# Patient Record
Sex: FEMALE | Race: WHITE | Employment: FULL TIME | URBAN - METROPOLITAN AREA
[De-identification: names, ages, dates, MRNs, and addresses within clinical notes are randomized per-mention and may not be internally consistent; named-entity substitution may affect disease eponyms.]

---

## 2017-07-18 ENCOUNTER — HOSPITAL ENCOUNTER (OUTPATIENT)
Dept: PHYSICAL THERAPY | Age: 47
Discharge: HOME | End: 2017-07-18
Payer: OTHER GOVERNMENT

## 2017-07-18 PROCEDURE — 97110 THERAPEUTIC EXERCISES: CPT

## 2017-07-18 PROCEDURE — 97140 MANUAL THERAPY 1/> REGIONS: CPT

## 2017-07-18 PROCEDURE — 97162 PT EVAL MOD COMPLEX 30 MIN: CPT

## 2017-07-18 PROCEDURE — 97112 NEUROMUSCULAR REEDUCATION: CPT

## 2017-07-18 NOTE — PROGRESS NOTES
[facility] in [address] [place], [facility] [address]  Phone: [phone] Fax: [phone] of Care/ Statement of Necessity for Physical Therapy Services    Patient name: Duy German Start of Care: 2017   Referral source: Jalen Ghotra,* : 1970    Medical Diagnosis: Pain in right hip [M25.551] Onset Date: early 2017   Treatment Diagnosis: pelvis obliquity/instability, LEs weakness, tight ITBs, muscular dysfunction   Prior Hospitalization: see medical history Provider#: [id number]   Medications: Verified on Patient summary List    Comorbidities: CA   Prior Level of Function: no deficits working at [employer] as       The [address] [place] [address] and following information is based on the information from the initial evaluation. Assessment/ key information:                Pt displayed right innominate upslip (corrected today resulting in abolishment of pain); strength of LEs grossly 5/5 all muscle groups except bilateral hip flexors (4+/5 right, 4/5 left), bilateral hip IR (4+/5 bilaterally), and right knee flexors (4/5); moderate-significant tenderness to palpation bilateral greater trochanters and bilateral lateral epicondyles of knees and bilateral ITBs; significant tightness of the bilateral ITBs; trigger points in the bilateral buttocks/piriformis muscles; negative scour test right hip. Evaluation Complexity History MEDIUM  Complexity : 1-2 comorbidities / personal factors will impact the outcome/ POC ; Examination HIGH Complexity : 4+ Standardized tests and measures addressing body structure, function, activity limitation and / or participation in recreation  ;Presentation MEDIUM Complexity : Evolving with changing characteristics  ; Clinical Decision Making MEDIUM Complexity : FOTO score of 26-74  Overall Complexity Rating: MEDIUM  Problem List: pain affecting function, decrease ROM, decrease strength, decrease ADL/ functional abilitiies, decrease activity tolerance and decrease flexibility/ joint mobility   Treatment Plan may include any combination of the following: Therapeutic exercise, Therapeutic activities, Neuromuscular re-education, Physical agent/modality, Manual therapy, Aquatic therapy and Patient education  Patient / Family readiness to learn indicated by: asking questions, trying to perform skills and interest  Persons(s) to be included in education: patient (P)  Barriers to Learning/Limitations: None  Patient Goal (s): \"help manage the pain for more mobility; return to walking [place] park. \"  Patient Self Reported Health Status: good  Rehabilitation Potential: good    Short Term Goals (To be accomplished in 3 weeks)   1) Pt will be independent and compliant with HEP to achieve other goals. Status at initial evaluation: Pt is not independent with exercises. 2) Innominates will remain aligned and stable in order to decrease pain and improve pt's ability to ambulate and climb stairs without increased pain. Status at initial evaluation: right innominate upslip (successfully corrected on 7/18/17)   3) Increase strength of bilateral LEs by 1/3 grade in order to allow pt to transfer sit to stand independently and normalize ADLs. Status at initial evaluation:  strength of LEs grossly 5/5 all muscle groups except bilateral hip flexors (4+/5 right, 4/5 left), bilateral hip IR (4+/5 bilaterally), and right knee flexors (4/5)    4) Decrease pain in right LE to 3/10 at worst during ADLs in order to allow pt to normalize ADLs. Status at initial evaluation: pain 5/10 at worst   5) Abolish trigger points in bilateral buttocks in order to decrease pain to 3/10 at worst during sitting, rising, standing, and walking.   Status at initial evaluation:  trigger points in the bilateral buttocks/piriformis muscles     Long Term Goals (To be accomplished in 6 weeks)   1) Pt will demonstrate ability to climb up and down 42 steps with reciprocal gait pattern with 1 hand rail in order to mobilize independently and safely. Status at initial evaluation: not assessed   2) Pt will be able to stand up to 8 hours at work in order to return to work duties. Status at initial evaluation: not assessed   3) Decrease pain in right LE to 1/10 at worst during ADLs in order to return to normal activities. Status at initial evaluation: pain 5/10 at wors   4) Increase strength of bilateral LEs to 5/5 without increased pain during resisted motions to allow pt ambulate, push, and pull without increased pain. Status at initial evaluation:  strength of LEs grossly 5/5 all muscle groups except bilateral hip flexors (4+/5 right, 4/5 left), bilateral hip IR (4+/5 bilaterally), and right knee flexors (4/5)   5) Decrease tenderness to palpation of bilateral greater trochanters to mild in order to allow pt to stand > 30 minutes and sleep undisturbed for at least 6 hours. Status at initial evaluation: moderate-significant tenderness to palpation bilateral greater trochanters    Frequency / Duration: Patient to be seen 2 times per week for 6 weeks. Patient/ Caregiver education and instruction: Diagnosis, prognosis, self care, activity modification and exercises, plan of care   [x]  Plan of care has been reviewed with ERWIN Barrera, PT 7/18/2017 10:03 AM    ________________________________________________________________________    I certify that the above Therapy Services are being furnished while the patient is under my care. I agree with the treatment plan and certify that this therapy is necessary. [de-identified] Signature:____________________  Date:____________Time: _________    Please sign and return to [facility] at [facility].  [place] [address]  Phone: [phone] Fax: [phone]

## 2017-07-18 NOTE — PROGRESS NOTES
PT DAILY TREATMENT NOTE     Patient Name: Kareem Raymond  Date:2017  : 1970  [x]  Patient  Verified  Payor: [facility] / Plan: [employer] [facility] [address] AND DEPENDENTS / Product Type: Fab Stammer /    In time:9:13  Out time:10:03  Total Treatment Time (min): 50  Visit #: 1 of 12    Treatment Area: Pain in right hip [M25.551]    SUBJECTIVE  Pain Level (0-10 scale): 2/10  Any medication changes, allergies to medications, adverse drug reactions, diagnosis change, or new procedure performed?: [x] No    [] Yes (see summary sheet for update)  Subjective functional status/changes:   [] No changes reported  right hip pain started 3 years ago   2 weeks ago sharp right pain, unloading truck [id number] boxes, 50-60 lbs. 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 \"    OBJECTIVE    Modality rationale:    Min Type Additional Details    [] Estim:  []Unatt       []IFC  []Premod                        []Other:  []w/ice   []w/heat  Position:  Location:    [] Estim: []Att    []TENS instruct  []NMES                    []Other:  []w/US   []w/ice   []w/heat  Position:  Location:    []  Traction: [] Cervical       []Lumbar                       [] Prone          []Supine                       []Intermittent   []Continuous Lbs:  [] before manual  [] after manual    []  Ultrasound: []Continuous   [] Pulsed                           []1MHz   []3MHz W/cm2:  Location:    []  Iontophoresis with dexamethasone         Location: [] Take home patch   [] In clinic    []  Ice     []  heat  []  Ice massage  []  Laser   []  Anodyne Position:  Location:    []  Laser with stim  []  Other: Position:  Location:    []  Vasopneumatic Device Pressure:       [] lo [] med [] hi   Temperature: [] lo [] med [] hi   [] Skin assessment post-treatment:  []intact []redness- no adverse reaction    []redness - adverse reaction:     17 min [x]Eval                  []Re-Eval       8 min Therapeutic Exercise:  [x] See flow sheet :  Added ITB stretches (side lying)   Rationale: increase ROM to improve the patients ability to tolerate standing and lying activities     min Therapeutic Activity:  []  See flow sheet :        10 min Neuromuscular Re-education:  [x]  See flow sheet :  Added glutes sets, prone heel presses, bridges with adduction   Rationale: increase strength, improve coordination and increase proprioception  to improve the patients ability to tolerate positions and ADLs. 15 min Manual Therapy:    METs to correct right innominate upslip (x3) (successful)   Rationale: decrease pain, increase tissue extensibility and correct joint alignment and joint mechanics to normalize ADLs and tolerate positions. min Gait Training:  ___ feet with ___ device on level surfaces with ___ level of assist   Rationale: With   [] TE   [] TA   [] neuro   [] other: Patient Education: [x] Review HEP    [] Progressed/Changed HEP based on:   [] positioning   [] body mechanics   [] transfers   [] heat/ice application    [] other:      Other Objective/Functional Measures:   See evaluation and plan of care.      Pain Level (0-10 scale) post treatment: 0/10    ASSESSMENT/Changes in Function:    Pt displayed right innominate upslip (corrected today resulting in abolishment of pain); strength of LEs grossly 5/5 all muscle groups except bilateral hip flexors (4+/5 right, 4/5 left), bilateral hip IR (4+/5 bilaterally), and right knee flexors (4/5); moderate-significant tenderness to palpation bilateral greater trochanters and bilateral lateral epicondyles of knees and bilateral ITBs; significant tightness of the bilateral ITBs; trigger points in the bilateral buttocks/piriformis muscles; negative scour test right hip. Patient will continue to benefit from skilled PT services to modify and progress therapeutic interventions, address functional mobility deficits, address ROM deficits, address strength deficits, analyze and address soft tissue restrictions, analyze and cue movement patterns, analyze and modify body mechanics/ergonomics, assess and modify postural abnormalities and instruct in home and community integration to attain remaining goals. []  See Plan of Care  []  See progress note/recertification  []  See Discharge Summary         Progress towards goals / Updated goals:  Short Term Goals (To be accomplished in 3 weeks)   1) Pt will be independent and compliant with HEP to achieve other goals. Status at initial evaluation: Pt is not independent with exercises. Current status: not reassessed     2) Innominates will remain aligned and stable in order to decrease pain and improve pt's ability to ambulate and climb stairs without increased pain. Status at initial evaluation: right innominate upslip (successfully corrected on 7/18/17)  Current status: not reassessed     3) Increase strength of bilateral LEs by 1/3 grade in order to allow pt to transfer sit to stand independently and normalize ADLs. Status at initial evaluation:  strength of LEs grossly 5/5 all muscle groups except bilateral hip flexors (4+/5 right, 4/5 left), bilateral hip IR (4+/5 bilaterally), and right knee flexors (4/5)   Current status: not reassessed     4) Decrease pain in right LE to 3/10 at worst during ADLs in order to allow pt to normalize ADLs. Status at initial evaluation: pain 5/10 at worst  Current status: not reassessed     5) Abolish trigger points in bilateral buttocks in order to decrease pain to 3/10 at worst during sitting, rising, standing, and walking.   Status at initial evaluation:  trigger points in the bilateral buttocks/piriformis muscles   Current status: not reassessed    Long Term Goals (To be accomplished in 6 weeks)   1) Pt will demonstrate ability to climb up and down 42 steps with reciprocal gait pattern with 1 hand rail in order to mobilize independently and safely. Status at initial evaluation: not assessed  Current status: not reassessed     2) Pt will be able to stand up to 8 hours at work in order to return to work duties. Status at initial evaluation: not assessed  Current status: not reassessed     3) Decrease pain in right LE to 1/10 at worst during ADLs in order to return to normal activities. Status at initial evaluation: pain 5/10 at worst  Current status: not reassessed     4) Increase strength of bilateral LEs to 5/5 without increased pain during resisted motions to allow pt ambulate, push, and pull without increased pain. Status at initial evaluation:  strength of LEs grossly 5/5 all muscle groups except bilateral hip flexors (4+/5 right, 4/5 left), bilateral hip IR (4+/5 bilaterally), and right knee flexors (4/5)  Current status: not reassessed     5) Decrease tenderness to palpation of bilateral greater trochanters to mild in order to allow pt to stand > 30 minutes and sleep undisturbed for at least 6 hours. Status at initial evaluation: moderate-significant tenderness to palpation bilateral greater trochanters  Current status: not reassessed    PLAN  []  Upgrade activities as tolerated     []  Continue plan of care  []  Update interventions per flow sheet       []  Discharge due to:_  [x]  Other: trigger point release, METs, innominate stabilization, stretches, US/ionotophoresis with dexamethasone, LE strengthening, positioning    Bard Malik DOUGLAS PT 7/18/2017  10:03 AM    No future appointments.

## 2017-07-18 NOTE — PROGRESS NOTES
Physical Therapy Evaluation- Hip  right hip pain started 3 years ago   2 weeks ago sharp right pain, unloading truck [id number] boxes, 50-60 lbs. boxes, tripped a couple of times over some boxes    Hx: CTS B (surgery on right June 2015), OA right knee and right shoulder  C/o: bilateral lateral hip pain (right>left), worse when laying on left, anterior right hip pain on palpation, sharp pain when turning with right LE planted  Job: [employer] manager  Goals: \"help manage the pain for more mobility; return to walking [place]. \"    Posture:    Gait:  [] Normal    [] Abnormal    [] Antalgic    [] NWB    Device:    Describe:         ROM/Strength        AROM                     PROM        Strength (1-5)  Hip Left Right Left Right Left Right   Flexion     4/5 4+/5   Extension     5/5 5/5   Abduction     5/5 5/5   Adduction     5/5 5/5   ER     5/5 5/5   IR     4+/5 4+/5   Knee Left Right Left Right Left Right   Extension     5/5 5/5   Flexion     5/5 4/5        Flexibility: [] Unable to assess at this time  Hamstrings:    (L) Tightness= [] WNL   [] Min   [] Mod   [] Severe    (R) Tightness= [] WNL   [] Min   [] Mod   [] Severe  Quadriceps:    (L) Tightness= [] WNL   [] Min   [] Mod   [] Severe    (R) Tightness= [] WNL   [] Min   [] Mod   [] Severe  Gastroc:    (L) Tightness= [] WNL   [] Min   [] Mod   [] Severe    (R) Tightness= [] WNL   [] Min   [] Mod   [] Severe                                  Palpation  [] Min  [] Mod  [] Severe    Location: 3+ TTP right greater trochanter, 2+ TTP left greater trochanter  [] Min  [] Mod  [] Severe    Location: trigger points B piriformis  [] Min  [] Mod  [] Severe    Location: 3+ TTP B lateral epicondyles of B knees and along the lengths of B ITBs    Optional Tests  Monty/ Dhruv Test: [] Neg    [] Pos (11 inches above table right, 10.25 inches above table left)  Wayne Test:  [] Neg    [] Pos  Scouring Test : [x] Neg    [] Pos  Trendelenberg:  [] Neg    [] Pos [] Left    [] Right  OberTest:   [] Neg    [x] Pos  (4 inches above table B LEs)  Ely's Test:  [] Neg    [] Pos  Piriformis Test:  [] Neg    [] Pos  Sub-talor alignment: [] Neurtral [] Pronation [] Supination  Forefoot alignment: [] Neutral [] Varus [] Valgus  Functional Leg Length (cm):   Right:  Left:  Discrepancy:    Other tests/ comments:  right LE shorter in supine and sitting- right innominate upslip

## 2017-07-20 ENCOUNTER — HOSPITAL ENCOUNTER (OUTPATIENT)
Dept: PHYSICAL THERAPY | Age: 47
Discharge: HOME | End: 2017-07-20
Payer: OTHER GOVERNMENT

## 2017-07-20 PROCEDURE — 97110 THERAPEUTIC EXERCISES: CPT

## 2017-07-20 NOTE — PROGRESS NOTES
PT DAILY TREATMENT NOTE     Patient Name: Gillian Felix  Date:2017  : 1970  [x]  Patient  Verified  Payor:  / Plan: [facility]  ACTIVE DUTY AND DEPENDENTS / Product Type: Gamaliel Lamdeon /    In time:8:33  Out time:9:30  Total Treatment Time (min): 62  Visit #: 2 of 12    Treatment Area: Pain in right hip [M25.551]    SUBJECTIVE  Pain Level (0-10 scale): 1/10  Any medication changes, allergies to medications, adverse drug reactions, diagnosis change, or new procedure performed?: [x] No    [] Yes (see summary sheet for update)  Subjective functional status/changes:   [] No changes reported  \"I'm feeling really good. Yesterday was a different matter because I was doing inventory. I felt really good for the rest of the day after last treatment. \"    OBJECTIVE    Modality rationale:    Min Type Additional Details    [] Estim:  []Unatt       []IFC  []Premod                        []Other:  []w/ice   []w/heat  Position:  Location:    [] Estim: []Att    []TENS instruct  []NMES                    []Other:  []w/US   []w/ice   []w/heat  Position:  Location:    []  Traction: [] Cervical       []Lumbar                       [] Prone          []Supine                       []Intermittent   []Continuous Lbs:  [] before manual  [] after manual    []  Ultrasound: []Continuous   [] Pulsed                           []1MHz   []3MHz W/cm2:  Location:    []  Iontophoresis with dexamethasone         Location: [] Take home patch   [] In clinic    []  Ice     []  heat  []  Ice massage  []  Laser   []  Anodyne Position:  Location:    []  Laser with stim  []  Other:  Position:  Location:    []  Vasopneumatic Device Pressure:       [] lo [] med [] hi   Temperature: [] lo [] med [] hi   [] Skin assessment post-treatment:  []intact []redness- no adverse reaction    []redness - adverse reaction:      min []Eval                  []Re-Eval        min Therapeutic Exercise:  [] See flow sheet :        min Therapeutic Activity:  []  See flow sheet :        57 min Neuromuscular Re-education:  []  See flow sheet :  Added transverse abdominus (TA) bracing with Swissball #1, #2, #3, added 90-90 hip lift (JOANIE),    Rationale: increase strength, improve coordination and increase proprioception  to improve the patients ability to tolerate positions and ADLs. min Manual Therapy:          min Gait Training:  ___ feet with ___ device on level surfaces with ___ level of assist   Rationale: With   [] TE   [] TA   [] neuro   [] other: Patient Education: [x] Review HEP    [] Progressed/Changed HEP based on:   [] positioning   [] body mechanics   [] transfers   [] heat/ice application    [] other:      Other Objective/Functional Measures:   Innominates aligned     Pain Level (0-10 scale) post treatment: 1/10    ASSESSMENT/Changes in Function:    Innominates remaining aligned and stable. Pt able to perform exercises with few verbal and tactile cues. Pain reduced since innominate corrected on 7/18/17. Patient will continue to benefit from skilled PT services to modify and progress therapeutic interventions, address functional mobility deficits, address ROM deficits, address strength deficits, analyze and address soft tissue restrictions, analyze and cue movement patterns, analyze and modify body mechanics/ergonomics, assess and modify postural abnormalities and instruct in home and community integration to attain remaining goals.      []  See Plan of Care  []  See progress note/recertification  []  See Discharge Summary      Progress towards goals / Updated goals:  Short Term Goals (To be accomplished in 3 weeks)                        1) Pt will be independent and compliant with HEP to achieve other goals. Status at initial evaluation: Pt is not independent with exercises.   Current status: not reassessed                           2) Innominates will remain aligned and stable in order to decrease pain and improve pt's ability to ambulate and climb stairs without increased pain. Status at initial evaluation: right innominate upslip (successfully corrected on 7/18/17)  Current status: met (right innominate upslip last corrected on 7/18/17) 7/20/17                           3) Increase strength of bilateral LEs by 1/3 grade in order to allow pt to transfer sit to stand independently and normalize ADLs. Status at initial evaluation:  strength of LEs grossly 5/5 all muscle groups except bilateral hip flexors (4+/5 right, 4/5 left), bilateral hip IR (4+/5 bilaterally), and right knee flexors (4/5)   Current status: not reassessed                           4) Decrease pain in right LE to 3/10 at worst during ADLs in order to allow pt to normalize ADLs. Status at initial evaluation: pain 5/10 at worst  Current status: not reassessed                           5) Abolish trigger points in bilateral buttocks in order to decrease pain to 3/10 at worst during sitting, rising, standing, and walking. Status at initial evaluation:  trigger points in the bilateral buttocks/piriformis muscles   Current status: not reassessed     Long Term Goals (To be accomplished in 6 weeks)                        1) Pt will demonstrate ability to climb up and down 42 steps with reciprocal gait pattern with 1 hand rail in order to mobilize independently and safely. Status at initial evaluation: not assessed  Current status: not reassessed                           2) Pt will be able to stand up to 8 hours at work in order to return to work duties. Status at initial evaluation: not assessed  Current status: not reassessed                           3) Decrease pain in right LE to 1/10 at worst during ADLs in order to return to normal activities.   Status at initial evaluation: pain 5/10 at worst  Current status: not reassessed                           4) Increase strength of bilateral LEs to 5/5 without increased pain during resisted motions to allow pt ambulate, push, and pull without increased pain. Status at initial evaluation:  strength of LEs grossly 5/5 all muscle groups except bilateral hip flexors (4+/5 right, 4/5 left), bilateral hip IR (4+/5 bilaterally), and right knee flexors (4/5)  Current status: not reassessed                           5) Decrease tenderness to palpation of bilateral greater trochanters to mild in order to allow pt to stand > 30 minutes and sleep undisturbed for at least 6 hours.   Status at initial evaluation: moderate-significant tenderness to palpation bilateral greater trochanters  Current status: not reassessed    PLAN  [x]  Upgrade activities as tolerated     [x]  Continue plan of care  []  Update interventions per flow sheet       []  Discharge due to:_  []  Other:_      Emily Perez PT 7/20/2017  8:37 AM    Future Appointments  Date Time Provider Chris Liz   7/26/2017 3:30 PM TALITA Jackson [facility]   7/27/2017 3:30 PM TALITA Hsu [facility]

## 2017-07-26 ENCOUNTER — HOSPITAL ENCOUNTER (OUTPATIENT)
Dept: PHYSICAL THERAPY | Age: 47
Discharge: HOME | End: 2017-07-26
Payer: OTHER GOVERNMENT

## 2017-07-26 PROCEDURE — 97112 NEUROMUSCULAR REEDUCATION: CPT

## 2017-07-26 PROCEDURE — 97110 THERAPEUTIC EXERCISES: CPT

## 2017-07-26 PROCEDURE — 97140 MANUAL THERAPY 1/> REGIONS: CPT

## 2017-07-26 NOTE — PROGRESS NOTES
PT DAILY TREATMENT NOTE     Patient Name: Renan Ramos  Date:2017  : 1970  [x]  Patient  Verified  Payor:  / Plan: [facility]  ACTIVE DUTY AND DEPENDENTS / Product Type:  /    In time:3:35  Out time:4:45  Total Treatment Time (min): 70  Visit #: 3 of 12    Treatment Area: Pain in right hip [M25.551]    SUBJECTIVE  Pain Level (0-10 scale): 0/10  Any medication changes, allergies to medications, adverse drug reactions, diagnosis change, or new procedure performed?: [x] No    [] Yes (see summary sheet for update)  Subjective functional status/changes:   [] No changes reported  \"Pain 4/10 worst since last treatment. I unloaded a truck on Friday and felt like I came out of alignment. I'm 35-40% better. \"    OBJECTIVE    Modality rationale:    Min Type Additional Details    [] Estim:  []Unatt       []IFC  []Premod                        []Other:  []w/ice   []w/heat  Position:  Location:    [] Estim: []Att    []TENS instruct  []NMES                    []Other:  []w/US   []w/ice   []w/heat  Position:  Location:    []  Traction: [] Cervical       []Lumbar                       [] Prone          []Supine                       []Intermittent   []Continuous Lbs:  [] before manual  [] after manual    []  Ultrasound: []Continuous   [] Pulsed                           []1MHz   []3MHz W/cm2:  Location:    []  Iontophoresis with dexamethasone         Location: [] Take home patch   [] In clinic    []  Ice     []  heat  []  Ice massage  []  Laser   []  Anodyne Position:  Location:    []  Laser with stim  []  Other:  Position:  Location:    []  Vasopneumatic Device Pressure:       [] lo [] med [] hi   Temperature: [] lo [] med [] hi   [] Skin assessment post-treatment:  []intact []redness- no adverse reaction    []redness - adverse reaction:      min []Eval                  []Re-Eval       27 min Therapeutic Exercise:  [x] See flow sheet :  Added piriformis stretches, added 4 way hip SLR using green theraband   Rationale: increase ROM to improve the patients ability to tolerate sitting activities. min Therapeutic Activity:  []  See flow sheet :        33 min Neuromuscular Re-education:  []  See flow sheet :   Rationale: increase strength, improve coordination and increase proprioception  to improve the patients ability to tolerate positions and ADLs. 10 min Manual Therapy:    METs to correct right innominate upslip (successful)   Rationale: decrease pain, increase tissue extensibility and correct joint alignment and joint mechanics to normalize ADLs and tolerate positions. min Gait Training:  ___ feet with ___ device on level surfaces with ___ level of assist   Rationale: With   [] TE   [] TA   [] neuro   [] other: Patient Education: [x] Review HEP    [] Progressed/Changed HEP based on:   [] positioning   [] body mechanics   [] transfers   [] heat/ice application    [] other:      Other Objective/Functional Measures:   right LE shorter in supine and sitting- right innominate upslip     Pain Level (0-10 scale) post treatment: 3/10    ASSESSMENT/Changes in Function:    Pain in right hip aggravated by the 4 way hip SLR. Right innominate upslip corrected today using only 1 attempt. Pt reports at least 35% improvement since [facility]. .    Patient will continue to benefit from skilled PT services to modify and progress therapeutic interventions, address functional mobility deficits, address ROM deficits, address strength deficits, analyze and address soft tissue restrictions, analyze and cue movement patterns, analyze and modify body mechanics/ergonomics, assess and modify postural abnormalities and instruct in home and community integration to attain remaining goals.      []  See Plan of Care  []  See progress note/recertification  []  See Discharge Summary         Progress towards goals / Updated goals:  Short Term Goals (To be accomplished in 3 weeks)                        1) Pt will be independent and compliant with HEP to achieve other goals. Status at initial evaluation: Pt is not independent with exercises. Current status: progressing (pt is 50% independent with exercises.) 7/26/17                            2) Innominates will remain aligned and stable in order to decrease pain and improve pt's ability to ambulate and climb stairs without increased pain. Status at initial evaluation: right innominate upslip (successfully corrected on 7/18/17)  Current status: not met (right innominate upslip last corrected on 7/26/17) 7/26/17                            3) Increase strength of bilateral LEs by 1/3 grade in order to allow pt to transfer sit to stand independently and normalize ADLs. Status at initial evaluation:  strength of LEs grossly 5/5 all muscle groups except bilateral hip flexors (4+/5 right, 4/5 left), bilateral hip IR (4+/5 bilaterally), and right knee flexors (4/5)   Current status: not reassessed                            4) Decrease pain in right LE to 3/10 at worst during ADLs in order to allow pt to normalize ADLs. Status at initial evaluation: pain 5/10 at worst  Current status: not reassessed                            5) Abolish trigger points in bilateral buttocks in order to decrease pain to 3/10 at worst during sitting, rising, standing, and walking. Status at initial evaluation:  trigger points in the bilateral buttocks/piriformis muscles   Current status: not reassessed      Long Term Goals (To be accomplished in 6 weeks)                        1) Pt will demonstrate ability to climb up and down 42 steps with reciprocal gait pattern with 1 hand rail in order to mobilize independently and safely. Status at initial evaluation: not assessed  Current status: not reassessed                            2) Pt will be able to stand up to 8 hours at work in order to return to work duties.   Status at initial evaluation: not assessed  Current status: not reassessed                            3) Decrease pain in right LE to 1/10 at worst during ADLs in order to return to normal activities. Status at initial evaluation: pain 5/10 at worst  Current status: not reassessed                            4) Increase strength of bilateral LEs to 5/5 without increased pain during resisted motions to allow pt ambulate, push, and pull without increased pain. Status at initial evaluation:  strength of LEs grossly 5/5 all muscle groups except bilateral hip flexors (4+/5 right, 4/5 left), bilateral hip IR (4+/5 bilaterally), and right knee flexors (4/5)  Current status: not reassessed                            5) Decrease tenderness to palpation of bilateral greater trochanters to mild in order to allow pt to stand > 30 minutes and sleep undisturbed for at least 6 hours.   Status at initial evaluation: moderate-significant tenderness to palpation bilateral greater trochanters  Current status: not reassessed    PLAN  []  Upgrade activities as tolerated     []  Continue plan of care  []  Update interventions per flow sheet       []  Discharge due to:_  []  Other:_      Jeff Reynolds PT 7/26/2017  3:38 PM    Future Appointments  Date Time Provider Chris Liz   7/27/2017 3:30 PM Dayami DIAS [facility]   8/1/2017 8:30 AM THE [facility] DANIELD [facility]   8/2/2017 9:30 AM TALITA Brumfield THE [facility]   8/8/2017 9:00 AM TALITA Johnson THE [facility]   8/9/2017 9:00 AM TALITA Brumfield THE [facility]

## 2017-07-27 ENCOUNTER — HOSPITAL ENCOUNTER (OUTPATIENT)
Dept: PHYSICAL THERAPY | Age: 47
Discharge: HOME | End: 2017-07-27
Payer: OTHER GOVERNMENT

## 2017-07-27 PROCEDURE — 97112 NEUROMUSCULAR REEDUCATION: CPT

## 2017-07-27 NOTE — PROGRESS NOTES
PT DAILY TREATMENT NOTE     Patient Name: Serenity Azevedo  Date:2017  : 1970  [x]  Patient  Verified  Payor:  / Plan: [facility]  ACTIVE DUTY AND DEPENDENTS / Product Type: [employer] /    In time:3:30  Out time:4:18  Total Treatment Time (min): 48  Visit #: 4 of 12    Treatment Area: Pain in right hip [M25.551]    SUBJECTIVE  Pain Level (0-10 scale): 5/10 increased pain right hip since adjustment yesterday  Any medication changes, allergies to medications, adverse drug reactions, diagnosis change, or new procedure performed?: [x] No    [] Yes (see summary sheet for update)  Subjective functional status/changes:   [] No changes reported  \"I have been working really long hours and I can feel it. I am aching all over and my pain is worse. Increased pain also since adjustment yesterday. \"    OBJECTIVE    Modality rationale:    Min Type Additional Details    [] Estim:  []Unatt       []IFC  []Premod                        []Other:  []w/ice   []w/heat  Position:  Location:    [] Estim: []Att    []TENS instruct  []NMES                    []Other:  []w/US   []w/ice   []w/heat  Position:  Location:    []  Traction: [] Cervical       []Lumbar                       [] Prone          []Supine                       []Intermittent   []Continuous Lbs:  [] before manual  [] after manual    []  Ultrasound: []Continuous   [] Pulsed                           []1MHz   []3MHz W/cm2:  Location:    []  Iontophoresis with dexamethasone         Location: [] Take home patch   [] In clinic   10 []  Ice     [x]  heat  []  Ice massage  []  Laser   []  Anodyne Position:90/90  Location:LB/pelvis    []  Laser with stim  []  Other:  Position:  Location:    []  Vasopneumatic Device Pressure:       [] lo [] med [] hi   Temperature: [] lo [] med [] hi   [] Skin assessment post-treatment:  []intact []redness- no adverse reaction    []redness - adverse reaction:      min []Eval                  []Re-Eval        min Therapeutic Exercise:  [x] See flow sheet :          min Therapeutic Activity:  []  See flow sheet :        38 min Neuromuscular Re-education:  [x]  See flow sheet :JOANIE repositioning for improved trunk neutrality/alignment, review of updated HEP/JOANIE, proper breathing pattern and JOANIE ADL's for self correction   Rationale: increase strength, improve coordination and increase proprioception  to improve the patients ability to tolerate positions and ADLs. min Manual Therapy:            min Gait Training:  ___ feet with ___ device on level surfaces with ___ level of assist   Rationale: With   [] TE   [] TA   [] neuro   [] other: Patient Education: [x] Review HEP    [] Progressed/Changed HEP based on:   [] positioning   [] body mechanics   [] transfers   [] heat/ice application    [] other:      Other Objective/Functional Measures:   Asymmetries in hip Rot mobility   Shallow breathing pattern, deficit in core strength and trunk neutrality  Positive ADT bilaterally  Increased symptoms with all hip flex activities- holding during today's visit     Pain Level (0-10 scale) post treatment: 2.5/10    ASSESSMENT/Changes in Function:   Reduction in pain post PT, good response to JOANIE  . Patient will continue to benefit from skilled PT services to modify and progress therapeutic interventions, address functional mobility deficits, address ROM deficits, address strength deficits, analyze and address soft tissue restrictions, analyze and cue movement patterns, analyze and modify body mechanics/ergonomics, assess and modify postural abnormalities and instruct in home and community integration to attain remaining goals. [x]  See Plan of Care  []  See progress note/recertification  []  See Discharge Summary         Progress towards goals / Updated goals:  Short Term Goals (To be accomplished in 3 weeks)                        1) Pt will be independent and compliant with HEP to achieve other goals.   Status at initial evaluation: Pt is not independent with exercises. Current status: progressing (pt is 50% independent with exercises.) 7/26/17                            0) Innominates will remain aligned and stable in order to decrease pain and improve pt's ability to ambulate and climb stairs without increased pain. Status at initial evaluation: right innominate upslip (successfully corrected on 7/18/17)  Current status: not met (right innominate upslip last corrected on 7/26/17) 7/26/17                            3) Increase strength of bilateral LEs by 1/3 grade in order to allow pt to transfer sit to stand independently and normalize ADLs. Status at initial evaluation:  strength of LEs grossly 5/5 all muscle groups except bilateral hip flexors (4+/5 right, 4/5 left), bilateral hip IR (4+/5 bilaterally), and right knee flexors (4/5)   Current status: not reassessed                            4) Decrease pain in right LE to 3/10 at worst during ADLs in order to allow pt to normalize ADLs. Status at initial evaluation: pain 5/10 at worst  Current status: not reassessed                            5) Abolish trigger points in bilateral buttocks in order to decrease pain to 3/10 at worst during sitting, rising, standing, and walking. Status at initial evaluation:  trigger points in the bilateral buttocks/piriformis muscles   Current status: not reassessed      Long Term Goals (To be accomplished in 6 weeks)                        1) Pt will demonstrate ability to climb up and down 42 steps with reciprocal gait pattern with 1 hand rail in order to mobilize independently and safely. Status at initial evaluation: not assessed  Current status: not reassessed                            2) Pt will be able to stand up to 8 hours at work in order to return to work duties.   Status at initial evaluation: not assessed  Current status: not reassessed                            3) Decrease pain in right LE to 1/10 at worst during ADLs in order to return to normal activities. Status at initial evaluation: pain 5/10 at worst  Current status: not reassessed                            4) Increase strength of bilateral LEs to 5/5 without increased pain during resisted motions to allow pt ambulate, push, and pull without increased pain. Status at initial evaluation:  strength of LEs grossly 5/5 all muscle groups except bilateral hip flexors (4+/5 right, 4/5 left), bilateral hip IR (4+/5 bilaterally), and right knee flexors (4/5)  Current status: not reassessed                            5) Decrease tenderness to palpation of bilateral greater trochanters to mild in order to allow pt to stand > 30 minutes and sleep undisturbed for at least 6 hours.   Status at initial evaluation: moderate-significant tenderness to palpation bilateral greater trochanters  Current status: not reassessed    PLAN  []  Upgrade activities as tolerated     []  Continue plan of care  []  Update interventions per flow sheet       []  Discharge due to:_  []  Other:_      Tomasz Colin PT 7/27/2017  3:38 PM    Future Appointments  Date Time Provider Chris Liz   7/27/2017 3:30 PM Kori DIAS THE [facility]   8/2/2017 9:30 AM Aloma Kanner, PT [facility]   8/8/2017 9:00 AM TALITA Gill [facility]   8/9/2017 9:00 AM Aloma Kanner, PT [facility]

## 2017-08-01 ENCOUNTER — APPOINTMENT (OUTPATIENT)
Dept: PHYSICAL THERAPY | Age: 47
End: 2017-08-01
Payer: OTHER GOVERNMENT

## 2017-08-02 ENCOUNTER — HOSPITAL ENCOUNTER (OUTPATIENT)
Dept: PHYSICAL THERAPY | Age: 47
Discharge: HOME | End: 2017-08-02
Payer: OTHER GOVERNMENT

## 2017-08-02 PROCEDURE — 97112 NEUROMUSCULAR REEDUCATION: CPT

## 2017-08-02 PROCEDURE — 97110 THERAPEUTIC EXERCISES: CPT

## 2017-08-02 PROCEDURE — 97035 APP MDLTY 1+ULTRASOUND EA 15: CPT

## 2017-08-02 PROCEDURE — 97140 MANUAL THERAPY 1/> REGIONS: CPT

## 2017-08-02 NOTE — PROGRESS NOTES
PT DAILY TREATMENT NOTE     Patient Name: Bobby Garsia  Date:2017  : 1970  [x]  Patient  Verified  Payor:  / Plan: [facility]  ACTIVE DUTY AND DEPENDENTS / Product Type:  /    In time:9:34  Out time:10:40  Total Treatment Time (min): 47  Visit #: 5 of 12    Treatment Area: Pain in right hip [M25.551]    SUBJECTIVE  Pain Level (0-10 scale): 2/10  Any medication changes, allergies to medications, adverse drug reactions, diagnosis change, or new procedure performed?: [x] No    [] Yes (see summary sheet for update)  Subjective functional status/changes:   [] No changes reported  \"I am surprised how little pain I had when I unloaded the truck of Friday (3400 pieces). \"    OBJECTIVE    Modality rationale: decrease edema, decrease inflammation and decrease pain to improve the patients ability to tolerate positions and ADLs.    Min Type Additional Details    [] Estim:  []Unatt       []IFC  []Premod                        []Other:  []w/ice   []w/heat  Position:  Location:    [] Estim: []Att    []TENS instruct  []NMES                    []Other:  []w/US   []w/ice   []w/heat  Position:  Location:    []  Traction: [] Cervical       []Lumbar                       [] Prone          []Supine                       []Intermittent   []Continuous Lbs:  [] before manual  [] after manual   8 [x]  Ultrasound: []Continuous   [x] Pulsed (50%)                           [x]1MHz   []3MHz W/cm2: 1.2  Location: right greater trochanter    []  Iontophoresis with dexamethasone         Location: [] Take home patch   [] In clinic    []  Ice     []  heat  []  Ice massage  []  Laser   []  Anodyne Position:  Location:    []  Laser with stim  []  Other:  Position:  Location:    []  Vasopneumatic Device Pressure:       [] lo [] med [] hi   Temperature: [] lo [] med [] hi   [] Skin assessment post-treatment:  []intact []redness- no adverse reaction    []redness - adverse reaction:      min []Eval []Re-Eval       23 min Therapeutic Exercise:  [] See flow sheet :   Rationale: increase ROM to improve the patients ability to tolerate sitting activities. min Therapeutic Activity:  []  See flow sheet :        13 min Neuromuscular Re-education:  []  See flow sheet :   Rationale: increase strength, improve coordination and increase proprioception  to improve the patients ability to tolerate positions and ADLs. 10 min Manual Therapy:    METs to correct right innominate upslip (successful)   Rationale: decrease pain, increase tissue extensibility and correct joint alignment and joint mechanics to normalize ADLs and tolerate positions. min Gait Training:  ___ feet with ___ device on level surfaces with ___ level of assist   Rationale: With   [] TE   [] TA   [] neuro   [] other: Patient Education: [x] Review HEP    [] Progressed/Changed HEP based on:   [] positioning   [] body mechanics   [] transfers   [] heat/ice application    [] other:      Other Objective/Functional Measures:   right LE shorter in supine and sitting- right innominate upslip  FOTO: 44/100     Pain Level (0-10 scale) post treatment: 3/10    ASSESSMENT/Changes in Function:    Right innominate continues to be unstable requiring METs to correct innominate alignment. Pt reports decreased limp when innominates properly aligned. Pt is reporting a significant decrease in pain during ADLs at work and home since [facility].      Patient will continue to benefit from skilled PT services to modify and progress therapeutic interventions, address functional mobility deficits, address ROM deficits, address strength deficits, analyze and address soft tissue restrictions, analyze and cue movement patterns, analyze and modify body mechanics/ergonomics, assess and modify postural abnormalities and instruct in home and community integration to attain remaining goals.      [x]  See Plan of Care  []  See progress note/recertification  []  See Discharge Summary      Progress towards goals / Updated goals:  Short Term Goals (To be accomplished in 3 weeks)                        7) Pt will be independent and compliant with HEP to achieve other goals. Status at initial evaluation: Pt is not independent with exercises. Current status: progressing (pt is 50% independent with exercises.) 7/26/17                            3) Innominates will remain aligned and stable in order to decrease pain and improve pt's ability to ambulate and climb stairs without increased pain. Status at initial evaluation: right innominate upslip (successfully corrected on 7/18/17)  Current status: not met (right innominate upslip last corrected on 8/02/17) 8/02/17                            3) Increase strength of bilateral LEs by 1/3 grade in order to allow pt to transfer sit to stand independently and normalize ADLs. Status at initial evaluation:  strength of LEs grossly 5/5 all muscle groups except bilateral hip flexors (4+/5 right, 4/5 left), bilateral hip IR (4+/5 bilaterally), and right knee flexors (4/5)   Current status: not reassessed                            4) Decrease pain in right LE to 3/10 at worst during ADLs in order to allow pt to normalize ADLs. Status at initial evaluation: pain 5/10 at worst  Current status: progressing (pain 4/10 at worst) 8/02/17                            5) Abolish trigger points in bilateral buttocks in order to decrease pain to 3/10 at worst during sitting, rising, standing, and walking. Status at initial evaluation:  trigger points in the bilateral buttocks/piriformis muscles   Current status: not reassessed      Long Term Goals (To be accomplished in 6 weeks)                        1) Pt will demonstrate ability to climb up and down 42 steps with reciprocal gait pattern with 1 hand rail in order to mobilize independently and safely.   Status at initial evaluation: not assessed  Current status: not reassessed                            2) Pt will be able to stand up to 8 hours at work in order to return to work duties. Status at initial evaluation: not assessed  Current status: not reassessed                            3) Decrease pain in right LE to 1/10 at worst during ADLs in order to return to normal activities. Status at initial evaluation: pain 5/10 at worst  Current status:  progressing (pain 4/10 at worst) 8/02/17                            4) Increase strength of bilateral LEs to 5/5 without increased pain during resisted motions to allow pt ambulate, push, and pull without increased pain. Status at initial evaluation:  strength of LEs grossly 5/5 all muscle groups except bilateral hip flexors (4+/5 right, 4/5 left), bilateral hip IR (4+/5 bilaterally), and right knee flexors (4/5)  Current status: not reassessed                            5) Decrease tenderness to palpation of bilateral greater trochanters to mild in order to allow pt to stand > 30 minutes and sleep undisturbed for at least 6 hours.   Status at initial evaluation: moderate-significant tenderness to palpation bilateral greater trochanters  Current status: not reassessed    PLAN  []  Upgrade activities as tolerated     [x]  Continue plan of care  []  Update interventions per flow sheet       []  Discharge due to:_  []  Other:_      Jaill Roblero, PT 8/2/2017  9:38 AM    Future Appointments  Date Time Provider Chris Liz   8/8/2017 9:00 AM Moriah Joseph V PT LARISSA [facility]   8/9/2017 9:00 AM TALITA Prado THE [facility]

## 2017-08-08 ENCOUNTER — HOSPITAL ENCOUNTER (OUTPATIENT)
Dept: PHYSICAL THERAPY | Age: 47
Discharge: HOME | End: 2017-08-08
Payer: OTHER GOVERNMENT

## 2017-08-08 PROCEDURE — 97140 MANUAL THERAPY 1/> REGIONS: CPT

## 2017-08-08 PROCEDURE — 97035 APP MDLTY 1+ULTRASOUND EA 15: CPT

## 2017-08-08 PROCEDURE — 97110 THERAPEUTIC EXERCISES: CPT

## 2017-08-08 PROCEDURE — 97112 NEUROMUSCULAR REEDUCATION: CPT

## 2017-08-08 NOTE — PROGRESS NOTES
PT DAILY TREATMENT NOTE     Patient Name: Komal Salcido  Date:2017  : 1970  [x]  Patient  Verified  Payor:  / Plan: [facility]  ACTIVE DUTY AND DEPENDENTS / Product Type:  /    In time:9:07  Out time:10:20  Total Treatment Time (min): 68  Visit #: 6 of 12    Treatment Area: Pain in right hip [M25.551]    SUBJECTIVE  Pain Level (0-10 scale): 0/10  Any medication changes, allergies to medications, adverse drug reactions, diagnosis change, or new procedure performed?: [x] No    [] Yes (see summary sheet for update)  Subjective functional status/changes:   [] No changes reported  \"Pain 4/10 at worst when I was unloading a truck. \"    OBJECTIVE    Modality rationale: decrease edema, decrease inflammation and decrease pain to improve the patients ability to tolerate positions and ADLs.    Min Type Additional Details    [] Estim:  []Unatt       []IFC  []Premod                        []Other:  []w/ice   []w/heat  Position:  Location:    [] Estim: []Att    []TENS instruct  []NMES                    []Other:  []w/US   []w/ice   []w/heat  Position:  Location:    []  Traction: [] Cervical       []Lumbar                       [] Prone          []Supine                       []Intermittent   []Continuous Lbs:  [] before manual  [] after manual   8 [x]  Ultrasound: []Continuous   [x] Pulsed (50%)                           [x]1MHz   []3MHz W/cm2: 1.2  Location: right greater trochanter    []  Iontophoresis with dexamethasone         Location: [] Take home patch   [] In clinic    []  Ice     []  heat  []  Ice massage  []  Laser   []  Anodyne Position:  Location:    []  Laser with stim  []  Other:  Position:  Location:    []  Vasopneumatic Device Pressure:       [] lo [] med [] hi   Temperature: [] lo [] med [] hi   [] Skin assessment post-treatment:  []intact []redness- no adverse reaction    []redness - adverse reaction:      min []Eval                  []Re-Eval       30 min Therapeutic Exercise:  [] See flow sheet :   Rationale: increase ROM to improve the patients ability to tolerate sitting activities. min Therapeutic Activity:  []  See flow sheet :        25 min Neuromuscular Re-education:  []  See flow sheet :   Rationale: increase strength, improve coordination and increase proprioception  to improve the patients ability to tolerate positions and ADLs. 10 min Manual Therapy:    METs to correct posteriorly rotated right innonminate   Rationale: decrease pain, increase tissue extensibility and correct joint alignment and joint mechanics to normalize ADLs and tolerate positions. min Gait Training:  ___ feet with ___ device on level surfaces with ___ level of assist   Rationale: With   [] TE   [] TA   [] neuro   [] other: Patient Education: [x] Review HEP    [] Progressed/Changed HEP based on:   [] positioning   [] body mechanics   [] transfers   [] heat/ice application    [] other:      Other Objective/Functional Measures:  Pt entered clinic displaying a mild Trendelenberg gait abnormality. left LE shortens by 1-2 mm in sitting- posteriorly rotated right innominate  right standing flexion test  2-3+TTP right PSIS  1-2+TTP left PSIS     Pain Level (0-10 scale) post treatment: 1-2/10    ASSESSMENT/Changes in Function:    Posteriorly rotated right innominate corrected today using METs allowing pt to ambulate without gait deviation. Pt reports a consistent decrease of pain over the last week. Right innominate displays instability.     Patient will continue to benefit from skilled PT services to modify and progress therapeutic interventions, address functional mobility deficits, address ROM deficits, address strength deficits, analyze and address soft tissue restrictions, analyze and cue movement patterns, analyze and modify body mechanics/ergonomics, assess and modify postural abnormalities and instruct in home and community integration to attain remaining goals.      [x]  See Plan of Care  []  See progress note/recertification  []  See Discharge Summary      Progress towards goals / Updated goals:  Short Term Goals (To be accomplished in 3 weeks)                        1) Pt will be independent and compliant with HEP to achieve other goals. Status at initial evaluation: Pt is not independent with exercises. Current status: progressing (pt is 50% independent with exercises.) 7/26/17                            4) Innominates will remain aligned and stable in order to decrease pain and improve pt's ability to ambulate and climb stairs without increased pain. Status at initial evaluation: right innominate upslip (successfully corrected on 7/18/17)  Current status: not met (posteriorly rotated right innominate last corrected on 8/08/17) 8/08/17                            3) Increase strength of bilateral LEs by 1/3 grade in order to allow pt to transfer sit to stand independently and normalize ADLs. Status at initial evaluation:  strength of LEs grossly 5/5 all muscle groups except bilateral hip flexors (4+/5 right, 4/5 left), bilateral hip IR (4+/5 bilaterally), and right knee flexors (4/5)   Current status: not reassessed                            4) Decrease pain in right LE to 3/10 at worst during ADLs in order to allow pt to normalize ADLs. Status at initial evaluation: pain 5/10 at worst  Current status: progressing (pain 4/10 at worst) 8/02/17                            5) Abolish trigger points in bilateral buttocks in order to decrease pain to 3/10 at worst during sitting, rising, standing, and walking.   Status at initial evaluation:  trigger points in the bilateral buttocks/piriformis muscles   Current status: not reassessed      Long Term Goals (To be accomplished in 6 weeks)                        1) Pt will demonstrate ability to climb up and down 42 steps with reciprocal gait pattern with 1 hand rail in order to mobilize independently and safely. Status at initial evaluation: not assessed  Current status: not reassessed                            2) Pt will be able to stand up to 8 hours at work in order to return to work duties. Status at initial evaluation: not assessed  Current status: not reassessed                            3) Decrease pain in right LE to 1/10 at worst during ADLs in order to return to normal activities. Status at initial evaluation: pain 5/10 at worst  Current status:  progressing (pain 4/10 at worst) 8/02/17                            4) Increase strength of bilateral LEs to 5/5 without increased pain during resisted motions to allow pt ambulate, push, and pull without increased pain. Status at initial evaluation:  strength of LEs grossly 5/5 all muscle groups except bilateral hip flexors (4+/5 right, 4/5 left), bilateral hip IR (4+/5 bilaterally), and right knee flexors (4/5)  Current status: not reassessed                            5) Decrease tenderness to palpation of bilateral greater trochanters to mild in order to allow pt to stand > 30 minutes and sleep undisturbed for at least 6 hours.   Status at initial evaluation: moderate-significant tenderness to palpation bilateral greater trochanters  Current status: not reassessed    PLAN  []  Upgrade activities as tolerated     [x]  Continue plan of care  []  Update interventions per flow sheet       []  Discharge due to:_  []  Other:_      Velma Rueda PT 8/8/2017  9:10 AM    Future Appointments  Date Time Provider Chris Liz   8/9/2017 9:00 AM TALITA Chaves [facility]   8/15/2017 9:30 AM TALITA Chaves [facility]   8/17/2017 10:30 AM TALITA AustinHPHOPE [facility]

## 2017-08-09 ENCOUNTER — HOSPITAL ENCOUNTER (OUTPATIENT)
Dept: PHYSICAL THERAPY | Age: 47
Discharge: HOME | End: 2017-08-09
Payer: OTHER GOVERNMENT

## 2017-08-09 PROCEDURE — 97110 THERAPEUTIC EXERCISES: CPT

## 2017-08-09 PROCEDURE — 97112 NEUROMUSCULAR REEDUCATION: CPT

## 2017-08-09 PROCEDURE — 97140 MANUAL THERAPY 1/> REGIONS: CPT

## 2017-08-09 PROCEDURE — 97035 APP MDLTY 1+ULTRASOUND EA 15: CPT

## 2017-08-09 NOTE — PROGRESS NOTES
PT DAILY TREATMENT NOTE     Patient Name: Luci King  Date:2017  : 1970  [x]  Patient  Verified  Payor:  / Plan: [facility]  ACTIVE DUTY AND DEPENDENTS / Product Type:  /    In time:9:08  Out time:10:08  Total Treatment Time (min): 60  Visit #: 7 of 12    Treatment Area: Pain in right hip [M25.551]    SUBJECTIVE  Pain Level (0-10 scale): 310  Any medication changes, allergies to medications, adverse drug reactions, diagnosis change, or new procedure performed?: [x] No    [] Yes (see summary sheet for update)  Subjective functional status/changes:   [] No changes reported  \"I think I'm out. I can feel it. \"    OBJECTIVE    Modality rationale: decrease edema, decrease inflammation and decrease pain to improve the patients ability to tolerate positions and ADLs.    Min Type Additional Details    [] Estim:  []Unatt       []IFC  []Premod                        []Other:  []w/ice   []w/heat  Position:  Location:    [] Estim: []Att    []TENS instruct  []NMES                    []Other:  []w/US   []w/ice   []w/heat  Position:  Location:    []  Traction: [] Cervical       []Lumbar                       [] Prone          []Supine                       []Intermittent   []Continuous Lbs:  [] before manual  [] after manual   8 [x]  Ultrasound: []Continuous   [x] Pulsed (50%)                           [x]1MHz   []3MHz W/cm2: 1.2  Location: right greater trochanter    []  Iontophoresis with dexamethasone         Location: [] Take home patch   [] In clinic    []  Ice     []  heat  []  Ice massage  []  Laser   []  Anodyne Position:  Location:    []  Laser with stim  []  Other:  Position:  Location:    []  Vasopneumatic Device Pressure:       [] lo [] med [] hi   Temperature: [] lo [] med [] hi   [] Skin assessment post-treatment:  []intact []redness- no adverse reaction    []redness - adverse reaction:      min []Eval                  []Re-Eval       19 min Therapeutic Exercise:  [] See flow sheet :   Rationale: increase ROM to improve the patients ability to tolerate sitting activities. min Therapeutic Activity:  []  See flow sheet :        23 min Neuromuscular Re-education:  []  See flow sheet :   Rationale: increase strength, improve coordination and increase proprioception  to improve the patients ability to tolerate positions and ADLs. 10 min Manual Therapy:    METs to correct right innominate upslip   Rationale: decrease pain, increase tissue extensibility and correct joint alignment and joint mechanics to normalize ADLs and tolerate positions. min Gait Training:  ___ feet with ___ device on level surfaces with ___ level of assist   Rationale: With   [] TE   [] TA   [] neuro   [] other: Patient Education: [x] Review HEP    [] Progressed/Changed HEP based on:   [] positioning   [] body mechanics   [] transfers   [] heat/ice application    [] other:      Other Objective/Functional Measures:   right LE shorter in supine and sitting- right innominate upslip     Pain Level (0-10 scale) post treatment: 1/10    ASSESSMENT/Changes in Function:    Pt's right innominate continues to be unstable requiring corrections of right upslip most of the time. Pain has significantly decreased since [facility] and function is normalizing.     Patient will continue to benefit from skilled PT services to modify and progress therapeutic interventions, address functional mobility deficits, address ROM deficits, address strength deficits, analyze and address soft tissue restrictions, analyze and cue movement patterns, analyze and modify body mechanics/ergonomics, assess and modify postural abnormalities and instruct in home and community integration to attain remaining goals.      [x]  See Plan of Care  []  See progress note/recertification  []  See Discharge Summary      Progress towards goals / Updated goals:  Short Term Goals (To be accomplished in 3 weeks)                        1) Pt will be independent and compliant with HEP to achieve other goals. Status at initial evaluation: Pt is not independent with exercises. Current status: progressing (pt is 50% independent with exercises.) 7/26/17                            5) Innominates will remain aligned and stable in order to decrease pain and improve pt's ability to ambulate and climb stairs without increased pain. Status at initial evaluation: right innominate upslip (successfully corrected on 7/18/17)  Current status: not met (right innominate upslip last corrected on 8/09/17) 8/09/17                            3) Increase strength of bilateral LEs by 1/3 grade in order to allow pt to transfer sit to stand independently and normalize ADLs. Status at initial evaluation:  strength of LEs grossly 5/5 all muscle groups except bilateral hip flexors (4+/5 right, 4/5 left), bilateral hip IR (4+/5 bilaterally), and right knee flexors (4/5)   Current status: not reassessed                            4) Decrease pain in right LE to 3/10 at worst during ADLs in order to allow pt to normalize ADLs. Status at initial evaluation: pain 5/10 at worst  Current status: progressing (pain 4/10 at worst) 8/02/17                            5) Abolish trigger points in bilateral buttocks in order to decrease pain to 3/10 at worst during sitting, rising, standing, and walking. Status at initial evaluation:  trigger points in the bilateral buttocks/piriformis muscles   Current status: not reassessed      Long Term Goals (To be accomplished in 6 weeks)                        1) Pt will demonstrate ability to climb up and down 42 steps with reciprocal gait pattern with 1 hand rail in order to mobilize independently and safely. Status at initial evaluation: not assessed  Current status: not reassessed                            2) Pt will be able to stand up to 8 hours at work in order to return to work duties.   Status at initial evaluation: not assessed  Current status: not reassessed                            3) Decrease pain in right LE to 1/10 at worst during ADLs in order to return to normal activities. Status at initial evaluation: pain 5/10 at worst  Current status:  progressing (pain 4/10 at worst) 8/02/17                            4) Increase strength of bilateral LEs to 5/5 without increased pain during resisted motions to allow pt ambulate, push, and pull without increased pain. Status at initial evaluation:  strength of LEs grossly 5/5 all muscle groups except bilateral hip flexors (4+/5 right, 4/5 left), bilateral hip IR (4+/5 bilaterally), and right knee flexors (4/5)  Current status: not reassessed                            5) Decrease tenderness to palpation of bilateral greater trochanters to mild in order to allow pt to stand > 30 minutes and sleep undisturbed for at least 6 hours.   Status at initial evaluation: moderate-significant tenderness to palpation bilateral greater trochanters  Current status: not reassessed     PLAN  []  Upgrade activities as tolerated     [x]  Continue plan of care  []  Update interventions per flow sheet       []  Discharge due to:_  []  Other:_      Jeff Reynolds PT 8/9/2017  4:29 PM    Future Appointments  Date Time Provider Chris Liz   8/15/2017 9:30 AM TALITA Brumfield [facility]   8/17/2017 10:30 AM TALITA Youngblood [facility]

## 2017-08-15 ENCOUNTER — HOSPITAL ENCOUNTER (OUTPATIENT)
Dept: PHYSICAL THERAPY | Age: 47
Discharge: HOME | End: 2017-08-15
Payer: OTHER GOVERNMENT

## 2017-08-15 PROCEDURE — 97140 MANUAL THERAPY 1/> REGIONS: CPT

## 2017-08-15 PROCEDURE — 97164 PT RE-EVAL EST PLAN CARE: CPT

## 2017-08-15 PROCEDURE — 97112 NEUROMUSCULAR REEDUCATION: CPT

## 2017-08-15 PROCEDURE — 97035 APP MDLTY 1+ULTRASOUND EA 15: CPT

## 2017-08-15 PROCEDURE — 97110 THERAPEUTIC EXERCISES: CPT

## 2017-08-15 NOTE — PROGRESS NOTES
[facility] in [address] DANIELA Titus Monday [place] [address] [address]  Phone: [phone]      Fax:  [phone]    Physical Therapy Progress Note  Patient name: Jamison Strickland Start of Care: 17   Referral source: Ivy Worleykoby,* : 1970   Medical/Treatment Diagnosis: Pain in right hip [M25.551] Onset Date: early 2017   Prior Hospitalization: see medical history Provider#: [id number]   Medications: Verified on Patient Summary List    Comorbidities: CA  Prior Level of Function: no deficits working at [employer] as   Visits from [facility] of Care: 8     Missed Visits: 0    Established Goals:         Excellent           Good         Limited           None  [x] Increased ROM   []  [x]  []  []  [x] Increased Strength  []  [x]  [x]  []  [] Increased Mobility  []  []  []  []   [x] Decreased Pain   []  [x]  []  []  [] Decreased Swelling  []  []  []  []    Key Functional Changes:    Pt has demonstrated significant improvement in overall pain relief and she is able to work up to 16 hours (double shift) some days. Pt's innominates are unstable and repeatedly subluxate requiring METs to correct mal-alignments. Mal-alignments are not consistent and pt is unable to determine independently what exercises are necessary to correct the differing subluxations. Pt may benefit from an SI belt to help stabilize the innominates. Pt continues to displays tenderness to palpation at the bilateral greater trochanters consistent with possible bursitis. LEs' strength has normalized to 5/5 all muscle groups except bilateral hip flexors. Goals addressed this period:  Short Term Goals (To be accomplished in 3 weeks)                        7) Pt will be independent and compliant with HEP to achieve other goals. Status at initial evaluation: Pt is not independent with exercises. Current status: nearly met (pt is 90% independent with exercises. )                            2) Innominates will remain aligned and stable in order to decrease pain and improve pt's ability to ambulate and climb stairs without increased pain. Status at initial evaluation: right innominate upslip (successfully corrected on 7/18/17)  Current status: not met (posteriorly rotated right innominate and right innominate upslip corrected on 8/15/17)                            3) Increase strength of bilateral LEs by 1/3 grade in order to allow pt to transfer sit to stand independently and normalize ADLs. Status at initial evaluation:  strength of LEs grossly 5/5 all muscle groups except bilateral hip flexors (4+/5 right, 4/5 left), bilateral hip IR (4+/5 bilaterally), and right knee flexors (4/5)   Current status: met for all except right hip flexors                            4) Decrease pain in right LE to 3/10 at worst during ADLs in order to allow pt to normalize ADLs. Status at initial evaluation: pain 5/10 at worst  Current status: progressing (pain 4/10 at worst)                            5) Abolish trigger points in bilateral buttocks in order to decrease pain to 3/10 at worst during sitting, rising, standing, and walking. Status at initial evaluation:  trigger points in the bilateral buttocks/piriformis muscles   Current status: not reassessed      Long Term Goals (To be accomplished in 6 weeks)                        1) Pt will demonstrate ability to climb up and down 42 steps with reciprocal gait pattern with 1 hand rail in order to mobilize independently and safely. Status at initial evaluation: not assessed  Current status: met (mild increase of right hip (0.5/10 increase))                            2) Pt will be able to stand up to 8 hours at work in order to return to work duties.   Status at initial evaluation: not assessed  Current status: met (pt reports standing/walking makes pain better)                            3) Decrease pain in right LE to 1/10 at worst during ADLs in order to return to normal activities. Status at initial evaluation: pain 5/10 at worst  Current status:  progressing (pain 4/10 at worst)                          4) Increase strength of bilateral LEs to 5/5 without increased pain during resisted motions to allow pt ambulate, push, and pull without increased pain. Status at initial evaluation:  strength of LEs grossly 5/5 all muscle groups except bilateral hip flexors (4+/5 right, 4/5 left), bilateral hip IR (4+/5 bilaterally), and right knee flexors (4/5)  Current status: met for all except bilateral hip flexors (4+/5 each)                            5) Decrease tenderness to palpation of bilateral greater trochanters to mild in order to allow pt to stand > 30 minutes and sleep undisturbed for at least 6 hours. Status at initial evaluation: moderate-significant tenderness to palpation bilateral greater trochanters  Current status: progressing (moderate tenderness to palpation right, mild tenderness to palpation left greater trochanters)    Updated Goals: to be achieved in 4 weeks:   1) Continue with unmet goals above. ASSESSMENT/RECOMMENDATIONS:    Pt would benefit from additional skilled PT to address remaining pelvis instability and strength and functional deficits.      [x]Continue therapy per initial plan/protocol at a frequency of  2 x per week for 4 weeks  []Continue therapy with the following recommended changes:_____________________      _____________________________________________________________________  []Discontinue therapy progressing towards or have reached established goals  []Discontinue therapy due to lack of appreciable progress towards goals  []Discontinue therapy due to lack of attendance or compliance  []Await Physician's recommendations/decisions regarding therapy  []Other:________________________________________________________________    Thank you for this referral.   Moriah Joseph V, PT 8/15/2017 7:42 PM    NOTE TO PHYSICIAN:  PLEASE COMPLETE THE ORDERS BELOW AND   FAX TO [facility]: [phone]  If you are unable to process this request in 24 hours please contact our office: [phone]      ____ I have read the above report and request that my patient continue therapy with the following changes/special instructions:  ____ I have read the above report and request that my patient be discharged from therapy    Physician's Signature:_____________________ Date:___________Time:__________

## 2017-08-15 NOTE — PROGRESS NOTES
PT DAILY TREATMENT NOTE     Patient Name: Placido Tierney  Date:8/15/2017  : 1970  [x]  Patient  Verified  Payor:  / Plan: [facility]  ACTIVE DUTY AND DEPENDENTS / Product Type:  /    In time:9:37  Out time:10:40  Total Treatment Time (min): 63  Visit #: 8 of 12    Treatment Area: Pain in right hip [M25.551]    SUBJECTIVE  Pain Level (0-10 scale): 3/10  Any medication changes, allergies to medications, adverse drug reactions, diagnosis change, or new procedure performed?: [x] No    [] Yes (see summary sheet for update)  Subjective functional status/changes:   [] No changes reported  \"I finally had a day off after working 14 days straight. I went shopping and then sat most of the day and now today my right hip is really sore and hurting more. \"    OBJECTIVE    Modality rationale: decrease edema, decrease inflammation and decrease pain to improve the patients ability to tolerate positions and ADLs.    Min Type Additional Details    [] Estim:  []Unatt       []IFC  []Premod                        []Other:  []w/ice   []w/heat  Position:  Location:    [] Estim: []Att    []TENS instruct  []NMES                    []Other:  []w/US   []w/ice   []w/heat  Position:  Location:    []  Traction: [] Cervical       []Lumbar                       [] Prone          []Supine                       []Intermittent   []Continuous Lbs:  [] before manual  [] after manual   8 [x]  Ultrasound: []Continuous   [x] Pulsed (50%)                           [x]1MHz   []3MHz W/cm2: 1.2  Location: right greater trochanter    []  Iontophoresis with dexamethasone         Location: [] Take home patch   [] In clinic    []  Ice     []  heat  []  Ice massage  []  Laser   []  Anodyne Position:  Location:    []  Laser with stim  []  Other:  Position:  Location:    []  Vasopneumatic Device Pressure:       [] lo [] med [] hi   Temperature: [] lo [] med [] hi   [] Skin assessment post-treatment:  []intact []redness- no adverse reaction    []redness - adverse reaction:     10 min []Eval                  [x]Re-Eval        16 min Therapeutic Exercise:  [] See flow sheet :   Rationale: increase ROM to improve the patients ability to tolerate sitting activities. min Therapeutic Activity:  []  See flow sheet :        16 min Neuromuscular Re-education:  []  See flow sheet :   Rationale: increase strength, improve coordination and increase proprioception  to improve the patients ability to tolerate positions and ADLs. 13 min Manual Therapy:    METs to correct posteriorly rotated right innominate- right innominate upslip    Rationale: decrease pain, increase tissue extensibility and correct joint alignment and joint mechanics to normalize ADLs and tolerate positions. min Gait Training:  ___ feet with ___ device on level surfaces with ___ level of assist   Rationale: With   [] TE   [] TA   [] neuro   [] other: Patient Education: [x] Review HEP    [] Progressed/Changed HEP based on:   [] positioning   [] body mechanics   [] transfers   [] heat/ice application    [] other:      Other Objective/Functional Measures:   right LE lengthens in sitting- posteriorly rotated right innominate  right LE shorter in supine and sitting after METs to correct posteriorly rotated right innominate- right innominate upslip  2+ TTP right, 1+ TTP left greater trochanters  ITB flexibility: 1 inch above table B LEs (improved 3 inches B)  Strength hip flexors: 4+/5 bilaterally, all other muscle groups grossly 5/5. SUDHAKAR test: 10.5 inches from table left, 11.25 inches from table right      Pain Level (0-10 scale) post treatment: 2/10    ASSESSMENT/Changes in Function:    Pt has demonstrated significant improvement in overall pain relief and she is able to work up to 16 hours (double shift) some days. Pt's innominates are unstable and repeatedly subluxate requiring METs to correct mal-alignments.   Mal-alignments are not consistent and pt is unable to determine independently what exercises are necessary to correct the differing subluxations. Pt may benefit from an SI belt to help stabilize the innominates. Pt continues to displays tenderness to palpation at the bilateral greater trochanters consistent with possible bursitis. LEs' strength has normalized to 5/5 all muscle groups except bilateral hip flexors. Pt would benefit from additional skilled PT to address remaining pelvis instability and strength and functional deficits. Patient will continue to benefit from skilled PT services to modify and progress therapeutic interventions, address functional mobility deficits, address ROM deficits, address strength deficits, analyze and address soft tissue restrictions, analyze and cue movement patterns, analyze and modify body mechanics/ergonomics, assess and modify postural abnormalities and instruct in home and community integration to attain remaining goals. []  See Plan of Care  []  See progress note/recertification  []  See Discharge Summary         Progress towards goals / Updated goals:  Short Term Goals (To be accomplished in 3 weeks)                        1) Pt will be independent and compliant with HEP to achieve other goals. Status at initial evaluation: Pt is not independent with exercises. Current status: nearly met (pt is 90% independent with exercises.) 8/15/17                            7) Innominates will remain aligned and stable in order to decrease pain and improve pt's ability to ambulate and climb stairs without increased pain. Status at initial evaluation: right innominate upslip (successfully corrected on 7/18/17)  Current status: not met (posteriorly rotated right innominate and right innominate upslip corrected on 8/15/17) 8/15/17                            3) Increase strength of bilateral LEs by 1/3 grade in order to allow pt to transfer sit to stand independently and normalize ADLs.   Status at initial evaluation:  strength of LEs grossly 5/5 all muscle groups except bilateral hip flexors (4+/5 right, 4/5 left), bilateral hip IR (4+/5 bilaterally), and right knee flexors (4/5)   Current status: met for all except right hip flexors 8/15/17                            4) Decrease pain in right LE to 3/10 at worst during ADLs in order to allow pt to normalize ADLs. Status at initial evaluation: pain 5/10 at worst  Current status: progressing (pain 4/10 at worst) 8/15/17                            5) Abolish trigger points in bilateral buttocks in order to decrease pain to 3/10 at worst during sitting, rising, standing, and walking. Status at initial evaluation:  trigger points in the bilateral buttocks/piriformis muscles   Current status: not reassessed      Long Term Goals (To be accomplished in 6 weeks)                        1) Pt will demonstrate ability to climb up and down 42 steps with reciprocal gait pattern with 1 hand rail in order to mobilize independently and safely. Status at initial evaluation: not assessed  Current status: met (mild increase of right hip (0.5/10 increase)) 8/15/17                            2) Pt will be able to stand up to 8 hours at work in order to return to work duties. Status at initial evaluation: not assessed  Current status: met (pt reports standing/walking makes pain better) 8/15/17                            3) Decrease pain in right LE to 1/10 at worst during ADLs in order to return to normal activities. Status at initial evaluation: pain 5/10 at worst  Current status:  progressing (pain 4/10 at worst) 8/15/17                            4) Increase strength of bilateral LEs to 5/5 without increased pain during resisted motions to allow pt ambulate, push, and pull without increased pain.   Status at initial evaluation:  strength of LEs grossly 5/5 all muscle groups except bilateral hip flexors (4+/5 right, 4/5 left), bilateral hip IR (4+/5 bilaterally), and right knee flexors (4/5)  Current status: met for all except bilateral hip flexors (4+/5 each) 8/15/17                            5) Decrease tenderness to palpation of bilateral greater trochanters to mild in order to allow pt to stand > 30 minutes and sleep undisturbed for at least 6 hours.   Status at initial evaluation: moderate-significant tenderness to palpation bilateral greater trochanters  Current status: progressing (moderate tenderness to palpation right, mild tenderness to palpation left greater trochanters) 8/15/17    PLAN  []  Upgrade activities as tolerated     []  Continue plan of care  []  Update interventions per flow sheet       []  Discharge due to:_  []  Other:_      Didier Tapia PT 8/15/2017  9:51 AM    Future Appointments  Date Time Provider Chris Liz   8/17/2017 10:30 AM Wilmer Storm PT [facility]

## 2017-08-17 ENCOUNTER — HOSPITAL ENCOUNTER (OUTPATIENT)
Dept: PHYSICAL THERAPY | Age: 47
Discharge: HOME | End: 2017-08-17
Payer: OTHER GOVERNMENT

## 2017-08-17 PROCEDURE — 97112 NEUROMUSCULAR REEDUCATION: CPT

## 2017-08-17 PROCEDURE — 97110 THERAPEUTIC EXERCISES: CPT

## 2017-08-17 NOTE — PROGRESS NOTES
PT DAILY TREATMENT NOTE     Patient Name: Koko Harp  Date:2017  : 1970  [x]  Patient  Verified  Payor:  / Plan: [facility]  ACTIVE DUTY AND DEPENDENTS / Product Type: Lexie Andres /    In time:10:35  Out time:11:30  Total Treatment Time (min): 55  Visit #: 9  16    Treatment Area: Pain in right hip [M25.551]    SUBJECTIVE  Pain Level (0-10 scale): 1.5/10  Any medication changes, allergies to medications, adverse drug reactions, diagnosis change, or new procedure performed?: [x] No    [] Yes (see summary sheet for update)  Subjective functional status/changes:   [] No changes reported  \"I feel so much better. \"    OBJECTIVE    Modality rationale:    Min Type Additional Details    [] Estim:  []Unatt       []IFC  []Premod                        []Other:  []w/ice   []w/heat  Position:  Location:    [] Estim: []Att    []TENS instruct  []NMES                    []Other:  []w/US   []w/ice   []w/heat  Position:  Location:    []  Traction: [] Cervical       []Lumbar                       [] Prone          []Supine                       []Intermittent   []Continuous Lbs:  [] before manual  [] after manual    []  Ultrasound: []Continuous   [] Pulsed                           []1MHz   []3MHz W/cm2:  Location:    []  Iontophoresis with dexamethasone         Location: [] Take home patch   [] In clinic    []  Ice     []  heat  []  Ice massage  []  Laser   []  Anodyne Position:  Location:    []  Laser with stim  []  Other:  Position:  Location:    []  Vasopneumatic Device Pressure:       [] lo [] med [] hi   Temperature: [] lo [] med [] hi   [] Skin assessment post-treatment:  []intact []redness- no adverse reaction    []redness - adverse reaction:      min []Eval                  []Re-Eval       31 min Therapeutic Exercise:  [x] See flow sheet :   Rationale: increase ROM to improve the patients ability to tolerate sitting activities.      min Therapeutic Activity:  []  See flow sheet :        24 min Neuromuscular Re-education:  [x]  See flow sheet :   Rationale: increase strength, improve coordination and increase proprioception  to improve the patients ability to tolerate positions and ADLs. min Manual Therapy:          min Gait Training:  ___ feet with ___ device on level surfaces with ___ level of assist   Rationale: With   [] TE   [] TA   [] neuro   [] other: Patient Education: [x] Review HEP    [] Progressed/Changed HEP based on:   [] positioning   [] body mechanics   [] transfers   [] heat/ice application    [] other:      Other Objective/Functional Measures:   Innominates aligned. Pain Level (0-10 scale) post treatment: 1.5/10    ASSESSMENT/Changes in Function:    Innominates remaining aligned and stable. Pain much reduced compared to last visit. Patient will continue to benefit from skilled PT services to modify and progress therapeutic interventions, address functional mobility deficits, address ROM deficits, address strength deficits, analyze and address soft tissue restrictions, analyze and cue movement patterns, analyze and modify body mechanics/ergonomics, assess and modify postural abnormalities and instruct in home and community integration to attain remaining goals. []  See Plan of Care  []  See progress note/recertification  []  See Discharge Summary         Progress towards goals / Updated goals:  Short Term Goals (To be accomplished in 3 weeks)                        1) Pt will be independent and compliant with HEP to achieve other goals. Status at initial evaluation: Pt is not independent with exercises. Status last progress note (8/15/17): nearly met (pt is 90% independent with exercises.)  Current status: not reassessed                            2) Innominates will remain aligned and stable in order to decrease pain and improve pt's ability to ambulate and climb stairs without increased pain.   Status at initial evaluation: right innominate upslip (successfully corrected on 7/18/17)  Status last progress note (8/15/17): not met (posteriorly rotated right innominate and right innominate upslip corrected on 8/15/17)  Current status: not reassessed                            3) Increase strength of bilateral LEs by 1/3 grade in order to allow pt to transfer sit to stand independently and normalize ADLs. Status at initial evaluation:  strength of LEs grossly 5/5 all muscle groups except bilateral hip flexors (4+/5 right, 4/5 left), bilateral hip IR (4+/5 bilaterally), and right knee flexors (4/5)  Status last progress note (8/15/17): met for all except right hip flexors   Current status: not reassessed                            4) Decrease pain in right LE to 3/10 at worst during ADLs in order to allow pt to normalize ADLs. Status at initial evaluation: pain 5/10 at worst  Status last progress note (8/15/17): progressing (pain 4/10 at worst)  Current status: not reassessed                            5) Abolish trigger points in bilateral buttocks in order to decrease pain to 3/10 at worst during sitting, rising, standing, and walking. Status at initial evaluation:  trigger points in the bilateral buttocks/piriformis muscles  Status last progress note (8/15/17): not reassessed  Current status: not reassessed      Long Term Goals (To be accomplished in 6 weeks)                        1) Pt will demonstrate ability to climb up and down 42 steps with reciprocal gait pattern with 1 hand rail in order to mobilize independently and safely. Status at initial evaluation: not assessed  Status last progress note (8/15/17): met (mild increase of right hip (0.5/10 increase))  Current status: not reassessed                            2) Pt will be able to stand up to 8 hours at work in order to return to work duties.   Status at initial evaluation: not assessed  Status last progress note (8/15/17): met (pt reports standing/walking makes pain better)  Current status: not reassessed                            3) Decrease pain in right LE to 1/10 at worst during ADLs in order to return to normal activities. Status at initial evaluation: pain 5/10 at worst  Status last progress note (8/15/17): progressing (pain 4/10 at worst)  Current status: not reassessed                            4) Increase strength of bilateral LEs to 5/5 without increased pain during resisted motions to allow pt ambulate, push, and pull without increased pain. Status at initial evaluation:  strength of LEs grossly 5/5 all muscle groups except bilateral hip flexors (4+/5 right, 4/5 left), bilateral hip IR (4+/5 bilaterally), and right knee flexors (4/5)  Status last progress note (8/15/17): met for all except bilateral hip flexors (4+/5 each)  Current status: not reassessed                             5) Decrease tenderness to palpation of bilateral greater trochanters to mild in order to allow pt to stand > 30 minutes and sleep undisturbed for at least 6 hours.   Status at initial evaluation: moderate-significant tenderness to palpation bilateral greater trochanters  Status last progress note (8/15/17): progressing (moderate tenderness to palpation right, mild tenderness to palpation left greater trochanters)  Current status: not reassessed        PLAN  []  Upgrade activities as tolerated     []  Continue plan of care  []  Update interventions per flow sheet       []  Discharge due to:_  []  Other:_      Jeff Reynolds PT 8/17/2017  12:45 PM    Future Appointments  Date Time Provider Chris Liz   8/22/2017 9:30 AM Jose Jean V, PT [facility] [address]   8/24/2017 10:00 AM [address], PT [facility] [address]   8/29/2017 9:30 AM [address], PT [facility] [address]   8/30/2017 2:30 PM TALITA Brumfield

## 2017-08-22 ENCOUNTER — HOSPITAL ENCOUNTER (OUTPATIENT)
Dept: PHYSICAL THERAPY | Age: 47
Discharge: HOME | End: 2017-08-22
Payer: OTHER GOVERNMENT

## 2017-08-22 PROCEDURE — 97112 NEUROMUSCULAR REEDUCATION: CPT

## 2017-08-22 PROCEDURE — 97110 THERAPEUTIC EXERCISES: CPT

## 2017-08-22 PROCEDURE — 97035 APP MDLTY 1+ULTRASOUND EA 15: CPT

## 2017-08-22 NOTE — PROGRESS NOTES
PT DAILY TREATMENT NOTE     Patient Name: Bernard Dinero  Date:2017  : 1970  [x]  Patient  Verified  Payor:  / Plan: [facility]  ACTIVE DUTY AND DEPENDENTS / Product Type:  /    In time:9:35  Out time:10:40  Total Treatment Time (min): 65  Visit #: 10 of 16    Treatment Area: Pain in right hip [M25.551]    SUBJECTIVE  Pain Level (0-10 scale): 3/10  Any medication changes, allergies to medications, adverse drug reactions, diagnosis change, or new procedure performed?: [x] No    [] Yes (see summary sheet for update)  Subjective functional status/changes:   [] No changes reported  \"I ran over my left foot with a 500 pound cart. It hurt a lot for 2 days then started to get better. My right hip pain has moved from the side to more in the buttocks. \"    OBJECTIVE    Modality rationale: decrease edema, decrease inflammation and decrease pain to improve the patients ability to tolerate positions and ADLs.    Min Type Additional Details    [] Estim:  []Unatt       []IFC  []Premod                        []Other:  []w/ice   []w/heat  Position:  Location:    [] Estim: []Att    []TENS instruct  []NMES                    []Other:  []w/US   []w/ice   []w/heat  Position:  Location:    []  Traction: [] Cervical       []Lumbar                       [] Prone          []Supine                       []Intermittent   []Continuous Lbs:  [] before manual  [] after manual   8 [x]  Ultrasound: []Continuous   [x] Pulsed (50%)                           []1MHz   [x]3MHz W/cm2: 1.2  Location: right greater trochanter    []  Iontophoresis with dexamethasone         Location: [] Take home patch   [] In clinic    []  Ice     []  heat  []  Ice massage  []  Laser   []  Anodyne Position:  Location:    []  Laser with stim  []  Other:  Position:  Location:    []  Vasopneumatic Device Pressure:       [] lo [] med [] hi   Temperature: [] lo [] med [] hi   [] Skin assessment post-treatment:  []intact []redness- no adverse reaction    []redness - adverse reaction:      min []Eval                  []Re-Eval       34 min Therapeutic Exercise:  [x] See flow sheet :   Rationale: increase ROM to improve the patients ability to tolerate sitting activities. min Therapeutic Activity:  []  See flow sheet :        23 min Neuromuscular Re-education:  [x]  See flow sheet :   Rationale: increase strength, improve coordination and increase proprioception  to improve the patients ability to tolerate positions and ADLs. min Manual Therapy:          min Gait Training:  ___ feet with ___ device on level surfaces with ___ level of assist   Rationale: With   [] TE   [] TA   [] neuro   [] other: Patient Education: [x] Review HEP    [] Progressed/Changed HEP based on:   [] positioning   [] body mechanics   [] transfers   [] heat/ice application    [] other:      Other Objective/Functional Measures:   1+TTP right greater trochanter  innominates aligned  FOTO: 50/100     Pain Level (0-10 scale) post treatment: 3/10    ASSESSMENT/Changes in Function:    Innoiminates remaining aligned and stable. Greater trochanter tenderness to palpation decreasing. Strength and function are improving and normalizing. Patient will continue to benefit from skilled PT services to modify and progress therapeutic interventions, address functional mobility deficits, address ROM deficits, address strength deficits, analyze and address soft tissue restrictions, analyze and cue movement patterns, analyze and modify body mechanics/ergonomics, assess and modify postural abnormalities and instruct in home and community integration to attain remaining goals.      []  See Plan of Care  []  See progress note/recertification  []  See Discharge Summary      Progress towards goals / Updated goals:  Short Term Goals (To be accomplished in 3 weeks)                        1) Pt will be independent and compliant with HEP to achieve other goals.   Status at initial evaluation: Pt is not independent with exercises. Status last progress note (8/15/17): nearly met (pt is 90% independent with exercises.)  Current status: not reassessed                            2) Innominates will remain aligned and stable in order to decrease pain and improve pt's ability to ambulate and climb stairs without increased pain. Status at initial evaluation: right innominate upslip (successfully corrected on 7/18/17)  Status last progress note (8/15/17): not met (posteriorly rotated right innominate and right innominate upslip corrected on 8/15/17)  Current status: met (innominaes aligned) 8/22/17                            3) Increase strength of bilateral LEs by 1/3 grade in order to allow pt to transfer sit to stand independently and normalize ADLs. Status at initial evaluation:  strength of LEs grossly 5/5 all muscle groups except bilateral hip flexors (4+/5 right, 4/5 left), bilateral hip IR (4+/5 bilaterally), and right knee flexors (4/5)  Status last progress note (8/15/17): met for all except right hip flexors   Current status: not reassessed                            4) Decrease pain in right LE to 3/10 at worst during ADLs in order to allow pt to normalize ADLs. Status at initial evaluation: pain 5/10 at worst  Status last progress note (8/15/17): progressing (pain 4/10 at worst)  Current status: not reassessed                            5) Abolish trigger points in bilateral buttocks in order to decrease pain to 3/10 at worst during sitting, rising, standing, and walking. Status at initial evaluation:  trigger points in the bilateral buttocks/piriformis muscles  Status last progress note (8/15/17): not reassessed  Current status: not reassessed      Long Term Goals (To be accomplished in 6 weeks)                        1) Pt will demonstrate ability to climb up and down 42 steps with reciprocal gait pattern with 1 hand rail in order to mobilize independently and safely.   Status at initial evaluation: not assessed  Status last progress note (8/15/17): met (mild increase of right hip (0.5/10 increase))  Current status: not reassessed                            2) Pt will be able to stand up to 8 hours at work in order to return to work duties. Status at initial evaluation: not assessed  Status last progress note (8/15/17): met (pt reports standing/walking makes pain better)  Current status: not reassessed                            3) Decrease pain in right LE to 1/10 at worst during ADLs in order to return to normal activities. Status at initial evaluation: pain 5/10 at worst  Status last progress note (8/15/17): progressing (pain 4/10 at worst)  Current status: not reassessed                             4) Increase strength of bilateral LEs to 5/5 without increased pain during resisted motions to allow pt ambulate, push, and pull without increased pain. Status at initial evaluation:  strength of LEs grossly 5/5 all muscle groups except bilateral hip flexors (4+/5 right, 4/5 left), bilateral hip IR (4+/5 bilaterally), and right knee flexors (4/5)  Status last progress note (8/15/17): met for all except bilateral hip flexors (4+/5 each)  Current status: not reassessed                             5) Decrease tenderness to palpation of bilateral greater trochanters to mild in order to allow pt to stand > 30 minutes and sleep undisturbed for at least 6 hours.   Status at initial evaluation: moderate-significant tenderness to palpation bilateral greater trochanters  Status last progress note (8/15/17): progressing (moderate tenderness to palpation right, mild tenderness to palpation left greater trochanters)  Current status: met (mild tenderness to palpation right greater trochanter) 8/22/17     PLAN  [x]  Upgrade activities as tolerated     [x]  Continue plan of care  []  Update interventions per flow sheet       []  Discharge due to:_  []  Other:_      Richar Maldonado PT 8/22/2017  10:23 AM    Future Appointments  Date Time Provider Chris Liz   8/24/2017 10:00 AM Vera newman [place] LARISSA [facility]   8/29/2017 9:30 AM TALITA Can [facility]   8/30/2017 2:30 PM TALITA Ramachandran [facility]

## 2017-08-24 ENCOUNTER — HOSPITAL ENCOUNTER (OUTPATIENT)
Dept: PHYSICAL THERAPY | Age: 47
Discharge: HOME | End: 2017-08-24
Payer: OTHER GOVERNMENT

## 2017-08-24 PROCEDURE — 97112 NEUROMUSCULAR REEDUCATION: CPT

## 2017-08-24 PROCEDURE — 97140 MANUAL THERAPY 1/> REGIONS: CPT

## 2017-08-24 PROCEDURE — 97110 THERAPEUTIC EXERCISES: CPT

## 2017-08-24 NOTE — PROGRESS NOTES
PT DAILY TREATMENT NOTE     Patient Name: Bernard Dinero  Date:2017  : 1970  [x]  Patient  Verified  Payor: [facility] / Plan: [email or web address] [facility] [address] AND DEPENDENTS / Product Type: [place] /    In time:10:00  Out time:11:00  Total Treatment Time (min): 60  Visit #: 11 of 16    Treatment Area: Pain in right hip [M25.551]    SUBJECTIVE  Pain Level (0-10 scale): 210  Any medication changes, allergies to medications, adverse drug reactions, diagnosis change, or new procedure performed?: [x] No    [] Yes (see summary sheet for update)  Subjective functional status/changes:   [] No changes reported  \"I have a truck coming in tomorrow. \"    OBJECTIVE    Modality rationale:    Min Type Additional Details    [] Estim:  []Unatt       []IFC  []Premod                        []Other:  []w/ice   []w/heat  Position:  Location:    [] Estim: []Att    []TENS instruct  []NMES                    []Other:  []w/US   []w/ice   []w/heat  Position:  Location:    []  Traction: [] Cervical       []Lumbar                       [] Prone          []Supine                       []Intermittent   []Continuous Lbs:  [] before manual  [] after manual    []  Ultrasound: []Continuous   [] Pulsed                           []1MHz   []3MHz W/cm2:  Location:    []  Iontophoresis with dexamethasone         Location: [] Take home patch   [] In clinic    []  Ice     []  heat  []  Ice massage  []  Laser   []  Anodyne Position:  Location:    []  Laser with stim  []  Other:  Position:  Location:    []  Vasopneumatic Device Pressure:       [] lo [] med [] hi   Temperature: [] lo [] med [] hi   [] Skin assessment post-treatment:  []intact []redness- no adverse reaction    []redness - adverse reaction:      min []Eval                  []Re-Eval       21 min Therapeutic Exercise:  [x] See flow sheet :   Rationale: increase ROM to improve the patients ability to tolerate sitting activities.      min Therapeutic Activity:  []  See flow sheet : 29 min Neuromuscular Re-education:  [x]  See flow sheet :   Rationale: increase strength, improve coordination and increase proprioception  to improve the patients ability to tolerate positions and ADLs. 10 min Manual Therapy:    Self METs to correct posteriorly rotated right innominate   Rationale: decrease pain, increase tissue extensibility and correct joint alignment and joint mechanics to normalize ADLs and tolerate positions. min Gait Training:  ___ feet with ___ device on level surfaces with ___ level of assist   Rationale: With   [] TE   [] TA   [] neuro   [] other: Patient Education: [x] Review HEP    [] Progressed/Changed HEP based on:   [] positioning   [] body mechanics   [] transfers   [] heat/ice application    [] other:      Other Objective/Functional Measures:   left LE longer in supine by 2 mm and shortens in sitting- posteriorly rotated right innominate     Pain Level (0-10 scale) post treatment: 2/10    ASSESSMENT/Changes in Function:    Pain remaining mild during all ADLs. Innominates were mal-aligned again today, but pt was able to self correct using METs. Patient will continue to benefit from skilled PT services to modify and progress therapeutic interventions, address functional mobility deficits, address ROM deficits, address strength deficits, analyze and address soft tissue restrictions, analyze and cue movement patterns, analyze and modify body mechanics/ergonomics, assess and modify postural abnormalities and instruct in home and community integration to attain remaining goals.      []  See Plan of Care  []  See progress note/recertification  []  See Discharge Summary      Progress towards goals / Updated goals:  Short Term Goals (To be accomplished in 3 weeks)                        1) Pt will be independent and compliant with HEP to achieve other goals. Status at initial evaluation: Pt is not independent with exercises.   Status last progress note (8/15/17): nearly met (pt is 90% independent with exercises.)  Current status: not reassessed                            2) Innominates will remain aligned and stable in order to decrease pain and improve pt's ability to ambulate and climb stairs without increased pain. Status at initial evaluation: right innominate upslip (successfully corrected on 7/18/17)  Status last progress note (8/15/17): not met (posteriorly rotated right innominate and right innominate upslip corrected on 8/15/17)  Current status: met (innominaes aligned) 8/22/17                            3) Increase strength of bilateral LEs by 1/3 grade in order to allow pt to transfer sit to stand independently and normalize ADLs. Status at initial evaluation:  strength of LEs grossly 5/5 all muscle groups except bilateral hip flexors (4+/5 right, 4/5 left), bilateral hip IR (4+/5 bilaterally), and right knee flexors (4/5)  Status last progress note (8/15/17): met for all except right hip flexors   Current status: not reassessed                            4) Decrease pain in right LE to 3/10 at worst during ADLs in order to allow pt to normalize ADLs. Status at initial evaluation: pain 5/10 at worst  Status last progress note (8/15/17): progressing (pain 4/10 at worst)  Current status: not reassessed                            5) Abolish trigger points in bilateral buttocks in order to decrease pain to 3/10 at worst during sitting, rising, standing, and walking. Status at initial evaluation:  trigger points in the bilateral buttocks/piriformis muscles  Status last progress note (8/15/17): not reassessed  Current status: not reassessed      Long Term Goals (To be accomplished in 6 weeks)                        1) Pt will demonstrate ability to climb up and down 42 steps with reciprocal gait pattern with 1 hand rail in order to mobilize independently and safely.   Status at initial evaluation: not assessed  Status last progress note (8/15/17): met (mild increase of right hip (0.5/10 increase))  Current status: not reassessed                            2) Pt will be able to stand up to 8 hours at work in order to return to work duties. Status at initial evaluation: not assessed  Status last progress note (8/15/17): met (pt reports standing/walking makes pain better)  Current status: not reassessed                            3) Decrease pain in right LE to 1/10 at worst during ADLs in order to return to normal activities. Status at initial evaluation: pain 5/10 at worst  Status last progress note (8/15/17): progressing (pain 4/10 at worst)  Current status: not reassessed                             4) Increase strength of bilateral LEs to 5/5 without increased pain during resisted motions to allow pt ambulate, push, and pull without increased pain. Status at initial evaluation:  strength of LEs grossly 5/5 all muscle groups except bilateral hip flexors (4+/5 right, 4/5 left), bilateral hip IR (4+/5 bilaterally), and right knee flexors (4/5)  Status last progress note (8/15/17): met for all except bilateral hip flexors (4+/5 each)  Current status: not reassessed                             5) Decrease tenderness to palpation of bilateral greater trochanters to mild in order to allow pt to stand > 30 minutes and sleep undisturbed for at least 6 hours.   Status at initial evaluation: moderate-significant tenderness to palpation bilateral greater trochanters  Status last progress note (8/15/17): progressing (moderate tenderness to palpation right, mild tenderness to palpation left greater trochanters)  Current status: met (mild tenderness to palpation right greater trochanter) 8/22/17      PLAN  []  Upgrade activities as tolerated     [x]  Continue plan of care  []  Update interventions per flow sheet       []  Discharge due to:_  []  Other:_      Lisa El, PT 8/24/2017  10:06 AM    Future Appointments  Date Time Provider Chris Liz   8/29/2017 9:30 AM Pineda Paniagua [facility]   8/30/2017 2:30 PM TALITA Ruiz [facility]

## 2017-08-29 ENCOUNTER — HOSPITAL ENCOUNTER (OUTPATIENT)
Dept: PHYSICAL THERAPY | Age: 47
Discharge: HOME | End: 2017-08-29
Payer: OTHER GOVERNMENT

## 2017-08-29 PROCEDURE — 97112 NEUROMUSCULAR REEDUCATION: CPT

## 2017-08-29 PROCEDURE — 97110 THERAPEUTIC EXERCISES: CPT

## 2017-08-29 NOTE — PROGRESS NOTES
PT DAILY TREATMENT NOTE     Patient Name: Carolyne Lovett  Date:2017  : 1970  [x]  Patient  Verified  Payor:  / Plan: [facility]  ACTIVE DUTY AND DEPENDENTS / Product Type: [address] [place] [address] /    In time:9:37  Out time:10:17  Total Treatment Time (min): 40  Visit #: 12 of 16    Treatment Area: Pain in right hip [M25.551]    SUBJECTIVE  Pain Level (0-10 scale): 2-3/10  Any medication changes, allergies to medications, adverse drug reactions, diagnosis change, or new procedure performed?: [x] No    [] Yes (see summary sheet for update)  Subjective functional status/changes:   [] No changes reported  \"I unloaded the truck at work and pain did not get worse. Pain 2-3/10 at worst since last treatment. \"    OBJECTIVE    Modality rationale:    Min Type Additional Details    [] Estim:  []Unatt       []IFC  []Premod                        []Other:  []w/ice   []w/heat  Position:  Location:    [] Estim: []Att    []TENS instruct  []NMES                    []Other:  []w/US   []w/ice   []w/heat  Position:  Location:    []  Traction: [] Cervical       []Lumbar                       [] Prone          []Supine                       []Intermittent   []Continuous Lbs:  [] before manual  [] after manual    []  Ultrasound: []Continuous   [] Pulsed                           []1MHz   []3MHz W/cm2:  Location:    []  Iontophoresis with dexamethasone         Location: [] Take home patch   [] In clinic    []  Ice     []  heat  []  Ice massage  []  Laser   []  Anodyne Position:  Location:    []  Laser with stim  []  Other:  Position:  Location:    []  Vasopneumatic Device Pressure:       [] lo [] med [] hi   Temperature: [] lo [] med [] hi   [] Skin assessment post-treatment:  []intact []redness- no adverse reaction    []redness - adverse reaction:      min []Eval                  []Re-Eval       12 min Therapeutic Exercise:  [x] See flow sheet :   Rationale: increase ROM to improve the patients ability to tolerate sitting activities. min Therapeutic Activity:  []  See flow sheet :        28 min Neuromuscular Re-education:  []  See flow sheet :   Rationale: increase strength, improve coordination and increase proprioception  to improve the patients ability to tolerate positions and ADLs. min Manual Therapy:          min Gait Training:  ___ feet with ___ device on level surfaces with ___ level of assist   Rationale: With   [] TE   [] TA   [] neuro   [] other: Patient Education: [x] Review HEP    [] Progressed/Changed HEP based on:   [] positioning   [] body mechanics   [] transfers   [] heat/ice application    [] other:      Other Objective/Functional Measures:   Innominates aligned. Pain Level (0-10 scale) post treatment: 2-3/10    ASSESSMENT/Changes in Function:    Pt reports mild pain during all ADLs at work and home. Pt reported that topical med for her knees helped to relieve the pain. Patient will continue to benefit from skilled PT services to modify and progress therapeutic interventions, address functional mobility deficits, address ROM deficits, address strength deficits, analyze and address soft tissue restrictions, analyze and cue movement patterns, analyze and modify body mechanics/ergonomics, assess and modify postural abnormalities and instruct in home and community integration to attain remaining goals.      []  See Plan of Care  []  See progress note/recertification  []  See Discharge Summary      Progress towards goals / Updated goals:  Short Term Goals (To be accomplished in 3 weeks)                        1) Pt will be independent and compliant with HEP to achieve other goals. Status at initial evaluation: Pt is not independent with exercises.   Status last progress note (8/15/17): nearly met (pt is 90% independent with exercises.)  Current status: not reassessed                            2) Innominates will remain aligned and stable in order to decrease pain and improve pt's ability to ambulate and climb stairs without increased pain. Status at initial evaluation: right innominate upslip (successfully corrected on 7/18/17)  Status last progress note (8/15/17): not met (posteriorly rotated right innominate and right innominate upslip corrected on 8/15/17)  Current status: met (innominaes aligned) 8/29/17                            3) Increase strength of bilateral LEs by 1/3 grade in order to allow pt to transfer sit to stand independently and normalize ADLs. Status at initial evaluation:  strength of LEs grossly 5/5 all muscle groups except bilateral hip flexors (4+/5 right, 4/5 left), bilateral hip IR (4+/5 bilaterally), and right knee flexors (4/5)  Status last progress note (8/15/17): met for all except right hip flexors   Current status: not reassessed                            4) Decrease pain in right LE to 3/10 at worst during ADLs in order to allow pt to normalize ADLs. Status at initial evaluation: pain 5/10 at worst  Status last progress note (8/15/17): progressing (pain 4/10 at worst)  Current status: met (pain 2-3/10 at worst) 8/29/17                            5) Abolish trigger points in bilateral buttocks in order to decrease pain to 3/10 at worst during sitting, rising, standing, and walking. Status at initial evaluation:  trigger points in the bilateral buttocks/piriformis muscles  Status last progress note (8/15/17): not reassessed  Current status: not reassessed      Long Term Goals (To be accomplished in 6 weeks)                        1) Pt will demonstrate ability to climb up and down 42 steps with reciprocal gait pattern with 1 hand rail in order to mobilize independently and safely. Status at initial evaluation: not assessed  Status last progress note (8/15/17): met (mild increase of right hip (0.5/10 increase))  Current status: not reassessed                            2) Pt will be able to stand up to 8 hours at work in order to return to work duties.   Status at initial evaluation: not assessed  Status last progress note (8/15/17): met (pt reports standing/walking makes pain better)  Current status: not reassessed                            3) Decrease pain in right LE to 1/10 at worst during ADLs in order to return to normal activities. Status at initial evaluation: pain 5/10 at worst  Status last progress note (8/15/17): progressing (pain 4/10 at worst)  Current status: not reassessed                             4) Increase strength of bilateral LEs to 5/5 without increased pain during resisted motions to allow pt ambulate, push, and pull without increased pain. Status at initial evaluation:  strength of LEs grossly 5/5 all muscle groups except bilateral hip flexors (4+/5 right, 4/5 left), bilateral hip IR (4+/5 bilaterally), and right knee flexors (4/5)  Status last progress note (8/15/17): met for all except bilateral hip flexors (4+/5 each)  Current status: not reassessed                            5) Decrease tenderness to palpation of bilateral greater trochanters to mild in order to allow pt to stand > 30 minutes and sleep undisturbed for at least 6 hours.   Status at initial evaluation: moderate-significant tenderness to palpation bilateral greater trochanters  Status last progress note (8/15/17): progressing (moderate tenderness to palpation right, mild tenderness to palpation left greater trochanters)  Current status: met (mild tenderness to palpation right greater trochanter) 8/22/17      PLAN  []  Upgrade activities as tolerated     [x]  Continue plan of care  []  Update interventions per flow sheet       []  Discharge due to:_  []  Other:_      Raz Fritz PT 8/29/2017  9:55 AM    Future Appointments  Date Time Provider Chris Liz   8/30/2017 9:00 AM Raz Fritz PT [facility]

## 2017-08-30 ENCOUNTER — HOSPITAL ENCOUNTER (OUTPATIENT)
Dept: PHYSICAL THERAPY | Age: 47
Discharge: HOME | End: 2017-08-30
Payer: OTHER GOVERNMENT

## 2017-08-30 PROCEDURE — 97112 NEUROMUSCULAR REEDUCATION: CPT

## 2017-08-30 PROCEDURE — 97110 THERAPEUTIC EXERCISES: CPT

## 2017-08-30 NOTE — PROGRESS NOTES
PT DAILY TREATMENT NOTE     Patient Name: Nikunj Fan  Date:2017  : 1970  [x]  Patient  Verified  Payor:  / Plan: [facility]  ACTIVE DUTY AND DEPENDENTS / Product Type: Caroline Kareem /    In time:9:08  Out time:10:18  Total Treatment Time (min): 70  Visit #: 13 of 16    Treatment Area: Pain in right hip [M25.551]    SUBJECTIVE  Pain Level (0-10 scale): 2/10  Any medication changes, allergies to medications, adverse drug reactions, diagnosis change, or new procedure performed?: [x] No    [] Yes (see summary sheet for update)  Subjective functional status/changes:   [] No changes reported  \"No problems since yesterday's treatment. Pain staying at 2/10. \"    OBJECTIVE    Modality rationale:    Min Type Additional Details    [] Estim:  []Unatt       []IFC  []Premod                        []Other:  []w/ice   []w/heat  Position:  Location:    [] Estim: []Att    []TENS instruct  []NMES                    []Other:  []w/US   []w/ice   []w/heat  Position:  Location:    []  Traction: [] Cervical       []Lumbar                       [] Prone          []Supine                       []Intermittent   []Continuous Lbs:  [] before manual  [] after manual    []  Ultrasound: []Continuous   [] Pulsed                           []1MHz   []3MHz W/cm2:  Location:    []  Iontophoresis with dexamethasone         Location: [] Take home patch   [] In clinic    []  Ice     []  heat  []  Ice massage  []  Laser   []  Anodyne Position:  Location:    []  Laser with stim  []  Other:  Position:  Location:    []  Vasopneumatic Device Pressure:       [] lo [] med [] hi   Temperature: [] lo [] med [] hi   [] Skin assessment post-treatment:  []intact []redness- no adverse reaction    []redness - adverse reaction:      min []Eval                  []Re-Eval       32 min Therapeutic Exercise:  [x] See flow sheet :  Added treadmill,   Rationale: increase ROM to improve the patients ability to tolerate sitting activities.      min Therapeutic Activity:  []  See flow sheet :        33 min Neuromuscular Re-education:  []  See flow sheet :  Added SLS trampoline ball throw   Rationale: increase strength, improve coordination and increase proprioception  to improve the patients ability to tolerate positions and ADLs. 5 min Manual Therapy:    METs to correct right innominate upslip   Rationale: decrease pain, increase tissue extensibility and correct joint alignment and joint mechanics to normalize ADLs and tolerate positions. min Gait Training:  ___ feet with ___ device on level surfaces with ___ level of assist   Rationale: With   [] TE   [] TA   [] neuro   [] other: Patient Education: [x] Review HEP    [] Progressed/Changed HEP based on:   [] positioning   [] body mechanics   [] transfers   [] heat/ice application    [] other:      Other Objective/Functional Measures:   right LE shorter than left by 1-2 mm in sitting and supine- right innominate upslip  Hip flexors: 4+/5 bilaterally     Pain Level (0-10 scale) post treatment: 2/10    ASSESSMENT/Changes in Function:    Pt reported right tenor fascia rich pain when she performed trunk flexion from supine position, but after exercises pain abolished    Patient will continue to benefit from skilled PT services to modify and progress therapeutic interventions, address functional mobility deficits, address ROM deficits, address strength deficits, analyze and address soft tissue restrictions, analyze and cue movement patterns, analyze and modify body mechanics/ergonomics, assess and modify postural abnormalities and instruct in home and community integration to attain remaining goals. []  See Plan of Care  []  See progress note/recertification  []  See Discharge Summary         Progress towards goals / Updated goals:  Short Term Goals (To be accomplished in 3 weeks)                        1) Pt will be independent and compliant with HEP to achieve other goals.   Status at initial evaluation: Pt is not independent with exercises. Status last progress note (8/15/17): nearly met (pt is 90% independent with exercises.)  Current status: not reassessed                            2) Innominates will remain aligned and stable in order to decrease pain and improve pt's ability to ambulate and climb stairs without increased pain. Status at initial evaluation: right innominate upslip (successfully corrected on 7/18/17)  Status last progress note (8/15/17): not met (posteriorly rotated right innominate and right innominate upslip corrected on 8/15/17)  Current status: met (innominates aligned) 8/29/17                            3) Increase strength of bilateral LEs by 1/3 grade in order to allow pt to transfer sit to stand independently and normalize ADLs. Status at initial evaluation:  strength of LEs grossly 5/5 all muscle groups except bilateral hip flexors (4+/5 right, 4/5 left), bilateral hip IR (4+/5 bilaterally), and right knee flexors (4/5)  Status last progress note (8/15/17): met for all except right hip flexors   Current status: met for all except hip flexors: 4+/5 bilaterally 8/30/17                            4) Decrease pain in right LE to 3/10 at worst during ADLs in order to allow pt to normalize ADLs. Status at initial evaluation: pain 5/10 at worst  Status last progress note (8/15/17): progressing (pain 4/10 at worst)  Current status: met (pain 2-3/10 at worst) 8/29/17                            5) Abolish trigger points in bilateral buttocks in order to decrease pain to 3/10 at worst during sitting, rising, standing, and walking.   Status at initial evaluation:  trigger points in the bilateral buttocks/piriformis muscles  Status last progress note (8/15/17): not reassessed  Current status: not reassessed      Long Term Goals (To be accomplished in 6 weeks)                        1) Pt will demonstrate ability to climb up and down 42 steps with reciprocal gait pattern with 1 hand rail in order to mobilize independently and safely. Status at initial evaluation: not assessed  Status last progress note (8/15/17): met (mild increase of right hip (0.5/10 increase))  Current status: not reassessed                            2) Pt will be able to stand up to 8 hours at work in order to return to work duties. Status at initial evaluation: not assessed  Status last progress note (8/15/17): met (pt reports standing/walking makes pain better)  Current status: not reassessed                            3) Decrease pain in right LE to 1/10 at worst during ADLs in order to return to normal activities. Status at initial evaluation: pain 5/10 at worst  Status last progress note (8/15/17): progressing (pain 4/10 at worst)  Current status: not reassessed                             4) Increase strength of bilateral LEs to 5/5 without increased pain during resisted motions to allow pt ambulate, push, and pull without increased pain. Status at initial evaluation:  strength of LEs grossly 5/5 all muscle groups except bilateral hip flexors (4+/5 right, 4/5 left), bilateral hip IR (4+/5 bilaterally), and right knee flexors (4/5)  Status last progress note (8/15/17): met for all except bilateral hip flexors (4+/5 each)  Current status: not reassessed                            5) Decrease tenderness to palpation of bilateral greater trochanters to mild in order to allow pt to stand > 30 minutes and sleep undisturbed for at least 6 hours.   Status at initial evaluation: moderate-significant tenderness to palpation bilateral greater trochanters  Status last progress note (8/15/17): progressing (moderate tenderness to palpation right, mild tenderness to palpation left greater trochanters)  Current status: met (mild tenderness to palpation right greater trochanter) 8/22/17     PLAN  [x]  Upgrade activities as tolerated     [x]  Continue plan of care  []  Update interventions per flow sheet       []  Discharge due to:_  []  Other:_      Edie Raines V, PT 8/30/2017  9:46 AM    No future appointments.

## 2017-09-05 ENCOUNTER — HOSPITAL ENCOUNTER (OUTPATIENT)
Dept: PHYSICAL THERAPY | Age: 47
Discharge: HOME | End: 2017-09-05
Payer: OTHER GOVERNMENT

## 2017-09-05 PROCEDURE — 97110 THERAPEUTIC EXERCISES: CPT

## 2017-09-05 PROCEDURE — 97112 NEUROMUSCULAR REEDUCATION: CPT

## 2017-09-05 NOTE — PROGRESS NOTES
PT DAILY TREATMENT NOTE     Patient Name: Placido Tierney  Date:2017  : 1970  [x]  Patient  Verified  Payor:  / Plan: [facility]  ACTIVE DUTY AND DEPENDENTS / Product Type: Jose Snide /    In time:1:03  Out time:2:06  Total Treatment Time (min): 63  Visit #: 14 of 16    Treatment Area: Pain in right hip [M25.551]    SUBJECTIVE  Pain Level (0-10 scale): 2/10  Any medication changes, allergies to medications, adverse drug reactions, diagnosis change, or new procedure performed?: [x] No    [] Yes (see summary sheet for update)  Subjective functional status/changes:   [] No changes reported  \"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 \"    OBJECTIVE    Modality rationale:    Min Type Additional Details    [] Estim:  []Unatt       []IFC  []Premod                        []Other:  []w/ice   []w/heat  Position:  Location:    [] Estim: []Att    []TENS instruct  []NMES                    []Other:  []w/US   []w/ice   []w/heat  Position:  Location:    []  Traction: [] Cervical       []Lumbar                       [] Prone          []Supine                       []Intermittent   []Continuous Lbs:  [] before manual  [] after manual    []  Ultrasound: []Continuous   [] Pulsed                           []1MHz   []3MHz W/cm2:  Location:    []  Iontophoresis with dexamethasone         Location: [] Take home patch   [] In clinic    []  Ice     []  heat  []  Ice massage  []  Laser   []  Anodyne Position:  Location:    []  Laser with stim  []  Other:  Position:  Location:    []  Vasopneumatic Device Pressure:       [] lo [] med [] hi   Temperature: [] lo [] med [] hi   [] Skin assessment post-treatment:  []intact []redness- no adverse reaction    []redness - adverse reaction:      min []Eval                  []Re-Eval       40 min Therapeutic Exercise:  [x] See flow sheet :  Increased speed of treadmill to 1.8 mph   Rationale: increase ROM to improve the patients ability to tolerate sitting activities. min Therapeutic Activity:  []  See flow sheet :        23 min Neuromuscular Re-education:  [x]  See flow sheet :  Added gluteus medius (JOANIE)   Rationale: increase strength, improve coordination and increase proprioception  to improve the patients ability to tolerate positions and ADLs. min Manual Therapy:          min Gait Training:  ___ feet with ___ device on level surfaces with ___ level of assist   Rationale: With   [] TE   [] TA   [] neuro   [] other: Patient Education: [x] Review HEP    [] Progressed/Changed HEP based on:   [] positioning   [] body mechanics   [] transfers   [] heat/ice application    [] other:      Other Objective/Functional Measures:   No objective measures taken today. 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 Strength continues to improve. Patient will continue to benefit from skilled PT services to modify and progress therapeutic interventions, address functional mobility deficits, address ROM deficits, address strength deficits, analyze and address soft tissue restrictions, analyze and cue movement patterns, analyze and modify body mechanics/ergonomics, assess and modify postural abnormalities and instruct in home and community integration to attain remaining goals.      []  See Plan of Care  []  See progress note/recertification  []  See Discharge Summary      Progress towards goals / Updated goals:  Short Term Goals (To be accomplished in 3 weeks)                        1) Pt will be independent and compliant with HEP to achieve other goals. Status at initial evaluation: Pt is not independent with exercises.   Status last progress note (8/15/17): nearly met (pt is 90% independent with exercises.)  Current status: not reassessed                            2) Innominates will remain aligned and stable in order to decrease pain and improve pt's ability to ambulate and climb stairs without increased pain. Status at initial evaluation: right innominate upslip (successfully corrected on 7/18/17)  Status last progress note (8/15/17): not met (posteriorly rotated right innominate and right innominate upslip corrected on 8/15/17)  Current status: met (innominates aligned) 8/29/17                            3) Increase strength of bilateral LEs by 1/3 grade in order to allow pt to transfer sit to stand independently and normalize ADLs. Status at initial evaluation:  strength of LEs grossly 5/5 all muscle groups except bilateral hip flexors (4+/5 right, 4/5 left), bilateral hip IR (4+/5 bilaterally), and right knee flexors (4/5)  Status last progress note (8/15/17): met for all except right hip flexors   Current status: met for all except hip flexors: 4+/5 bilaterally 8/30/17                            4) Decrease pain in right LE to 3/10 at worst during ADLs in order to allow pt to normalize ADLs. Status at initial evaluation: pain 5/10 at worst  Status last progress note (8/15/17): progressing (pain 4/10 at worst)  Current status: met (pain 2-3/10 at worst) 8/29/17                            5) Abolish trigger points in bilateral buttocks in order to decrease pain to 3/10 at worst during sitting, rising, standing, and walking. Status at initial evaluation:  trigger points in the bilateral buttocks/piriformis muscles  Status last progress note (8/15/17): not reassessed  Current status: not reassessed      Long Term Goals (To be accomplished in 6 weeks)                        1) Pt will demonstrate ability to climb up and down 42 steps with reciprocal gait pattern with 1 hand rail in order to mobilize independently and safely.   Status at initial evaluation: not assessed  Status last progress note (8/15/17): met (mild increase of right hip (0.5/10 increase))  Current status: not reassessed                            2) Pt will be able to stand up to 8 hours at work in order to return to work duties. Status at initial evaluation: not assessed  Status last progress note (8/15/17): met (pt reports standing/walking makes pain better)  Current status: not reassessed                            3) Decrease pain in right LE to 1/10 at worst during ADLs in order to return to normal activities. Status at initial evaluation: pain 5/10 at worst  Status last progress note (8/15/17): progressing (pain 4/10 at worst)  Current status: not reassessed                             4) Increase strength of bilateral LEs to 5/5 without increased pain during resisted motions to allow pt ambulate, push, and pull without increased pain. Status at initial evaluation:  strength of LEs grossly 5/5 all muscle groups except bilateral hip flexors (4+/5 right, 4/5 left), bilateral hip IR (4+/5 bilaterally), and right knee flexors (4/5)  Status last progress note (8/15/17): met for all except bilateral hip flexors (4+/5 each)  Current status: not reassessed                            5) Decrease tenderness to palpation of bilateral greater trochanters to mild in order to allow pt to stand > 30 minutes and sleep undisturbed for at least 6 hours.   Status at initial evaluation: moderate-significant tenderness to palpation bilateral greater trochanters  Status last progress note (8/15/17): progressing (moderate tenderness to palpation right, mild tenderness to palpation left greater trochanters)  Current status: met (mild tenderness to palpation right greater trochanter) 8/22/17      PLAN  []  Upgrade activities as tolerated     [x]  Continue plan of care  []  Update interventions per flow sheet       []  Discharge due to:_  []  Other:_      Milo Nichols PT 9/5/2017  1:03 PM    Future Appointments  Date Time Provider Chris Liz   9/6/2017 8:30 AM Milo Nichols PT [facility]

## 2017-09-06 ENCOUNTER — HOSPITAL ENCOUNTER (OUTPATIENT)
Dept: PHYSICAL THERAPY | Age: 47
Discharge: HOME | End: 2017-09-06
Payer: OTHER GOVERNMENT

## 2017-09-06 PROCEDURE — 97110 THERAPEUTIC EXERCISES: CPT

## 2017-09-06 PROCEDURE — 97140 MANUAL THERAPY 1/> REGIONS: CPT

## 2017-09-06 NOTE — PROGRESS NOTES
PT DAILY TREATMENT NOTE     Patient Name: Emir Feliciano  Date:2017  : 1970  [x]  Patient  Verified  Payor: [facility] / Plan: [facility] [address] AND DEPENDENTS / Product Type: Tor Pontiff /    In time:8:40  Out time:9:39  Total Treatment Time (min): 61  Visit #: 15 of 16    Treatment Area: Pain in right hip [M25.551]    SUBJECTIVE  Pain Level (0-10 scale): 1-2/10  Any medication changes, allergies to medications, adverse drug reactions, diagnosis change, or new procedure performed?: [x] No    [] Yes (see summary sheet for update)  Subjective functional status/changes:   [] No changes reported  \"I am sore all over. 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 \"    OBJECTIVE    Modality rationale:    Min Type Additional Details    [] Estim:  []Unatt       []IFC  []Premod                        []Other:  []w/ice   []w/heat  Position:  Location:    [] Estim: []Att    []TENS instruct  []NMES                    []Other:  []w/US   []w/ice   []w/heat  Position:  Location:    []  Traction: [] Cervical       []Lumbar                       [] Prone          []Supine                       []Intermittent   []Continuous Lbs:  [] before manual  [] after manual    []  Ultrasound: []Continuous   [] Pulsed                           []1MHz   []3MHz W/cm2:  Location:    []  Iontophoresis with dexamethasone         Location: [] Take home patch   [] In clinic    []  Ice     []  heat  []  Ice massage  []  Laser   []  Anodyne Position:  Location:    []  Laser with stim  []  Other:  Position:  Location:    []  Vasopneumatic Device Pressure:       [] lo [] med [] hi   Temperature: [] lo [] med [] hi   [] Skin assessment post-treatment:  []intact []redness- no adverse reaction    []redness - adverse reaction:      min []Eval                  []Re-Eval       34 min Therapeutic Exercise:  [x] See flow sheet :  Progressed bridges to [employer] bridges   Rationale: increase ROM to improve the patients ability to tolerate sitting activities. min Therapeutic Activity:  []  See flow sheet :         min Neuromuscular Re-education:  []  See flow sheet :       25 min Manual Therapy:    Trigger point release B piriformis in prone, self METs to correct posteriorly rotated right innominate   Rationale: decrease pain, increase ROM, increase tissue extensibility and decrease trigger points to tolerate positions and ADLs. min Gait Training:  ___ feet with ___ device on level surfaces with ___ level of assist   Rationale: With   [] TE   [] TA   [] neuro   [] other: Patient Education: [x] Review HEP    [] Progressed/Changed HEP based on:   [] positioning   [] body mechanics   [] transfers   [] heat/ice application    [] other:      Other Objective/Functional Measures:   right LE shorter in supine and lengthens in sitting- posteriorly rotated right innominate  Gale test: 1 inch below table on right, level with table on left    Pain Level (0-10 scale) post treatment: 1-2/10    ASSESSMENT/Changes in Function:    Trigger points in B buttocks are decreasing in size. Pain remaining mild during all ADLs including work. Patient will continue to benefit from skilled PT services to modify and progress therapeutic interventions, address functional mobility deficits, address ROM deficits, address strength deficits, analyze and address soft tissue restrictions, analyze and cue movement patterns, analyze and modify body mechanics/ergonomics, assess and modify postural abnormalities and instruct in home and community integration to attain remaining goals.      []  See Plan of Care  []  See progress note/recertification  []  See Discharge Summary      Progress towards goals / Updated goals:  Short Term Goals (To be accomplished in 3 weeks)                        1) Pt will be independent and compliant with HEP to achieve other goals. Status at initial evaluation: Pt is not independent with exercises.   Status last progress note (8/15/17): nearly met (pt is 90% independent with exercises.)  Current status: met (pt is 100% independent with exercises) 9/06/17                            2) Innominates will remain aligned and stable in order to decrease pain and improve pt's ability to ambulate and climb stairs without increased pain. Status at initial evaluation: right innominate upslip (successfully corrected on 7/18/17)  Status last progress note (8/15/17): not met (posteriorly rotated right innominate and right innominate upslip corrected on 8/15/17)  Current status: not met (posteriorly rotated right innominate corrected on 9/06/17) 9/06/17                            3) Increase strength of bilateral LEs by 1/3 grade in order to allow pt to transfer sit to stand independently and normalize ADLs. Status at initial evaluation:  strength of LEs grossly 5/5 all muscle groups except bilateral hip flexors (4+/5 right, 4/5 left), bilateral hip IR (4+/5 bilaterally), and right knee flexors (4/5)  Status last progress note (8/15/17): met for all except right hip flexors   Current status: met for all except hip flexors: 4+/5 bilaterally 8/30/17                            4) Decrease pain in right LE to 3/10 at worst during ADLs in order to allow pt to normalize ADLs. Status at initial evaluation: pain 5/10 at worst  Status last progress note (8/15/17): progressing (pain 4/10 at worst)  Current status: met (pain 2-3/10 at worst) 8/29/17                            5) Abolish trigger points in bilateral buttocks in order to decrease pain to 3/10 at worst during sitting, rising, standing, and walking.   Status at initial evaluation:  trigger points in the bilateral buttocks/piriformis muscles  Status last progress note (8/15/17): not reassessed  Current status: not reassessed      Long Term Goals (To be accomplished in 6 weeks)                        1) Pt will demonstrate ability to climb up and down 42 steps with reciprocal gait pattern with 1 hand rail in order to mobilize independently and safely. Status at initial evaluation: not assessed  Status last progress note (8/15/17): met (mild increase of right hip (0.5/10 increase))                            2) Pt will be able to stand up to 8 hours at work in order to return to work duties. Status at initial evaluation: not assessed  Status last progress note (8/15/17): met (pt reports standing/walking makes pain better)                            3) Decrease pain in right LE to 1/10 at worst during ADLs in order to return to normal activities. Status at initial evaluation: pain 5/10 at worst  Status last progress note (8/15/17): progressing (pain 4/10 at worst)  Current status: not reassessed                             4) Increase strength of bilateral LEs to 5/5 without increased pain during resisted motions to allow pt ambulate, push, and pull without increased pain. Status at initial evaluation:  strength of LEs grossly 5/5 all muscle groups except bilateral hip flexors (4+/5 right, 4/5 left), bilateral hip IR (4+/5 bilaterally), and right knee flexors (4/5)  Status last progress note (8/15/17): met for all except bilateral hip flexors (4+/5 each)  Current status: met for all (5/5) except bilateral hip flexors (4+/5 each) 9/06/17                            5) Decrease tenderness to palpation of bilateral greater trochanters to mild in order to allow pt to stand > 30 minutes and sleep undisturbed for at least 6 hours.   Status at initial evaluation: moderate-significant tenderness to palpation bilateral greater trochanters  Status last progress note (8/15/17): progressing (moderate tenderness to palpation right, mild tenderness to palpation left greater trochanters)  Current status: met (mild tenderness to palpation right greater trochanter) 8/22/17     PLAN  []  Upgrade activities as tolerated     []  Continue plan of care  []  Update interventions per flow sheet       []  Discharge due to:_  []  Other:_      Raz Fritz PT 9/6/2017  8:49 AM    Future Appointments  Date Time Provider Chris Liz   9/12/2017 8:00 AM TALITA Schrader [facility]   9/13/2017 9:30 AM TALITA Schrader [facility]

## 2017-09-12 ENCOUNTER — HOSPITAL ENCOUNTER (OUTPATIENT)
Dept: PHYSICAL THERAPY | Age: 47
Discharge: HOME | End: 2017-09-12
Payer: OTHER GOVERNMENT

## 2017-09-12 PROCEDURE — 97140 MANUAL THERAPY 1/> REGIONS: CPT

## 2017-09-12 PROCEDURE — 97112 NEUROMUSCULAR REEDUCATION: CPT

## 2017-09-12 PROCEDURE — 97530 THERAPEUTIC ACTIVITIES: CPT

## 2017-09-12 NOTE — PROGRESS NOTES
[facility] in [address], [facility] [address]  Phone: [phone]      Fax:  [phone]    Physical Therapy Progress Note  Patient name: Vladimir Tucker Start of Care: 17   Referral source: Laura Rakel,* : 1970   Medical/Treatment Diagnosis: Pain in right hip [M25.551] Onset Date: early 2017   Prior Hospitalization: see medical history Provider#: [id number]   Medications: Verified on Patient Summary List    Comorbidities: CA  Prior Level of Function: no deficits working at [employer] as   Visits from [facility] of Care: 16    Missed Visits: 0    Established Goals:         Excellent           Good         Limited           None  [x] Increased ROM   []  [x]  []  []  [x] Increased Strength  []  [x]  []  []  [x] Increased Mobility  []  [x]  []  []   [x] Decreased Pain   [x]  [x]  []  []  [] Decreased Swelling  []  []  []  []    Key Functional Changes:    Tenderness to palpation of the right greater trochanter mild having improved from moderate-significant tenderness to palpation at [facility]. Strength of LEs has normalized to 5/5 all muscle groups except bilateral hip flexors (4+/5 each). Pt's limp abolished after METs corrected innominate alignment. Mechanical spine assessment unclear for directional preference due to low pain level when assessment was performed, but extension movements reduced LBP and right greater trochanter pain. Pt reported a flare up yesterday and pain was significant enough to cause pt to limp for over 2 days before innominate correction abolished limp today. Trigger points in the buttocks still present and may be producing sciatica pain that pt experienced yesterday. Pt may benefit from additional skilled PT to address possibility that remaining pain may be due to L-spine.     Goals addressed this period:  Short Term Goals (To be accomplished in 3 weeks)                        1) Pt will be independent and compliant with HEP to achieve other goals. Status at initial evaluation: Pt is not independent with exercises. Status last progress note (8/15/17): nearly met (pt is 90% independent with exercises.)  Current status: met (pt is 100% independent with exercises)                            2) Innominates will remain aligned and stable in order to decrease pain and improve pt's ability to ambulate and climb stairs without increased pain. Status at initial evaluation: right innominate upslip (successfully corrected on 7/18/17)  Status last progress note (8/15/17): not met (posteriorly rotated right innominate and right innominate upslip corrected on 8/15/17)  Current status: not met (posteriorly rotated right innominate corrected on 9/12/17)                            3) Increase strength of bilateral LEs by 1/3 grade in order to allow pt to transfer sit to stand independently and normalize ADLs. Status at initial evaluation:  strength of LEs grossly 5/5 all muscle groups except bilateral hip flexors (4+/5 right, 4/5 left), bilateral hip IR (4+/5 bilaterally), and right knee flexors (4/5)  Status last progress note (8/15/17): met for all except right hip flexors   Current status: met for all except hip flexors: 4+/5 bilaterally                            4) Decrease pain in right LE to 3/10 at worst during ADLs in order to allow pt to normalize ADLs. Status at initial evaluation: pain 5/10 at worst  Status last progress note (8/15/17): progressing (pain 4/10 at worst)  Current status: met (pain 2-3/10 at worst)                            5) Abolish trigger points in bilateral buttocks in order to decrease pain to 3/10 at worst during sitting, rising, standing, and walking.   Status at initial evaluation:  trigger points in the bilateral buttocks/piriformis muscles  Status last progress note (8/15/17): not reassessed  Current status: progressing (trigger points reduced 50% since [facility])      Long Term Goals (To be accomplished in 6 weeks)                        2) Pt will demonstrate ability to climb up and down 42 steps with reciprocal gait pattern with 1 hand rail in order to mobilize independently and safely. Status at initial evaluation: not assessed  Status last progress note (8/15/17): met (mild increase of right hip (0.5/10 increase))                            2) Pt will be able to stand up to 8 hours at work in order to return to work duties. Status at initial evaluation: not assessed  Status last progress note (8/15/17): met (pt reports standing/walking makes pain better)                            3) Decrease pain in right LE to 1/10 at worst during ADLs in order to return to normal activities. Status at initial evaluation: pain 5/10 at worst  Status last progress note (8/15/17): progressing (pain 4/10 at worst)  Current status: no new changes since last progress note (pain 4/10 at worst)                            4) Increase strength of bilateral LEs to 5/5 without increased pain during resisted motions to allow pt ambulate, push, and pull without increased pain. Status at initial evaluation:  strength of LEs grossly 5/5 all muscle groups except bilateral hip flexors (4+/5 right, 4/5 left), bilateral hip IR (4+/5 bilaterally), and right knee flexors (4/5)  Status last progress note (8/15/17): met for all except bilateral hip flexors (4+/5 each)  Current status: met for all (5/5) except bilateral hip flexors (4+/5 each)                            5) Decrease tenderness to palpation of bilateral greater trochanters to mild in order to allow pt to stand > 30 minutes and sleep undisturbed for at least 6 hours.   Status at initial evaluation: moderate-significant tenderness to palpation bilateral greater trochanters  Status last progress note (8/15/17): progressing (moderate tenderness to palpation right, mild tenderness to palpation left greater trochanters)  Current status: met (mild tenderness to palpation right greater trochanter)      Updated Goals: to be achieved in 4 weeks:   1) Continue with unmet goals above.     ASSESSMENT/RECOMMENDATIONS:  [x]Continue therapy per initial plan/protocol at a frequency of  2 x per week for 4 weeks  []Continue therapy with the following recommended changes:_____________________      _____________________________________________________________________  []Discontinue therapy progressing towards or have reached established goals  []Discontinue therapy due to lack of appreciable progress towards goals  []Discontinue therapy due to lack of attendance or compliance  []Await Physician's recommendations/decisions regarding therapy  []Other:________________________________________________________________    Thank you for this referral.   Tiera Kumar V, PT 9/12/2017 6:28 PM    NOTE TO PHYSICIAN:  PLEASE COMPLETE THE ORDERS BELOW AND   FAX TO [facility]: [phone]  If you are unable to process this request in 24 hours please contact our office: [phone]      ____ I have read the above report and request that my patient continue therapy with the following changes/special instructions:  ____ I have read the above report and request that my patient be discharged from therapy    Physician's Signature:_____________________ Date:___________Time:__________

## 2017-09-12 NOTE — PROGRESS NOTES
PT DAILY TREATMENT NOTE     Patient Name: Vladimir Tucker  Date:2017  : 1970  [x]  Patient  Verified  Payor:  / Plan: [facility]  ACTIVE DUTY AND DEPENDENTS / Product Type:  /    In time:2:06  Out time:2:53  Total Treatment Time (min): 52  Visit #: 16 of 16    Treatment Area: Pain in right hip [M25.551]    SUBJECTIVE  Pain Level (0-10 scale): 2/10  Any medication changes, allergies to medications, adverse drug reactions, diagnosis change, or new procedure performed?: [x] No    [] Yes (see summary sheet for update)  Subjective functional status/changes:   [] No changes reported  \"Pain 4/10 at worst since last treatment. Yesterday my sciatica pain really kicked in and I was limping. \"    OBJECTIVE    Modality rationale:    Min Type Additional Details    [] Estim:  []Unatt       []IFC  []Premod                        []Other:  []w/ice   []w/heat  Position:  Location:    [] Estim: []Att    []TENS instruct  []NMES                    []Other:  []w/US   []w/ice   []w/heat  Position:  Location:    []  Traction: [] Cervical       []Lumbar                       [] Prone          []Supine                       []Intermittent   []Continuous Lbs:  [] before manual  [] after manual    []  Ultrasound: []Continuous   [] Pulsed                           []1MHz   []3MHz W/cm2:  Location:    []  Iontophoresis with dexamethasone         Location: [] Take home patch   [] In clinic    []  Ice     []  heat  []  Ice massage  []  Laser   []  Anodyne Position:  Location:    []  Laser with stim  []  Other:  Position:  Location:    []  Vasopneumatic Device Pressure:       [] lo [] med [] hi   Temperature: [] lo [] med [] hi   [] Skin assessment post-treatment:  []intact []redness- no adverse reaction    []redness - adverse reaction:      min []Eval                  []Re-Eval        min Therapeutic Exercise:  [] See flow sheet :       18 min Therapeutic Activity:  [x]  See flow sheet :  Mechanical spine assessment   Rationale: decrease pain  to improve the patients ability to tolerate positions and ADLs. 19 min Neuromuscular Re-education:  []  See flow sheet :   Rationale: increase strength, improve coordination and increase proprioception  to improve the patients ability to tolerate positions and ADLs. 10 min Manual Therapy:    METs to correct  posteriorly rotated right innominate   Rationale: decrease pain, increase ROM, increase tissue extensibility and decrease trigger points to tolerate positions and ADLs. min Gait Training:  ___ feet with ___ device on level surfaces with ___ level of assist   Rationale: With   [] TE   [] TA   [] neuro   [] other: Patient Education: [x] Review HEP    [] Progressed/Changed HEP based on:   [] positioning   [] body mechanics   [] transfers   [] heat/ice application    [] other:      Other Objective/Functional Measures:   left LE shortens in sitting- posteriorly rotated right innominate  Sitting: right lateral buttocks/greater trochanter (1/10)  Standing: NE (1/10)  BALJIT x10 reps: increased midline LBP (3/10), abolished right lateral trochanter pain (0/10)  Prone x1 minute: decreased LBP (1-2/10), increased right greater trochanter pain (1/10)  LEYLA x1 minute: abolished LBP (0/10), NE right greater trochanter pain (1/10)    REIL x10 reps: NE (0/10 LBP, 1/10 greater trochanter pain)  SKTC x10 reps: Increased LBP (1/10), produced right lower buttocks pain    Pain Level (0-10 scale) post treatment: 1/10    ASSESSMENT/Changes in Function:    Pt's limp abolished after METs corrected innominate alignment. Mechanical spine assessment unclear for directional preference due to low pain level when assessment was performed, but extension movements reduced LBP and right greater trochanter pain. Pt reported a flare up yesterday and pain was significant enough to cause pt to limp for over 2 days before innominate correction abolished limp today.   Trigger points in the buttocks still present and may be producing sciatica pain that pt experienced yesterday. Pt may benefit from additional skilled PT to address possibility that remaining pain may be due to L-spine. Patient will continue to benefit from skilled PT services to modify and progress therapeutic interventions, address functional mobility deficits, address ROM deficits, address strength deficits, analyze and address soft tissue restrictions, analyze and cue movement patterns, analyze and modify body mechanics/ergonomics, assess and modify postural abnormalities and instruct in home and community integration to attain remaining goals. []  See Plan of Care  []  See progress note/recertification  []  See Discharge Summary         Progress towards goals / Updated goals:  Short Term Goals (To be accomplished in 3 weeks)                        1) Pt will be independent and compliant with HEP to achieve other goals. Status at initial evaluation: Pt is not independent with exercises. Status last progress note (8/15/17): nearly met (pt is 90% independent with exercises.)  Current status: met (pt is 100% independent with exercises) 9/06/17                            2) Innominates will remain aligned and stable in order to decrease pain and improve pt's ability to ambulate and climb stairs without increased pain. Status at initial evaluation: right innominate upslip (successfully corrected on 7/18/17)  Status last progress note (8/15/17): not met (posteriorly rotated right innominate and right innominate upslip corrected on 8/15/17)  Current status: not met (posteriorly rotated right innominate corrected on 9/12/17) 9/12/17                            3) Increase strength of bilateral LEs by 1/3 grade in order to allow pt to transfer sit to stand independently and normalize ADLs.   Status at initial evaluation:  strength of LEs grossly 5/5 all muscle groups except bilateral hip flexors (4+/5 right, 4/5 left), bilateral hip IR (4+/5 bilaterally), and right knee flexors (4/5)  Status last progress note (8/15/17): met for all except right hip flexors   Current status: met for all except hip flexors: 4+/5 bilaterally 8/30/17                            4) Decrease pain in right LE to 3/10 at worst during ADLs in order to allow pt to normalize ADLs. Status at initial evaluation: pain 5/10 at worst  Status last progress note (8/15/17): progressing (pain 4/10 at worst)  Current status: met (pain 2-3/10 at worst) 8/29/17                            5) Abolish trigger points in bilateral buttocks in order to decrease pain to 3/10 at worst during sitting, rising, standing, and walking. Status at initial evaluation:  trigger points in the bilateral buttocks/piriformis muscles  Status last progress note (8/15/17): not reassessed  Current status: progressing (trigger points reduced 50% since [facility]) 9/12/17      Long Term Goals (To be accomplished in 6 weeks)                        1) Pt will demonstrate ability to climb up and down 42 steps with reciprocal gait pattern with 1 hand rail in order to mobilize independently and safely. Status at initial evaluation: not assessed  Status last progress note (8/15/17): met (mild increase of right hip (0.5/10 increase))                            2) Pt will be able to stand up to 8 hours at work in order to return to work duties. Status at initial evaluation: not assessed  Status last progress note (8/15/17): met (pt reports standing/walking makes pain better)                            3) Decrease pain in right LE to 1/10 at worst during ADLs in order to return to normal activities.   Status at initial evaluation: pain 5/10 at worst  Status last progress note (8/15/17): progressing (pain 4/10 at worst)  Current status: no new changes since last progress note (pain 4/10 at worst) 9/12/17                            4) Increase strength of bilateral LEs to 5/5 without increased pain during resisted motions to allow pt ambulate, push, and pull without increased pain. Status at initial evaluation:  strength of LEs grossly 5/5 all muscle groups except bilateral hip flexors (4+/5 right, 4/5 left), bilateral hip IR (4+/5 bilaterally), and right knee flexors (4/5)  Status last progress note (8/15/17): met for all except bilateral hip flexors (4+/5 each)  Current status: met for all (5/5) except bilateral hip flexors (4+/5 each) 9/06/17                            5) Decrease tenderness to palpation of bilateral greater trochanters to mild in order to allow pt to stand > 30 minutes and sleep undisturbed for at least 6 hours.   Status at initial evaluation: moderate-significant tenderness to palpation bilateral greater trochanters  Status last progress note (8/15/17): progressing (moderate tenderness to palpation right, mild tenderness to palpation left greater trochanters)  Current status: met (mild tenderness to palpation right greater trochanter) 8/22/17        PLAN  []  Upgrade activities as tolerated     [x]  Continue plan of care  []  Update interventions per flow sheet       []  Discharge due to:_  []  Other:_      Kavon Aguilar PT 9/12/2017  2:09 PM    Future Appointments  Date Time Provider Chris Liz   9/13/2017 9:30 AM TALITA Mallory [facility]

## 2017-09-13 ENCOUNTER — APPOINTMENT (OUTPATIENT)
Dept: PHYSICAL THERAPY | Age: 47
End: 2017-09-13
Payer: OTHER GOVERNMENT

## 2017-09-20 ENCOUNTER — HOSPITAL ENCOUNTER (OUTPATIENT)
Dept: PHYSICAL THERAPY | Age: 47
Discharge: HOME | End: 2017-09-20
Payer: OTHER GOVERNMENT

## 2017-09-20 PROCEDURE — 97110 THERAPEUTIC EXERCISES: CPT

## 2017-09-20 PROCEDURE — 97140 MANUAL THERAPY 1/> REGIONS: CPT

## 2017-09-20 NOTE — PROGRESS NOTES
PT DAILY TREATMENT NOTE     Patient Name: Brayden Flanagan  Date:2017  : 1970  [x]  Patient  Verified  Payor:  / Plan: [employer] [address] AND DEPENDENTS / Product Type: Chelly Schumacher /    In time:10:30  Out time:11:30  Total Treatment Time (min): 60  Visit #: 17 of 24    Treatment Area: Pain in right hip [M25.551]    SUBJECTIVE  Pain Level (0-10 scale): 1/10  Any medication changes, allergies to medications, adverse drug reactions, diagnosis change, or new procedure performed?: [x] No    [] Yes (see summary sheet for update)  Subjective functional status/changes:   [] No changes reported  \"I feel good today, but I was hurting yesterday and the day before. \"    OBJECTIVE    Modality rationale:    Min Type Additional Details    [] Estim:  []Unatt       []IFC  []Premod                        []Other:  []w/ice   []w/heat  Position:  Location:    [] Estim: []Att    []TENS instruct  []NMES                    []Other:  []w/US   []w/ice   []w/heat  Position:  Location:    []  Traction: [] Cervical       []Lumbar                       [] Prone          []Supine                       []Intermittent   []Continuous Lbs:  [] before manual  [] after manual    []  Ultrasound: []Continuous   [] Pulsed                           []1MHz   []3MHz W/cm2:  Location:    []  Iontophoresis with dexamethasone         Location: [] Take home patch   [] In clinic    []  Ice     []  heat  []  Ice massage  []  Laser   []  Anodyne Position:  Location:    []  Laser with stim  []  Other:  Position:  Location:    []  Vasopneumatic Device Pressure:       [] lo [] med [] hi   Temperature: [] lo [] med [] hi   [] Skin assessment post-treatment:  []intact []redness- no adverse reaction    []redness - adverse reaction:      min []Eval                  []Re-Eval       45 min Therapeutic Exercise:  [x] See flow sheet :  Added donkey kicks   Rationale: increase ROM to improve the patients ability to tolerate sitting activities. min Therapeutic Activity:  []  See flow sheet :         min Neuromuscular Re-education:  []  See flow sheet :       15 min Manual Therapy:    Trigger point release right buttocks   Rationale: decrease pain, increase ROM, increase tissue extensibility and decrease trigger points to tolerate positions and driving.     min Gait Training:  ___ feet with ___ device on level surfaces with ___ level of assist   Rationale: With   [] TE   [] TA   [] neuro   [] other: Patient Education: [x] Review HEP    [] Progressed/Changed HEP based on:   [] positioning   [] body mechanics   [] transfers   [] heat/ice application    [] other:      Other Objective/Functional Measures:   Innominates aligned. 1+TTP right greater trochanter    Pain Level (0-10 scale) post treatment: 2/10    ASSESSMENT/Changes in Function:    Innominates have remained aligned and stable since 9/12/17. Pt reports that pain usually occurs at the end portion of a long or hard day at work. Tenderness to palpation at the right greater trochanter mild and nearly abolished. Patient will continue to benefit from skilled PT services to modify and progress therapeutic interventions, address functional mobility deficits, address ROM deficits, address strength deficits, analyze and address soft tissue restrictions, analyze and cue movement patterns, analyze and modify body mechanics/ergonomics, assess and modify postural abnormalities and instruct in home and community integration to attain remaining goals. []  See Plan of Care  []  See progress note/recertification  []  See Discharge Summary         Progress towards goals / Updated goals:  Short Term Goals (To be accomplished in 3 weeks)                        1) Pt will be independent and compliant with HEP to achieve other goals. Status at initial evaluation: Pt is not independent with exercises.   Status last progress note (9/12/17): met (pt is 100% independent with exercises)  Current status: not reassessed                            8) Innominates will remain aligned and stable in order to decrease pain and improve pt's ability to ambulate and climb stairs without increased pain. Status at initial evaluation: right innominate upslip (successfully corrected on 7/18/17)  Status last progress note (9/12/17): not met (posteriorly rotated right innominate corrected on 9/12/17)  Current status: not reassessed                             3) Increase strength of bilateral LEs by 1/3 grade in order to allow pt to transfer sit to stand independently and normalize ADLs. Status at initial evaluation:  strength of LEs grossly 5/5 all muscle groups except bilateral hip flexors (4+/5 right, 4/5 left), bilateral hip IR (4+/5 bilaterally), and right knee flexors (4/5)  Status last progress note (9/12/17): met for all except hip flexors: 4+/5 bilaterally  Current status: not reassessed                             4) Decrease pain in right LE to 3/10 at worst during ADLs in order to allow pt to normalize ADLs. Status at initial evaluation: pain 5/10 at worst  Status last progress note (9/12/17): met (pain 2-3/10 at worst)  Current status: not reassessed                            5) Abolish trigger points in bilateral buttocks in order to decrease pain to 3/10 at worst during sitting, rising, standing, and walking. Status at initial evaluation:  trigger points in the bilateral buttocks/piriformis muscles  Status last progress note (9/12/17): progressing (trigger points reduced 50% since [facility])  Current status: not reassessed      Long Term Goals (To be accomplished in 6 weeks)                        1) Pt will demonstrate ability to climb up and down 42 steps with reciprocal gait pattern with 1 hand rail in order to mobilize independently and safely.   Status at initial evaluation: not assessed  Status last progress note (8/15/17): met (mild increase of right hip (0.5/10 increase))                            6) Pt will be able to stand up to 8 hours at work in order to return to work duties. Status at initial evaluation: not assessed  Status last progress note (8/15/17): met (pt reports standing/walking makes pain better)                            3) Decrease pain in right LE to 1/10 at worst during ADLs in order to return to normal activities. Status at initial evaluation: pain 5/10 at worst  Status last progress note (9/12/17): no new changes since last progress note (pain 4/10 at worst)  Current status: not reassessed                            4) Increase strength of bilateral LEs to 5/5 without increased pain during resisted motions to allow pt ambulate, push, and pull without increased pain. Status at initial evaluation:  strength of LEs grossly 5/5 all muscle groups except bilateral hip flexors (4+/5 right, 4/5 left), bilateral hip IR (4+/5 bilaterally), and right knee flexors (4/5)  Status last progress note (9/12/17): met for all except bilateral hip flexors (4+/5 each)  Current status: not reassessed                            5) Decrease tenderness to palpation of bilateral greater trochanters to mild in order to allow pt to stand > 30 minutes and sleep undisturbed for at least 6 hours.   Status at initial evaluation: moderate-significant tenderness to palpation bilateral greater trochanters  Status last progress note (9/12/17): met (mild tenderness to palpation right greater trochanter)  Current status: met (mild tenderness to palpation right greater trochanter) 9/20/17    PLAN  [x]  Upgrade activities as tolerated     [x]  Continue plan of care  []  Update interventions per flow sheet       []  Discharge due to:_  []  Other:_      Aloma Kanner, PT 9/20/2017  10:37 AM    Future Appointments  Date Time Provider Chris Liz   9/22/2017 11:00 AM Aloma Kanner, PT [facility]

## 2017-09-22 ENCOUNTER — HOSPITAL ENCOUNTER (OUTPATIENT)
Dept: PHYSICAL THERAPY | Age: 47
Discharge: HOME | End: 2017-09-22
Payer: OTHER GOVERNMENT

## 2017-09-22 PROCEDURE — 97110 THERAPEUTIC EXERCISES: CPT

## 2017-09-22 PROCEDURE — 97112 NEUROMUSCULAR REEDUCATION: CPT

## 2017-09-22 PROCEDURE — 97140 MANUAL THERAPY 1/> REGIONS: CPT

## 2017-09-22 NOTE — PROGRESS NOTES
PT DAILY TREATMENT NOTE     Patient Name: Theodora Garcia  Date:2017  : 1970  [x]  Patient  Verified  Payor: [employer] / Plan: [employer] [facility] AND DEPENDENTS / Product Type: Rozena Hals /    In time:11:04  Out time:12:15  Total Treatment Time (min): 71  Visit #: 18 of 24    Treatment Area: Pain in right hip [M25.551]    SUBJECTIVE  Pain Level (0-10 scale): 2.5/10  Any medication changes, allergies to medications, adverse drug reactions, diagnosis change, or new procedure performed?: [x] No    [] Yes (see summary sheet for update)  Subjective functional status/changes:   [] No changes reported  \"My back is hurting today for some reason. \"    OBJECTIVE    Modality rationale:    Min Type Additional Details    [] Estim:  []Unatt       []IFC  []Premod                        []Other:  []w/ice   []w/heat  Position:  Location:    [] Estim: []Att    []TENS instruct  []NMES                    []Other:  []w/US   []w/ice   []w/heat  Position:  Location:    []  Traction: [] Cervical       []Lumbar                       [] Prone          []Supine                       []Intermittent   []Continuous Lbs:  [] before manual  [] after manual    []  Ultrasound: []Continuous   [] Pulsed                           []1MHz   []3MHz W/cm2:  Location:    []  Iontophoresis with dexamethasone         Location: [] Take home patch   [] In clinic    []  Ice     []  heat  []  Ice massage  []  Laser   []  Anodyne Position:  Location:    []  Laser with stim  []  Other:  Position:  Location:    []  Vasopneumatic Device Pressure:       [] lo [] med [] hi   Temperature: [] lo [] med [] hi   [] Skin assessment post-treatment:  []intact []redness- no adverse reaction    []redness - adverse reaction:      min []Eval                  []Re-Eval       23 min Therapeutic Exercise:  [x] See flow sheet :   Rationale: increase ROM to improve the patients ability to tolerate sitting activities.      min Therapeutic Activity:  []  See flow sheet :        38 min Neuromuscular Re-education:  [x]  See flow sheet :   Rationale: increase strength, improve coordination and increase proprioception  to improve the patients ability to tolerate positions and ADLs. 10 min Manual Therapy:    METs to correct left innominate upslip   Rationale: decrease pain, increase ROM, increase tissue extensibility and decrease trigger points to tolerate positions and driving.     min Gait Training:  ___ feet with ___ device on level surfaces with ___ level of assist   Rationale: With   [] TE   [] TA   [] neuro   [] other: Patient Education: [x] Review HEP    [] Progressed/Changed HEP based on:   [] positioning   [] body mechanics   [] transfers   [] heat/ice application    [] other:      Other Objective/Functional Measures:   left LE shorter in supine and sitting- left innominate upslip     Pain Level (0-10 scale) post treatment: 1/10 back, 2.5/10 hip    ASSESSMENT/Changes in Function:    Left innominate upslip successfully corrected today resulting in significant reduction of back pain today. Hip pain remaining mild during all activities.     Patient will continue to benefit from skilled PT services to modify and progress therapeutic interventions, address functional mobility deficits, address ROM deficits, address strength deficits, analyze and address soft tissue restrictions, analyze and cue movement patterns, analyze and modify body mechanics/ergonomics, assess and modify postural abnormalities and instruct in home and community integration to attain remaining goals.      []  See Plan of Care  []  See progress note/recertification  []  See Discharge Summary      Progress towards goals / Updated goals:  Short Term Goals (To be accomplished in 3 weeks)                        1) Pt will be independent and compliant with HEP to achieve other goals. Status at initial evaluation: Pt is not independent with exercises.   Status last progress note (9/12/17): met (pt is 100% independent with exercises)  Current status: not reassessed                            2) Innominates will remain aligned and stable in order to decrease pain and improve pt's ability to ambulate and climb stairs without increased pain. Status at initial evaluation: right innominate upslip (successfully corrected on 7/18/17)  Status last progress note (9/12/17): not met (posteriorly rotated right innominate corrected on 9/12/17)  Current status: not met (left innominate upslip corrected on 9/22/17)                            3) Increase strength of bilateral LEs by 1/3 grade in order to allow pt to transfer sit to stand independently and normalize ADLs. Status at initial evaluation:  strength of LEs grossly 5/5 all muscle groups except bilateral hip flexors (4+/5 right, 4/5 left), bilateral hip IR (4+/5 bilaterally), and right knee flexors (4/5)  Status last progress note (9/12/17): met for all except hip flexors: 4+/5 bilaterally  Current status: not reassessed                             4) Decrease pain in right LE to 3/10 at worst during ADLs in order to allow pt to normalize ADLs. Status at initial evaluation: pain 5/10 at worst  Status last progress note (9/12/17): met (pain 2-3/10 at worst)  Current status: not reassessed                            5) Abolish trigger points in bilateral buttocks in order to decrease pain to 3/10 at worst during sitting, rising, standing, and walking. Status at initial evaluation:  trigger points in the bilateral buttocks/piriformis muscles  Status last progress note (9/12/17): progressing (trigger points reduced 50% since [facility])  Current status: not reassessed      Long Term Goals (To be accomplished in 6 weeks)                        1) Pt will demonstrate ability to climb up and down 42 steps with reciprocal gait pattern with 1 hand rail in order to mobilize independently and safely.   Status at initial evaluation: not assessed  Status last progress note (8/15/17): met (mild increase of right hip (0.5/10 increase))                            2) Pt will be able to stand up to 8 hours at work in order to return to work duties. Status at initial evaluation: not assessed  Status last progress note (8/15/17): met (pt reports standing/walking makes pain better)                            3) Decrease pain in right LE to 1/10 at worst during ADLs in order to return to normal activities. Status at initial evaluation: pain 5/10 at worst  Status last progress note (9/12/17): no new changes since last progress note (pain 4/10 at worst)  Current status: not reassessed                            4) Increase strength of bilateral LEs to 5/5 without increased pain during resisted motions to allow pt ambulate, push, and pull without increased pain. Status at initial evaluation:  strength of LEs grossly 5/5 all muscle groups except bilateral hip flexors (4+/5 right, 4/5 left), bilateral hip IR (4+/5 bilaterally), and right knee flexors (4/5)  Status last progress note (9/12/17): met for all except bilateral hip flexors (4+/5 each)  Current status: not reassessed                            5) Decrease tenderness to palpation of bilateral greater trochanters to mild in order to allow pt to stand > 30 minutes and sleep undisturbed for at least 6 hours.   Status at initial evaluation: moderate-significant tenderness to palpation bilateral greater trochanters  Status last progress note (9/12/17): met (mild tenderness to palpation right greater trochanter)  Current status: met (mild tenderness to palpation right greater trochanter) 9/20/17    PLAN  []  Upgrade activities as tolerated     [x]  Continue plan of care  []  Update interventions per flow sheet       []  Discharge due to:_  []  Other:_      Gema Sparks PT 9/22/2017  3:12 PM    Future Appointments  Date Time Provider Chris Liz   9/25/2017 10:00 AM Jennie Louis V PT [facility]   9/27/2017 1:00 PM TALITA Luque [facility]

## 2017-09-25 ENCOUNTER — APPOINTMENT (OUTPATIENT)
Dept: PHYSICAL THERAPY | Age: 47
End: 2017-09-25
Payer: OTHER GOVERNMENT

## 2017-09-27 ENCOUNTER — HOSPITAL ENCOUNTER (OUTPATIENT)
Dept: PHYSICAL THERAPY | Age: 47
Discharge: HOME | End: 2017-09-27
Payer: OTHER GOVERNMENT

## 2017-09-27 PROCEDURE — 97112 NEUROMUSCULAR REEDUCATION: CPT

## 2017-09-27 PROCEDURE — 97110 THERAPEUTIC EXERCISES: CPT

## 2017-09-27 NOTE — PROGRESS NOTES
PT DAILY TREATMENT NOTE     Patient Name: Justyn Christopher  Date:2017  : 1970  [x]  Patient  Verified  Payor:  / Plan: [employer] [facility] [address] AND DEPENDENTS / Product Type:  /    In time:12:59  Out time:2:06  Total Treatment Time (min): 79  Visit #: 19 of 24    Treatment Area: Pain in right hip [M25.551]    SUBJECTIVE  Pain Level (0-10 scale): 1/10 hip, 0/10 back  Any medication changes, allergies to medications, adverse drug reactions, diagnosis change, or new procedure performed?: [x] No    [] Yes (see summary sheet for update)  Subjective functional status/changes:   [] No changes reported  \"I walked all day at [facility] and had no pain what so ever. \"    OBJECTIVE    Modality rationale:    Min Type Additional Details    [] Estim:  []Unatt       []IFC  []Premod                        []Other:  []w/ice   []w/heat  Position:  Location:    [] Estim: []Att    []TENS instruct  []NMES                    []Other:  []w/US   []w/ice   []w/heat  Position:  Location:    []  Traction: [] Cervical       []Lumbar                       [] Prone          []Supine                       []Intermittent   []Continuous Lbs:  [] before manual  [] after manual    []  Ultrasound: []Continuous   [] Pulsed                           []1MHz   []3MHz W/cm2:  Location:    []  Iontophoresis with dexamethasone         Location: [] Take home patch   [] In clinic    []  Ice     []  heat  []  Ice massage  []  Laser   []  Anodyne Position:  Location:    []  Laser with stim  []  Other:  Position:  Location:    []  Vasopneumatic Device Pressure:       [] lo [] med [] hi   Temperature: [] lo [] med [] hi   [] Skin assessment post-treatment:  []intact []redness- no adverse reaction    []redness - adverse reaction:      min []Eval                  []Re-Eval       8 min Therapeutic Exercise:  [x] See flow sheet :   Rationale: increase ROM to improve the patients ability to tolerate positions and normalize ADLs.     min Therapeutic Activity:  []  See flow sheet :        59 min Neuromuscular Re-education:  [x]  See flow sheet :  Added inch worms   Rationale: increase strength, improve coordination and increase proprioception  to improve the patients ability to tolerate positions and ADLs. min Manual Therapy:          min Gait Training:  ___ feet with ___ device on level surfaces with ___ level of assist   Rationale: With   [] TE   [] TA   [] neuro   [] other: Patient Education: [x] Review HEP    [] Progressed/Changed HEP based on:   [] positioning   [] body mechanics   [] transfers   [] heat/ice application    [] other:      Other Objective/Functional Measures:   Innominates aligned. Pain Level (0-10 scale) post treatment: 0/10    ASSESSMENT/Changes in Function:    Pt's pain has significantly decreased since last treatment when innominate was corrected. Pt reported walking for several hors at [facility] without any pain all day. 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 Patient will continue to benefit from skilled PT services to modify and progress therapeutic interventions, address functional mobility deficits, address ROM deficits, address strength deficits, analyze and address soft tissue restrictions, analyze and cue movement patterns, analyze and modify body mechanics/ergonomics, assess and modify postural abnormalities and instruct in home and community integration to attain remaining goals.      []  See Plan of Care  []  See progress note/recertification  []  See Discharge Summary      Progress towards goals / Updated goals:  Short Term Goals (To be accomplished in 3 weeks)                        1) Pt will be independent and compliant with HEP to achieve other goals. Status at initial evaluation: Pt is not independent with exercises.   Status last progress note (9/12/17): met (pt is 100% independent with exercises)  Current status: not reassessed                            7) Innominates will remain aligned and stable in order to decrease pain and improve pt's ability to ambulate and climb stairs without increased pain. Status at initial evaluation: right innominate upslip (successfully corrected on 7/18/17)  Status last progress note (9/12/17): not met (posteriorly rotated right innominate corrected on 9/12/17)  Current status: met (left innominate upslip corrected on 9/22/17) 9/27/17                            3) Increase strength of bilateral LEs by 1/3 grade in order to allow pt to transfer sit to stand independently and normalize ADLs. Status at initial evaluation:  strength of LEs grossly 5/5 all muscle groups except bilateral hip flexors (4+/5 right, 4/5 left), bilateral hip IR (4+/5 bilaterally), and right knee flexors (4/5)  Status last progress note (9/12/17): met for all except hip flexors: 4+/5 bilaterally  Current status: not reassessed                             4) Decrease pain in right LE to 3/10 at worst during ADLs in order to allow pt to normalize ADLs. Status at initial evaluation: pain 5/10 at worst  Status last progress note (9/12/17): met (pain 2-3/10 at worst)  Current status: not reassessed                            5) Abolish trigger points in bilateral buttocks in order to decrease pain to 3/10 at worst during sitting, rising, standing, and walking. Status at initial evaluation:  trigger points in the bilateral buttocks/piriformis muscles  Status last progress note (9/12/17): progressing (trigger points reduced 50% since [facility])  Current status: not reassessed      Long Term Goals (To be accomplished in 6 weeks)                        1) Pt will demonstrate ability to climb up and down 42 steps with reciprocal gait pattern with 1 hand rail in order to mobilize independently and safely.   Status at initial evaluation: not assessed  Status last progress note (8/15/17): met (mild increase of right hip (0.5/10 increase))                            2) Pt will be able to stand up to 8 hours at work in order to return to work duties. Status at initial evaluation: not assessed  Status last progress note (8/15/17): met (pt reports standing/walking makes pain better)                            3) Decrease pain in right LE to 1/10 at worst during ADLs in order to return to normal activities. Status at initial evaluation: pain 5/10 at worst  Status last progress note (9/12/17): no new changes since last progress note (pain 4/10 at worst)  Current status: not reassessed                            4) Increase strength of bilateral LEs to 5/5 without increased pain during resisted motions to allow pt ambulate, push, and pull without increased pain. Status at initial evaluation:  strength of LEs grossly 5/5 all muscle groups except bilateral hip flexors (4+/5 right, 4/5 left), bilateral hip IR (4+/5 bilaterally), and right knee flexors (4/5)  Status last progress note (9/12/17): met for all except bilateral hip flexors (4+/5 each)  Current status: not reassessed                            5) Decrease tenderness to palpation of bilateral greater trochanters to mild in order to allow pt to stand > 30 minutes and sleep undisturbed for at least 6 hours. Status at initial evaluation: moderate-significant tenderness to palpation bilateral greater trochanters  Status last progress note (9/12/17): met (mild tenderness to palpation right greater trochanter)  Current status: met (mild tenderness to palpation right greater trochanter) 9/20/17     PLAN  []  Upgrade activities as tolerated     []  Continue plan of care  []  Update interventions per flow sheet       []  Discharge due to:_  []  Other:_      Amilcar Haney V, PT 9/27/2017  1:08 PM    No future appointments.

## 2017-10-03 ENCOUNTER — HOSPITAL ENCOUNTER (OUTPATIENT)
Dept: PHYSICAL THERAPY | Age: 47
Discharge: HOME | End: 2017-10-03
Payer: OTHER GOVERNMENT

## 2017-10-03 PROCEDURE — 97112 NEUROMUSCULAR REEDUCATION: CPT

## 2017-10-03 NOTE — PROGRESS NOTES
PT DAILY TREATMENT NOTE     Patient Name: Honorio Gonzales  Date:10/3/2017  : 1970  [x]  Patient  Verified  Payor:  / Plan: [facility]  ACTIVE DUTY AND DEPENDENTS / Product Type:  /    In time:2:06  Out time:3:14  Total Treatment Time (min): 79  Visit #: 20 of 24    Treatment Area: Pain in right hip [M25.551]    SUBJECTIVE  Pain Level (0-10 scale): 2/10  Any medication changes, allergies to medications, adverse drug reactions, diagnosis change, or new procedure performed?: [x] No    [] Yes (see summary sheet for update)  Subjective functional status/changes:   [x] No changes reported    OBJECTIVE    Modality rationale:    Min Type Additional Details    [] Estim:  []Unatt       []IFC  []Premod                        []Other:  []w/ice   []w/heat  Position:  Location:    [] Estim: []Att    []TENS instruct  []NMES                    []Other:  []w/US   []w/ice   []w/heat  Position:  Location:    []  Traction: [] Cervical       []Lumbar                       [] Prone          []Supine                       []Intermittent   []Continuous Lbs:  [] before manual  [] after manual    []  Ultrasound: []Continuous   [] Pulsed                           []1MHz   []3MHz W/cm2:  Location:    []  Iontophoresis with dexamethasone         Location: [] Take home patch   [] In clinic    []  Ice     []  heat  []  Ice massage  []  Laser   []  Anodyne Position:  Location:    []  Laser with stim  []  Other:  Position:  Location:    []  Vasopneumatic Device Pressure:       [] lo [] med [] hi   Temperature: [] lo [] med [] hi   [] Skin assessment post-treatment:  []intact []redness- no adverse reaction    []redness - adverse reaction:      min []Eval                  []Re-Eval       6 min Therapeutic Exercise:  [x] See flow sheet :   Rationale: increase ROM to improve the patients ability to tolerate positions and normalize ADLs.      min Therapeutic Activity:  []  See flow sheet :        61 min Neuromuscular Re-education:  [x]  See flow sheet :   Rationale: increase strength, improve coordination and increase proprioception  to improve the patients ability to tolerate positions and ADLs.       min Manual Therapy:          min Gait Training:  ___ feet with ___ device on level surfaces with ___ level of assist   Rationale: With   [] TE   [] TA   [] neuro   [] other: Patient Education: [x] Review HEP    [] Progressed/Changed HEP based on:   [] positioning   [] body mechanics   [] transfers   [] heat/ice application    [] other:      Other Objective/Functional Measures:   No objective measures taken today. Pain Level (0-10 scale) post treatment: 2/10    ASSESSMENT/Changes in Function:    Pt reports that pain is remaining mild or abolished during non-work ADLs. Patient will continue to benefit from skilled PT services to modify and progress therapeutic interventions, address functional mobility deficits, address ROM deficits, address strength deficits, analyze and address soft tissue restrictions, analyze and cue movement patterns, analyze and modify body mechanics/ergonomics, assess and modify postural abnormalities and instruct in home and community integration to attain remaining goals.      []  See Plan of Care  []  See progress note/recertification  []  See Discharge Summary      Progress towards goals / Updated goals:  Short Term Goals (To be accomplished in 3 weeks)                        1) Pt will be independent and compliant with HEP to achieve other goals. Status at initial evaluation: Pt is not independent with exercises. Status last progress note (9/12/17): met (pt is 100% independent with exercises)                            8) Innominates will remain aligned and stable in order to decrease pain and improve pt's ability to ambulate and climb stairs without increased pain.   Status at initial evaluation: right innominate upslip (successfully corrected on 7/18/17)  Status last progress note (9/12/17): not met (posteriorly rotated right innominate corrected on 9/12/17)  Current status: met (left innominate upslip corrected on 9/22/17) 9/27/17                            3) Increase strength of bilateral LEs by 1/3 grade in order to allow pt to transfer sit to stand independently and normalize ADLs. Status at initial evaluation:  strength of LEs grossly 5/5 all muscle groups except bilateral hip flexors (4+/5 right, 4/5 left), bilateral hip IR (4+/5 bilaterally), and right knee flexors (4/5)  Status last progress note (9/12/17): met for all except hip flexors: 4+/5 bilaterally  Current status: not reassessed                             4) Decrease pain in right LE to 3/10 at worst during ADLs in order to allow pt to normalize ADLs. Status at initial evaluation: pain 5/10 at worst  Status last progress note (9/12/17): met (pain 2-3/10 at worst)  Current status: not reassessed                            5) Abolish trigger points in bilateral buttocks in order to decrease pain to 3/10 at worst during sitting, rising, standing, and walking. Status at initial evaluation:  trigger points in the bilateral buttocks/piriformis muscles  Status last progress note (9/12/17): progressing (trigger points reduced 50% since [facility])  Current status: not reassessed      Long Term Goals (To be accomplished in 6 weeks)                        1) Pt will demonstrate ability to climb up and down 42 steps with reciprocal gait pattern with 1 hand rail in order to mobilize independently and safely. Status at initial evaluation: not assessed  Status last progress note (8/15/17): met (mild increase of right hip (0.5/10 increase))                            2) Pt will be able to stand up to 8 hours at work in order to return to work duties.   Status at initial evaluation: not assessed  Status last progress note (8/15/17): met (pt reports standing/walking makes pain better)                            3) Decrease pain in right LE to 1/10 at worst during ADLs in order to return to normal activities. Status at initial evaluation: pain 5/10 at worst  Status last progress note (9/12/17): no new changes since last progress note (pain 4/10 at worst)  Current status: not reassessed                            4) Increase strength of bilateral LEs to 5/5 without increased pain during resisted motions to allow pt ambulate, push, and pull without increased pain. Status at initial evaluation:  strength of LEs grossly 5/5 all muscle groups except bilateral hip flexors (4+/5 right, 4/5 left), bilateral hip IR (4+/5 bilaterally), and right knee flexors (4/5)  Status last progress note (9/12/17): met for all except bilateral hip flexors (4+/5 each)  Current status: not reassessed                            5) Decrease tenderness to palpation of bilateral greater trochanters to mild in order to allow pt to stand > 30 minutes and sleep undisturbed for at least 6 hours.   Status at initial evaluation: moderate-significant tenderness to palpation bilateral greater trochanters  Status last progress note (9/12/17): met (mild tenderness to palpation right greater trochanter)  Current status: met (mild tenderness to palpation right greater trochanter) 9/20/17       PLAN  []  Upgrade activities as tolerated     []  Continue plan of care  []  Update interventions per flow sheet       []  Discharge due to:_  []  Other:_      Margi Body, PT 10/3/2017  7:00 PM    Future Appointments  Date Time Provider Chris Liz   10/4/2017 4:00 PM Vera Lanier [facility]   10/10/2017 9:00 AM Batsheva Gotti V PT [facility]   10/11/2017 10:00 AM Margi Kaur PT [facility]

## 2017-10-04 ENCOUNTER — HOSPITAL ENCOUNTER (OUTPATIENT)
Dept: PHYSICAL THERAPY | Age: 47
Discharge: HOME | End: 2017-10-04
Payer: OTHER GOVERNMENT

## 2017-10-04 PROCEDURE — 97110 THERAPEUTIC EXERCISES: CPT

## 2017-10-04 PROCEDURE — 97112 NEUROMUSCULAR REEDUCATION: CPT

## 2017-10-05 NOTE — PROGRESS NOTES
PT DAILY TREATMENT NOTE     Patient Name: Gómez Paz  Date:10/4/2017  : 1970  [x]  Patient  Verified  Payor:  / Plan: [facility]  ACTIVE DUTY AND DEPENDENTS / Product Type: Izabella Smart /    In time:410  Out time:515  Total Treatment Time (min): 72  Visit #:  of 24    Treatment Area: Pain in right hip [M25.551]    SUBJECTIVE  Pain Level (0-10 scale): 2/10  Any medication changes, allergies to medications, adverse drug reactions, diagnosis change, or new procedure performed?: [x] No    [] Yes (see summary sheet for update)  Subjective functional status/changes:   [x] No changes reported  \"I had a lot of pain yesterday and this morning. Really feeling off and out of alignment. \"    OBJECTIVE    Modality rationale:    Min Type Additional Details    [] Estim:  []Unatt       []IFC  []Premod                        []Other:  []w/ice   []w/heat  Position:  Location:    [] Estim: []Att    []TENS instruct  []NMES                    []Other:  []w/US   []w/ice   []w/heat  Position:  Location:    []  Traction: [] Cervical       []Lumbar                       [] Prone          []Supine                       []Intermittent   []Continuous Lbs:  [] before manual  [] after manual    []  Ultrasound: []Continuous   [] Pulsed                           []1MHz   []3MHz W/cm2:  Location:    []  Iontophoresis with dexamethasone         Location: [] Take home patch   [] In clinic   10 []  Ice     [x]  heat  []  Ice massage  []  Laser   []  Anodyne Position:90/90  Location:right buttock    []  Laser with stim  []  Other:  Position:  Location:    []  Vasopneumatic Device Pressure:       [] lo [] med [] hi   Temperature: [] lo [] med [] hi   [] Skin assessment post-treatment:  []intact []redness- no adverse reaction    []redness - adverse reaction:      min []Eval                  []Re-Eval       35 min Therapeutic Exercise:  [x] See flow sheet :   Rationale: increase ROM to improve the patients ability to tolerate positions and normalize ADLs. min Therapeutic Activity:  []  See flow sheet :        20 min Neuromuscular: JOANIE for repositioning, gentle right hip distraction/longitudinal,    Rationale: increase strength, improve coordination and increase proprioception  to improve the patients ability to tolerate positions and ADLs.       min Manual Therapy:          min Gait Training:  ___ feet with ___ device on level surfaces with ___ level of assist   Rationale: With   [] TE   [] TA   [] neuro   [] other: Patient Education: [x] Review HEP    [] Progressed/Changed HEP based on:   [] positioning   [] body mechanics   [] transfers   [] heat/ice application    [] other:      Other Objective/Functional Measures:   Alignment resolved post repositioning  No pain/discomfort with ambulation  TTP right buttock and lateral hip    Pain Level (0-10 scale) post treatment: 0/10    ASSESSMENT/Changes in Function:   Pain continues to fluctuate    Patient will continue to benefit from skilled PT services to modify and progress therapeutic interventions, address functional mobility deficits, address ROM deficits, address strength deficits, analyze and address soft tissue restrictions, analyze and cue movement patterns, analyze and modify body mechanics/ergonomics, assess and modify postural abnormalities and instruct in home and community integration to attain remaining goals.      [x]  See Plan of Care  []  See progress note/recertification  []  See Discharge Summary      Progress towards goals / Updated goals:  Short Term Goals (To be accomplished in 3 weeks)                        1) Pt will be independent and compliant with HEP to achieve other goals. Status at initial evaluation: Pt is not independent with exercises.   Status last progress note (9/12/17): met (pt is 100% independent with exercises)                            2) Innominates will remain aligned and stable in order to decrease pain and improve pt's ability to ambulate and climb stairs without increased pain. Status at initial evaluation: right innominate upslip (successfully corrected on 7/18/17)  Status last progress note (9/12/17): not met (posteriorly rotated right innominate corrected on 9/12/17)  Current status: met (left innominate upslip corrected on 9/22/17) 9/27/17                            3) Increase strength of bilateral LEs by 1/3 grade in order to allow pt to transfer sit to stand independently and normalize ADLs. Status at initial evaluation:  strength of LEs grossly 5/5 all muscle groups except bilateral hip flexors (4+/5 right, 4/5 left), bilateral hip IR (4+/5 bilaterally), and right knee flexors (4/5)  Status last progress note (9/12/17): met for all except hip flexors: 4+/5 bilaterally  Current status: not reassessed                             4) Decrease pain in right LE to 3/10 at worst during ADLs in order to allow pt to normalize ADLs. Status at initial evaluation: pain 5/10 at worst  Status last progress note (9/12/17): met (pain 2-3/10 at worst)  Current status: not reassessed                            5) Abolish trigger points in bilateral buttocks in order to decrease pain to 3/10 at worst during sitting, rising, standing, and walking. Status at initial evaluation:  trigger points in the bilateral buttocks/piriformis muscles  Status last progress note (9/12/17): progressing (trigger points reduced 50% since [facility])  Current status: not reassessed      Long Term Goals (To be accomplished in 6 weeks)                        1) Pt will demonstrate ability to climb up and down 42 steps with reciprocal gait pattern with 1 hand rail in order to mobilize independently and safely. Status at initial evaluation: not assessed  Status last progress note (8/15/17): met (mild increase of right hip (0.5/10 increase))                            2) Pt will be able to stand up to 8 hours at work in order to return to work duties.   Status at initial evaluation: not assessed  Status last progress note (8/15/17): met (pt reports standing/walking makes pain better)                            3) Decrease pain in right LE to 1/10 at worst during ADLs in order to return to normal activities. Status at initial evaluation: pain 5/10 at worst  Status last progress note (9/12/17): no new changes since last progress note (pain 4/10 at worst)  Current status: not reassessed                            4) Increase strength of bilateral LEs to 5/5 without increased pain during resisted motions to allow pt ambulate, push, and pull without increased pain. Status at initial evaluation:  strength of LEs grossly 5/5 all muscle groups except bilateral hip flexors (4+/5 right, 4/5 left), bilateral hip IR (4+/5 bilaterally), and right knee flexors (4/5)  Status last progress note (9/12/17): met for all except bilateral hip flexors (4+/5 each)  Current status: not reassessed                            5) Decrease tenderness to palpation of bilateral greater trochanters to mild in order to allow pt to stand > 30 minutes and sleep undisturbed for at least 6 hours.   Status at initial evaluation: moderate-significant tenderness to palpation bilateral greater trochanters  Status last progress note (9/12/17): met (mild tenderness to palpation right greater trochanter)  Current status: met (mild tenderness to palpation right greater trochanter) 9/20/17       PLAN  []  Upgrade activities as tolerated     []  Continue plan of care  []  Update interventions per flow sheet       []  Discharge due to:_  []  Other:_      Calli Fam, PT 10/4/2017  7:00 PM    Future Appointments  Date Time Provider Chris Liz   10/10/2017 9:00 AM Lg Cortes V PT [facility]   10/11/2017 10:00 AM Fannie Zhu, PT [facility]

## 2017-10-10 ENCOUNTER — HOSPITAL ENCOUNTER (OUTPATIENT)
Dept: PHYSICAL THERAPY | Age: 47
Discharge: HOME | End: 2017-10-10
Payer: OTHER GOVERNMENT

## 2017-10-10 PROCEDURE — 97112 NEUROMUSCULAR REEDUCATION: CPT

## 2017-10-10 PROCEDURE — 97110 THERAPEUTIC EXERCISES: CPT

## 2017-10-10 NOTE — PROGRESS NOTES
PT DAILY TREATMENT NOTE     Patient Name: Xuan Nguyen  Date:10/10/2017  : 1970  [x]  Patient  Verified  Payor:  / Plan: [facility]  ACTIVE DUTY AND DEPENDENTS / Product Type: Ana Elder /    In time:9:05  Out time:10:13  Total Treatment Time (min): 76  Visit #:  of 24    Treatment Area: Pain in right hip [M25.551]    SUBJECTIVE  Pain Level (0-10 scale): 0.5/10  Any medication changes, allergies to medications, adverse drug reactions, diagnosis change, or new procedure performed?: [x] No    [] Yes (see summary sheet for update)  Subjective functional status/changes:   [] No changes reported  \"I was sore for 2 days after last treatment. I walked 10 miles this weekend and didn't have much pain. I'm doing 80-85% of my normal activity level. \"    OBJECTIVE    Modality rationale: decrease pain to improve the patients ability to tolerate positions and ADLs.    Min Type Additional Details    [] Estim:  []Unatt       []IFC  []Premod                        []Other:  []w/ice   []w/heat  Position:  Location:    [] Estim: []Att    []TENS instruct  []NMES                    []Other:  []w/US   []w/ice   []w/heat  Position:  Location:    []  Traction: [] Cervical       []Lumbar                       [] Prone          []Supine                       []Intermittent   []Continuous Lbs:  [] before manual  [] after manual    []  Ultrasound: []Continuous   [] Pulsed                           []1MHz   []3MHz W/cm2:  Location:    []  Iontophoresis with dexamethasone         Location: [] Take home patch   [] In clinic   10 []  Ice     [x]  heat  []  Ice massage  []  Laser   []  Anodyne Position: left side lying  Location: applied to right hip    []  Laser with stim  []  Other:  Position:  Location:    []  Vasopneumatic Device Pressure:       [] lo [] med [] hi   Temperature: [] lo [] med [] hi   [] Skin assessment post-treatment:  []intact []redness- no adverse reaction    []redness - adverse reaction:      min []Eval                  []Re-Eval       23 min Therapeutic Exercise:  [x] See flow sheet :   Rationale: increase ROM to improve the patients ability to tolerate positions and normalize ADLs. min Therapeutic Activity:  []  See flow sheet :        35 min Neuromuscular Re-education:  [x]  See flow sheet :   Rationale: increase strength, improve coordination and increase proprioception  to improve the patients ability to tolerate positions and ADLs. min Manual Therapy:          min Gait Training:  ___ feet with ___ device on level surfaces with ___ level of assist   Rationale: With   [] TE   [] TA   [] neuro   [] other: Patient Education: [x] Review HEP    [] Progressed/Changed HEP based on:   [] positioning   [] body mechanics   [] transfers   [] heat/ice application    [] other:      Other Objective/Functional Measures:   No objective measures taken today. Pain Level (0-10 scale) post treatment: 1/10    ASSESSMENT/Changes in Function:    Pt reports nearly abolished hip pain allowing her to walk 10 miles over the weekend. Function is normalizing. Patient will continue to benefit from skilled PT services to modify and progress therapeutic interventions, address functional mobility deficits, address ROM deficits, address strength deficits, analyze and address soft tissue restrictions, analyze and cue movement patterns, analyze and modify body mechanics/ergonomics, assess and modify postural abnormalities and instruct in home and community integration to attain remaining goals.      [x]  See Plan of Care  []  See progress note/recertification  []  See Discharge Summary      Progress towards goals / Updated goals:  Short Term Goals (To be accomplished in 3 weeks)                        1) Pt will be independent and compliant with HEP to achieve other goals. Status at initial evaluation: Pt is not independent with exercises.   Status last progress note (9/12/17): met (pt is 100% independent with exercises)                            3) Innominates will remain aligned and stable in order to decrease pain and improve pt's ability to ambulate and climb stairs without increased pain. Status at initial evaluation: right innominate upslip (successfully corrected on 7/18/17)  Status last progress note (9/12/17): not met (posteriorly rotated right innominate corrected on 9/12/17)  Current status: met (left innominate upslip corrected on 9/22/17) 9/27/17                            3) Increase strength of bilateral LEs by 1/3 grade in order to allow pt to transfer sit to stand independently and normalize ADLs. Status at initial evaluation:  strength of LEs grossly 5/5 all muscle groups except bilateral hip flexors (4+/5 right, 4/5 left), bilateral hip IR (4+/5 bilaterally), and right knee flexors (4/5)  Status last progress note (9/12/17): met for all except hip flexors: 4+/5 bilaterally  Current status: not reassessed                             4) Decrease pain in right LE to 3/10 at worst during ADLs in order to allow pt to normalize ADLs. Status at initial evaluation: pain 5/10 at worst  Status last progress note (9/12/17): met (pain 2-3/10 at worst)  Current status: not reassessed                            5) Abolish trigger points in bilateral buttocks in order to decrease pain to 3/10 at worst during sitting, rising, standing, and walking. Status at initial evaluation:  trigger points in the bilateral buttocks/piriformis muscles  Status last progress note (9/12/17): progressing (trigger points reduced 50% since [facility])  Current status: not reassessed      Long Term Goals (To be accomplished in 6 weeks)                        1) Pt will demonstrate ability to climb up and down 42 steps with reciprocal gait pattern with 1 hand rail in order to mobilize independently and safely.   Status at initial evaluation: not assessed  Status last progress note (8/15/17): met (mild increase of right hip (0.5/10 increase))                            8) Pt will be able to stand up to 8 hours at work in order to return to work duties. Status at initial evaluation: not assessed  Status last progress note (8/15/17): met (pt reports standing/walking makes pain better)                            3) Decrease pain in right LE to 1/10 at worst during ADLs in order to return to normal activities. Status at initial evaluation: pain 5/10 at worst  Status last progress note (9/12/17): no new changes since last progress note (pain 4/10 at worst)  Current status: not reassessed                            4) Increase strength of bilateral LEs to 5/5 without increased pain during resisted motions to allow pt ambulate, push, and pull without increased pain. Status at initial evaluation:  strength of LEs grossly 5/5 all muscle groups except bilateral hip flexors (4+/5 right, 4/5 left), bilateral hip IR (4+/5 bilaterally), and right knee flexors (4/5)  Status last progress note (9/12/17): met for all except bilateral hip flexors (4+/5 each)  Current status: not reassessed                            5) Decrease tenderness to palpation of bilateral greater trochanters to mild in order to allow pt to stand > 30 minutes and sleep undisturbed for at least 6 hours.   Status at initial evaluation: moderate-significant tenderness to palpation bilateral greater trochanters  Status last progress note (9/12/17): met (mild tenderness to palpation right greater trochanter)  Current status: met (mild tenderness to palpation right greater trochanter) 9/20/17      PLAN  []  Upgrade activities as tolerated     [x]  Continue plan of care  []  Update interventions per flow sheet       []  Discharge due to:_  []  Other:_      Maribel Leiva PT 10/10/2017  9:32 AM    Future Appointments  Date Time Provider Chris Liz   10/11/2017 10:00 AM TALITA Barber [facility]

## 2017-10-11 ENCOUNTER — HOSPITAL ENCOUNTER (OUTPATIENT)
Dept: PHYSICAL THERAPY | Age: 47
Discharge: HOME | End: 2017-10-11
Payer: OTHER GOVERNMENT

## 2017-10-11 PROCEDURE — 97112 NEUROMUSCULAR REEDUCATION: CPT

## 2017-10-11 NOTE — PROGRESS NOTES
PT DAILY TREATMENT NOTE     Patient Name: [facility]  Date:10/11/2017  : 1970  [x]  Patient  Verified  Payor: [facility] [place] / Plan: [employer] [address] AND DEPENDENTS / Product Type: Gladys José /    In time:10:10  Out time:10:48  Total Treatment Time (min): 38  Visit #: 23 of 24    Treatment Area: Pain in right hip [M25.551]    SUBJECTIVE  Pain Level (0-10 scale): 1/10  Any medication changes, allergies to medications, adverse drug reactions, diagnosis change, or new procedure performed?: [x] No    [] Yes (see summary sheet for update)  Subjective functional status/changes:   [x] No changes reported    OBJECTIVE    Modality rationale:    Min Type Additional Details    [] Estim:  []Unatt       []IFC  []Premod                        []Other:  []w/ice   []w/heat  Position:  Location:    [] Estim: []Att    []TENS instruct  []NMES                    []Other:  []w/US   []w/ice   []w/heat  Position:  Location:    []  Traction: [] Cervical       []Lumbar                       [] Prone          []Supine                       []Intermittent   []Continuous Lbs:  [] before manual  [] after manual    []  Ultrasound: []Continuous   [] Pulsed                           []1MHz   []3MHz W/cm2:  Location:    []  Iontophoresis with dexamethasone         Location: [] Take home patch   [] In clinic    []  Ice     []  heat  []  Ice massage  []  Laser   []  Anodyne Position:  Location:    []  Laser with stim  []  Other:  Position:  Location:    []  Vasopneumatic Device Pressure:       [] lo [] med [] hi   Temperature: [] lo [] med [] hi   [] Skin assessment post-treatment:  []intact []redness- no adverse reaction    []redness - adverse reaction:      min []Eval                  []Re-Eval        min Therapeutic Exercise:  [] See flow sheet :         min Therapeutic Activity:  []  See flow sheet :        38 min Neuromuscular Re-education:  [x]  See flow sheet :  Assessed strength and reviewed HEP and discussed progression of HEP after D/C   Rationale: increase ROM, increase strength, improve coordination and increase proprioception  to improve the patients ability to tolerate positions and normalize ADLs. min Manual Therapy:          min Gait Training:  ___ feet with ___ device on level surfaces with ___ level of assist   Rationale: With   [] TE   [] TA   [] neuro   [] other: Patient Education: [x] Review HEP    [] Progressed/Changed HEP based on:   [] positioning   [] body mechanics   [] transfers   [] heat/ice application    [] other:      Other Objective/Functional Measures:   Innominates aligned. Strength of bilateral LE grossly 5/5 all muscle groups. Pain Level (0-10 scale) post treatment: 1/10    ASSESSMENT/Changes in Function:    Pt has met nearly all goals per initial plan of care. Pt is independent and compliant with HEP and motivated to continue with self-monitored HEP after discharge. 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 Pt's innominates have been remaining stable for several days, but occasionally she requires skilled manual therapy to correct left innominate upslips. Pt is able to independently correct rotational malalignments of her innominates. Pt reports returning to over 90% of prior level of function. Pt has been discharged from PT to self-monitored HEP. []  See Plan of Care  []  See progress note/recertification  []  See Discharge Summary         Progress towards goals / Updated goals:  Short Term Goals (To be accomplished in 3 weeks)                        1) Pt will be independent and compliant with HEP to achieve other goals. Status at initial evaluation: Pt is not independent with exercises.   Status last progress note (9/12/17): met (pt is 100% independent with exercises)                            6) Innominates will remain aligned and stable in order to decrease pain and improve pt's ability to ambulate and climb stairs without increased pain. Status at initial evaluation: right innominate upslip (successfully corrected on 7/18/17)  Status last progress note (9/12/17): not met (posteriorly rotated right innominate corrected on 9/12/17)  Current status: met (left innominate upslip corrected on 9/22/17) 9/27/17                            3) Increase strength of bilateral LEs by 1/3 grade in order to allow pt to transfer sit to stand independently and normalize ADLs. Status at initial evaluation:  strength of LEs grossly 5/5 all muscle groups except bilateral hip flexors (4+/5 right, 4/5 left), bilateral hip IR (4+/5 bilaterally), and right knee flexors (4/5)  Status last progress note (9/12/17): met for all except hip flexors: 4+/5 bilaterally  Current status:  met (bilateral LE strength grossly 5/5 all muscle groups) 10/11/17                            4) Decrease pain in right LE to 3/10 at worst during ADLs in order to allow pt to normalize ADLs. Status at initial evaluation: pain 5/10 at worst  Status last progress note (9/12/17): met (pain 2-3/10 at worst)  Current status: met (pain 1/10 at worst) 10/11/17                            5) Abolish trigger points in bilateral buttocks in order to decrease pain to 3/10 at worst during sitting, rising, standing, and walking. Status at initial evaluation:  trigger points in the bilateral buttocks/piriformis muscles  Status last progress note (9/12/17): progressing (trigger points reduced 50% since [facility])  Current status: nearly met (trigger points 90% reduced in size) 10/11/17       Long Term Goals (To be accomplished in 6 weeks)                        1) Pt will demonstrate ability to climb up and down 42 steps with reciprocal gait pattern with 1 hand rail in order to mobilize independently and safely.   Status at initial evaluation: not assessed  Status last progress note (8/15/17): met (mild increase of right hip (0.5/10 increase))                            9) Pt will be able to stand up to 8 hours at work in order to return to work duties. Status at initial evaluation: not assessed  Status last progress note (8/15/17): met (pt reports standing/walking makes pain better)                            3) Decrease pain in right LE to 1/10 at worst during ADLs in order to return to normal activities. Status at initial evaluation: pain 5/10 at worst  Status last progress note (9/12/17): no new changes since last progress note (pain 4/10 at worst)  Current status: met (pain 1/10 at worst) 10/11/17                            4) Increase strength of bilateral LEs to 5/5 without increased pain during resisted motions to allow pt ambulate, push, and pull without increased pain. Status at initial evaluation:  strength of LEs grossly 5/5 all muscle groups except bilateral hip flexors (4+/5 right, 4/5 left), bilateral hip IR (4+/5 bilaterally), and right knee flexors (4/5)  Status last progress note (9/12/17): met for all except bilateral hip flexors (4+/5 each)  Current status: met (bilateral LE strength grossly 5/5 all muscle groups) 10/11/17                            5) Decrease tenderness to palpation of bilateral greater trochanters to mild in order to allow pt to stand > 30 minutes and sleep undisturbed for at least 6 hours. Status at initial evaluation: moderate-significant tenderness to palpation bilateral greater trochanters  Status last progress note (9/12/17): met (mild tenderness to palpation right greater trochanter)  Current status: met (mild tenderness to palpation right greater trochanter)    PLAN  []  Upgrade activities as tolerated     []  Continue plan of care  []  Update interventions per flow sheet       []  Discharge due to:_  []  Other:_      Annia Sheikh, PT 10/11/2017  5:02 PM    No future appointments.

## 2017-10-11 NOTE — PROGRESS NOTES
[facility] [address], [address] [place] [address]  Phone: [phone]      Fax:  [phone]    Discharge Summary      Patient name: Hannah Hernandez     Start of Care: 17  Referral source: Opal Stephens,*    : 1970  Medical/Treatment Diagnosis: Pain in right hip [M25.551]  Onset Date: early 2017  Prior Hospitalization: see medical history    Provider#: [id number]  Comorbidities: CA  Prior Level of Function: no deficits working at [employer] as   Medications: Verified on Patient Summary List  Visits from [facility]: 23     Missed Visits: 2  Reporting Period : 17 to 10/11/17    Short Term Goals (To be accomplished in 3 weeks)                        4) Pt will be independent and compliant with HEP to achieve other goals. Status at initial evaluation: Pt is not independent with exercises. Status last progress note (17): met (pt is 100% independent with exercises)                            7) Innominates will remain aligned and stable in order to decrease pain and improve pt's ability to ambulate and climb stairs without increased pain. Status at initial evaluation: right innominate upslip (successfully corrected on 17)  Status last progress note (17): not met (posteriorly rotated right innominate corrected on 17)  Current status: met (left innominate upslip corrected on 17)                            3) Increase strength of bilateral LEs by 1/3 grade in order to allow pt to transfer sit to stand independently and normalize ADLs.   Status at initial evaluation:  strength of LEs grossly 5/5 all muscle groups except bilateral hip flexors (4+/5 right, 4/5 left), bilateral hip IR (4+/5 bilaterally), and right knee flexors (4/5)  Status last progress note (17): met for all except hip flexors: 4+/5 bilaterally  Current status:  met (bilateral LE strength grossly 5/5 all muscle groups)                            4) Decrease pain in right LE to 3/10 at worst during ADLs in order to allow pt to normalize ADLs. Status at initial evaluation: pain 5/10 at worst  Status last progress note (9/12/17): met (pain 2-3/10 at worst)  Current status: met (pain 1/10 at worst)                            5) Abolish trigger points in bilateral buttocks in order to decrease pain to 3/10 at worst during sitting, rising, standing, and walking. Status at initial evaluation:  trigger points in the bilateral buttocks/piriformis muscles  Status last progress note (9/12/17): progressing (trigger points reduced 50% since [facility])  Current status: nearly met (trigger points 90% reduced in size)       Long Term Goals (To be accomplished in 6 weeks)                        1) Pt will demonstrate ability to climb up and down 42 steps with reciprocal gait pattern with 1 hand rail in order to mobilize independently and safely. Status at initial evaluation: not assessed  Status last progress note (8/15/17): met (mild increase of right hip (0.5/10 increase))                            2) Pt will be able to stand up to 8 hours at work in order to return to work duties. Status at initial evaluation: not assessed  Status last progress note (8/15/17): met (pt reports standing/walking makes pain better)  Current status: met (pt reported walking 10 miles with 1/10 at worst)                            3) Decrease pain in right LE to 1/10 at worst during ADLs in order to return to normal activities. Status at initial evaluation: pain 5/10 at worst  Status last progress note (9/12/17): no new changes since last progress note (pain 4/10 at worst)  Current status: met (pain 1/10 at worst)                            4) Increase strength of bilateral LEs to 5/5 without increased pain during resisted motions to allow pt ambulate, push, and pull without increased pain.   Status at initial evaluation:  strength of LEs grossly 5/5 all muscle groups except bilateral hip flexors (4+/5 right, 4/5 left), bilateral hip IR (4+/5 bilaterally), and right knee flexors (4/5)  Status last progress note (9/12/17): met for all except bilateral hip flexors (4+/5 each)  Current status: met (bilateral LE strength grossly 5/5 all muscle groups)                            5) Decrease tenderness to palpation of bilateral greater trochanters to mild in order to allow pt to stand > 30 minutes and sleep undisturbed for at least 6 hours. Status at initial evaluation: moderate-significant tenderness to palpation bilateral greater trochanters  Status last progress note (9/12/17): met (mild tenderness to palpation right greater trochanter)  Current status: met (mild tenderness to palpation right greater trochanter)    Assessment/ Summary of Care:    Pt has met nearly all goals per initial plan of care. Pt is independent and compliant with HEP and motivated to continue with self-monitored HEP after discharge. 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 Pt's innominates have been remaining stable for several days, but occasionally she requires skilled manual therapy to correct left innominate upslips. Pt is able to independently correct rotational malalignments of her innominates. Pt reports returning to over 90% of prior level of function. Pt has been discharged from PT to self-monitored HEP.     RECOMMENDATIONS:  [x]Discontinue therapy: [x]Patient has reached or is progressing toward set goals      []Patient is non-compliant or has abdicated      []Due to lack of appreciable progress towards set goals    Nick Downs V, PT 10/11/2017 5:19 PM

## 2018-07-04 ENCOUNTER — HOSPITAL ENCOUNTER (EMERGENCY)
Age: 48
Discharge: HOME | End: 2018-07-05
Attending: EMERGENCY MEDICINE
Payer: OTHER GOVERNMENT

## 2018-07-04 VITALS
DIASTOLIC BLOOD PRESSURE: 80 MMHG | OXYGEN SATURATION: 99 % | HEART RATE: 71 BPM | TEMPERATURE: 98 F | RESPIRATION RATE: 12 BRPM | WEIGHT: 183 LBS | HEIGHT: 68 IN | BODY MASS INDEX: 27.74 KG/M2 | SYSTOLIC BLOOD PRESSURE: 133 MMHG

## 2018-07-04 DIAGNOSIS — S93.402A SPRAIN OF LEFT ANKLE, UNSPECIFIED LIGAMENT, INITIAL ENCOUNTER: ICD-10-CM

## 2018-07-04 DIAGNOSIS — S92.532A CLOSED DISPLACED FRACTURE OF DISTAL PHALANX OF LESSER TOE OF LEFT FOOT, INITIAL ENCOUNTER: ICD-10-CM

## 2018-07-04 DIAGNOSIS — S93.602A SPRAIN OF LEFT FOOT, INITIAL ENCOUNTER: ICD-10-CM
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
## 2018-07-04 PROCEDURE — 99284 EMERGENCY DEPT VISIT MOD MDM: CPT

## 2018-07-04 NOTE — LETTER
[facility] [place] 
THE [facility] EMERGENCY DEPT 
[address] [id number] [phone] Work/School Note Date: 7/4/2018 To Whom It May concern: Honorio Gonzales was seen and treated today in the emergency room by the following provider(s): 
Attending Provider: Edenilson Greenfield MD 
Nurse Practitioner: Miranda Admae NP.   
 
Honorio Gonzales may return to work on 7/9/2018. Sincerely, Beau JUARES-BC

## 2018-07-05 ENCOUNTER — APPOINTMENT (OUTPATIENT)
Dept: GENERAL RADIOLOGY | Age: 48
End: 2018-07-05
Attending: NURSE PRACTITIONER
Payer: OTHER GOVERNMENT

## 2018-07-05 PROCEDURE — L1930 AFO PLASTIC: HCPCS

## 2018-07-05 PROCEDURE — 73630 X-RAY EXAM OF FOOT: CPT

## 2018-07-05 PROCEDURE — 73610 X-RAY EXAM OF ANKLE: CPT

## 2018-07-05 PROCEDURE — 77030036687 HC SHOE PSTOP S2SG -A

## 2018-07-05 RX ORDER — IBUPROFEN 600 MG/1
600 TABLET ORAL
Qty: 20 TAB | Refills: 0 | Status: SHIPPED | OUTPATIENT
Start: 2018-07-05 | End: 2019-06-29

## 2018-07-05 NOTE — DISCHARGE INSTRUCTIONS
Broken Toe: Care Instructions  Your Care Instructions  You have broken (fractured) a bone in your toe. This kind of fracture does not need a special cast or brace. \"Kiki-taping\" your broken toe to a healthy toe next to it is almost always enough to treat the problem and ease symptoms. The toe may take 4 weeks or more to heal.  You heal best when you take good care of yourself. Eat a variety of healthy foods, and don't smoke. Follow-up care is a key part of your treatment and safety. Be sure to make and go to all appointments, and call your doctor if you are having problems. It's also a good idea to know your test results and keep a list of the medicines you take. How can you care for yourself at home? · Be safe with medicines. Take pain medicines exactly as directed. ¨ If the doctor gave you a prescription medicine for pain, take it as prescribed. ¨ If you are not taking a prescription pain medicine, ask your doctor if you can take an over-the-counter medicine. · If your toe is taped to the toe next to it, your doctor has shown you how to change the tape. Protect the skin by putting something soft, such as felt or foam, between your toes before you tape them together. Never tape the toes together skin-to-skin. Your broken toe may need to be kiki-taped for 2 to 4 weeks to heal.  · Rest and protect your toe. Do not walk on it until you can do so without too much pain. If the doctor has told you to use crutches, use them as instructed. · Put ice or a cold pack on your toe for 10 to 20 minutes at a time. Try to do this every 1 to 2 hours for the next 3 days (when you are awake) or until the swelling goes down. Put a thin cloth between the ice and your skin. · Prop up your foot on a pillow when you ice it or anytime you sit or lie down. Try to keep it above the level of your heart. This will help reduce swelling. · Make sure you go to your follow-up appointments.  Your doctor will need to check that your toe is healing right. When should you call for help? Call your doctor now or seek immediate medical care if:  ? · You have severe pain. ? · Your toe is cool or pale or changes color. ? · You have tingling, weakness, or numbness in your toe. ? Watch closely for changes in your health, and be sure to contact your doctor if:  ? · Pain and swelling get worse. ? · You are not getting better as expected. Where can you learn more? Go to http://isabel-sofiya.info/. Enter O861 in the search box to learn more about \"Broken Toe: Care Instructions. \"  Current as of: March 21, 2017  Content Version: 11.4  © 3849-1487 [employer], [employer]. Care instructions adapted under license by [employer] (which disclaims liability or warranty for this information). If you have questions about a medical condition or this instruction, always ask your healthcare professional. [address] any warranty or liability for your use of this information.

## 2018-11-29 ENCOUNTER — HOSPITAL ENCOUNTER (OUTPATIENT)
Dept: PHYSICAL THERAPY | Age: 48
Discharge: HOME | End: 2018-11-29
Payer: OTHER GOVERNMENT

## 2018-11-29 PROCEDURE — 97162 PT EVAL MOD COMPLEX 30 MIN: CPT | Performed by: PHYSICAL THERAPIST

## 2018-11-29 PROCEDURE — 97110 THERAPEUTIC EXERCISES: CPT | Performed by: PHYSICAL THERAPIST

## 2018-11-29 NOTE — PROGRESS NOTES
[facility] 
[address] [facility] [address], [address] [facility] [address] [phone]  Fx [phone] Plan of Care/ Statement of Necessity for Physical Therapy Services Patient name: Karen Armando Start of Care: 2018 Referral source: Megha Yang* : 1970 Medical Diagnosis: Right hip pain [M25.551] Onset Date:2018 Treatment Diagnosis: right hip pain Prior Hospitalization: see medical history Provider#: [id number] Medications: Verified on Patient summary List  
 Comorbidities: arthritis , prior hx cancer Prior Level of Function: no limitation The Plan of Care and following information is based on the information from the initial evaluation. Assessment/ key information: Patient is a 50 y. o.female who presents to PT with Right hip pain [M25.551]. Pt with ttp over post PSIS and superior and post right hip greater trochanter, no pain with compression or scour , + weakness in hip RIght > left . Mild tight quad hip flexor and piriformis and only mild tight IT band. Tight bilateral hip ER. The patient will benefit from skilled PT services to address these deficits and facilitate return to premorbid activity level and improved quality of life. Evaluation Complexity History MEDIUM  Complexity : 1-2 comorbidities / personal factors will impact the outcome/ POC ; Examination MEDIUM Complexity : 3 Standardized tests and measures addressing body structure, function, activity limitation and / or participation in recreation  ;Presentation MEDIUM Complexity : Evolving with changing characteristics  ; Clinical Decision Making MEDIUM Complexity : FOTO score of 26-74 Overall Complexity Rating: MEDIUM Problem List: pain affecting function, decrease ROM, decrease strength, impaired gait/ balance, decrease ADL/ functional abilitiies, decrease activity tolerance and decrease flexibility/ joint mobility Treatment Plan may include any combination of the following: Therapeutic exercise, Therapeutic activities, Neuromuscular re-education, Physical agent/modality, Gait/balance training, Manual therapy, Aquatic therapy, Patient education, Self Care training and Functional mobility training Patient / Family readiness to learn indicated by: asking questions, trying to perform skills and interest 
Persons(s) to be included in education: patient (P) Barriers to Learning/Limitations: None Patient Goal (s): less pain  Patient Self Reported Health Status: fair Rehabilitation Potential: good Short Term Goals: To be accomplished in 3 weeks: 1. Pt will be compliant with HEP for max progression toward all goals and return to PLOF. Evalstarted on HEP will give handout next session Current: NA 
  
2. Pt FOTO score will increase by >=10 points to show improvement in functional mobility. Eval:44/100 Current: will address at visit 5 Long Term Goals: To be accomplished in 6 weeks: 1. Pt will be independant with HEP for continued and ongoing progression following discharge toward full functional mobility. Eval:started on HEP Current: NA 
  
2.Pt pain and overall subjective function will improve >= 80% to allow pt return to PLOF. Eval: pain at occurrence high up to 8-9/10 but recent with injection 1-2/10 at most 6/10 , pt reports hesitancy with increase walking standing  due to pain Current: NA 
  
3. Pt FOTO score will increase by >=23 points to show improvement in functional mobility. Eval:44/100 Current: will reassess every 5th visit  
  
4. Pt strength will improve to 5 to allow pt to return to PLOF no limit with walking and work standing bending lifting activities Eval:                 Strength (1-5) Hip Left Right Flexion 5 4 to 4+ Extension      
Abduction 5 5 Adduction 5 5 ER 4- 4 IR 5 4+  
  
Current: NA 
  
5. Pt flexibility  will improve with knee <10 cm from table with emy heel to buttock in prone knee flexion and IT band foot past table level in carlos noting improve mobility without restriction Eval:emy knee 20 cm at rest , 15 with overpressure from table , IT band mild tight foot to table level in carlos Current: NA Frequency / Duration: Patient to be seen 2 times per week for 6 weeks. Patient/ Caregiver education and instruction: Diagnosis, prognosis, self care, activity modification and exercises 
 [x]  Plan of care has been reviewed with ERWIN Espinal, PT 11/29/2018 1:36 PM 
 
________________________________________________________________________ I certify that the above Therapy Services are being furnished while the patient is under my care. I agree with the treatment plan and certify that this therapy is necessary. [de-identified] Signature:____________Date:_________TIME:________ 
 
[employer], Date and Time must be completed for valid certification ** Please sign and return to [facility] at THE [address], [address] [facility] [address] [phone]  Fx [phone]

## 2018-11-29 NOTE — PROGRESS NOTES
PT DAILY TREATMENT NOTE/HIP [id number] Patient Name: Jaime Tellez Date:2018 : 1970 [x]  Patient  Verified Payor: HAKAN / Plan: Javon Yo / Product Type: Manus Olden / In [id number]  Out time:1015 Total Treatment Time (min): 60 Visit #: 1 of 12 Treatment Area: Right hip pain [M25.551] SUBJECTIVE Pain Level (0-10 scale): right hip pain 0/10 received injection 2 wks ago   , now pain gets up to maybe 1/10 , ache stab and burn in past prior to shot  
[]constant [x]intermittent []improving []worsening []no change since onset Any medication changes, allergies to medications, adverse drug reactions, diagnosis change, or new procedure performed?: [x] No    [] Yes (see summary sheet for update) Subjective functional status/changes:    
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 PMHx/Surgical Hx: arthritis , sinus infection currently , previous cervical cancer had hysterectomy cancer free currently , appendectomy , carpal tunnel on right hand  , lump removed right side bottom benign , couple colonoscopy Work Hx: work at [employer] Situation: lives alone as  deployed ( returns 2018 ) Pt Goals: less pain Barriers: []pain []financial []time []transportation []other Motivation: good Substance use: []Alcohol []Tobacco []other: none ( smoke free x4 years ) FABQ Score: []low []elevate Cognition: A & O x 4    Other: OBJECTIVE/EXAMINATION Domestic Life: safe at home Activity/Recreational Limitations: more hestitant to move too much Mobility: ambulating without device Self Care: indep 35 min [x]Eval                  []Re-Eval  
 
 
25 min min Therapeutic Exercise:  [] See flow sheet :  
Rationale: increase ROM and improve coordination to improve the patients flexibility and symmetry of movement in trunk for improved functional mobility without pain With 
 [x] TE 
 [] TA 
 [] neuro 
 [] other: Patient Education: [x] Review HEP [] Progressed/Changed HEP based on:  
[x] positioning   [x] body mechanics   [] transfers   [] heat/ice application   
[] other:   
 
Other Objective/Functional Measures: FOTO : 44/100 Physical Therapy Evaluation- Hip Posture: = crest , = arm space , knee crease , arches high but = = sulcus Trunk mobility :  
Flexion : standing fingers to distal shin no pain no deviation   , 10x no pain increase mobility Extension : 14 deg np pain , 10x no pain  
Unilateral pressup : left side harder to perform per pt stiffer , performed 10 reps on right then recheck left side and noted mild looser on left when done , performed 10 reps with 5 sec hold on right then recheck left noted : \" a lot easier \" Alignment : = leg length , = ASIS , =PSIS Gait:  [x] Normal    [] Abnormal    [] Antalgic    [] NWB    Device: 
  Describe: 
      
ROM/Strength        AROM                     PROM        Strength (1-5) Hip Left Right Left Right Left Right Flexion 130 125 no pain    5 4 to 4+ Extension Abduction     5 5 Adduction     5 5 ER 49 48   4- 4 IR 35 32   5 4+ Knee Left Right Left Right Left Right Extension 0+ 0+   5 5 Flexion WNL  WNL   5 5 Ankle DF 5/5 bilat Flexibility: [] Unable to assess at this time Hamstrings: SLR 84 deg bilateral , no nerve tension (L) Tightness= [x] WNL   [] Min   [] Mod   [] Severe (R) Tightness= [x] WNL   [] Min   [] Mod   [] Severe Quadriceps: prone quad hip flexor mild tight right , neg on left (L) Tightness= [x] WNL   [] Min   [] Mod   [] Severe (R) Tightness= [] WNL   [x] Min   [] Mod   [] Severe Gastroc: (L) Tightness= [] WNL   [] Min   [] Mod   [] Severe (R) Tightness= [] WNL   [] Min   [] Mod   [] Severe Palpation:  mild tender at right PSIS area , mod superior and post Greater trochanter on right Optional Tests Monty/ Dhruv Test: [] Neg    [x] Pos tight on  ( 20/15 cm knee to table on right - tight ) Scouring Test: [x] Neg    [] Pos Trendelenberg:  [] Neg    [] Pos [] Left    [] Right OberTest:   [] Neg    [x] Pos (mild tight , foot to table level on right and pt notes good stretch ) Piriformis Test:  [] Neg    [x] Pos tightness some pain with stretch Functional Leg Length (cm): =malleoli Right:  Left:  Discrepancy:none Other tests/ comments: 
HEP : unilateral pressups , standing extension and regular pressups as tolerated ( has arthritis in right shoulder ) , prone quad and hip flexor stretch , piriformis stretch , IT band stretch Pain Level (0-10 scale) post treatment: 1-2 ASSESSMENT/Changes in Function: Patient is a 50 y. o.female who presents to PT with Right hip pain [M25.551]. Pt with ttp over post PSIS and superior and post right hip greater trochanter, no pain with compression or scour , + weakness in hip RIght > left . Mild tight quad hip flexor and piriformis and only mild tight IT band. Tight bilateral hip ER. The patient will benefit from skilled PT services to address these deficits and facilitate return to premorbid activity level and improved quality of life. Patient will continue to benefit from skilled PT services to modify and progress therapeutic interventions, address functional mobility deficits, address ROM deficits, address strength deficits, analyze and address soft tissue restrictions, analyze and cue movement patterns, analyze and modify body mechanics/ergonomics, assess and modify postural abnormalities and address imbalance/dizziness to attain remaining goals. [x]  See Plan of Care 
[]  See progress note/recertification 
[]  See Discharge Summary Progress towards goals / Updated goals: 
Short Term Goals: STG- To be accomplished in 3 week(s): 1. Pt will be compliant with HEP for max progression toward all goals and return to PLOF. Evalstarted on HEP will give handout next session Current: NA 
 
3. Pt FOTO score will increase by >=10 points to show improvement in functional mobility. Eval:44/100 Current: will address at visit 5 Long Term Goals: LTG- To be accomplished in 6 week(s): 1. Pt will be independant with HEP for continued and ongoing progression following discharge toward full functional mobility. Eval:started on HEP Current: NA 2. Pt pain and overall subjective function will improve >= 80% to allow pt return to PLOF. Eval: pain at occurrence high up to 8-9/10 but recent with injection 1-2/10 at most 6/10 , pt reports hesitancy with increase walking standing  due to pain Current: NA 
 
3. Pt FOTO score will increase by >=23 points to show improvement in functional mobility. Eval:44/100 Current: will reassess every 5th visit 4. Pt strength will improve to 5 to allow pt to return to PLOF no limit with walking and work standing bending lifting activities Eval:              Strength (1-5) Hip Left Right Flexion 5 4 to 4+ Extension Abduction 5 5 Adduction 5 5 ER 4- 4 IR 5 4+ Current: NA 
 
5.  Pt flexibility  will improve with knee <10 cm from table with emy heel to buttock in prone knee flexion and IT band foot past table level in carlos noting improve mobility without restriction Eval:emy knee 20 cm at rest , 15 with overpressure from table , IT band mild tight foot to table level in carlos Current: NA 
 
PLAN [x]  Upgrade activities as tolerated     [x]  Continue plan of care 
[]  Update interventions per flow sheet      
[]  Discharge due to:_ 
[]  Other:_ Dominique Buerger, PT 11/29/2018  9:21 AM

## 2018-12-05 ENCOUNTER — HOSPITAL ENCOUNTER (OUTPATIENT)
Dept: PHYSICAL THERAPY | Age: 48
Discharge: HOME | End: 2018-12-05
Payer: OTHER GOVERNMENT

## 2018-12-05 PROCEDURE — 97110 THERAPEUTIC EXERCISES: CPT

## 2018-12-05 NOTE — PROGRESS NOTES
PT DAILY TREATMENT NOTE - [facility]  Patient Name: Juana Bowles Date:2018 : 1970 [x]  Patient  Verified Payor: HAKAN / Plan: Ynes Singletary / Product Type: [address] [place] [address] Avenue / In time:1324  Out time:1405 Total Treatment Time (min): 41 Total Timed Codes (min): 41 
1:1 Treatment Time ([address] only): 41 Visit #: 2 of 12 Treatment Area: Right hip pain [M25.551] SUBJECTIVE Pain Level (0-10 scale): 1/10 Any medication changes, allergies to medications, adverse drug reactions, diagnosis change, or new procedure performed?: [x] No    [] Yes (see summary sheet for update) Subjective functional status/changes:   [] No changes reported Patient presents to PT; \"Injection helpful. \"   Minimal pain. Agreeable to participate. OBJECTIVE 41 min Therapeutic Exercise:  [x] See flow sheet :  
Rationale: increase strength and improve balance to improve the patients ability to tolerate WB activities. With 
 [x] TE 
 [] TA 
 [] neuro 
 [] other: Patient Education: [x] Review HEP [] Progressed/Changed HEP based on:  
[] positioning   [] body mechanics   [] transfers   [] heat/ice application   
[] other:   
 
 
 
Pain Level (0-10 scale) post treatment: 1/10 ASSESSMENT/Changes in Function: Tolerated treatment well. Multiple trigger points with foam roller; needs cues to avoid aberrant movement. Overall, did well. Needs cues to avoid aberrant movement. Patient will continue to benefit from skilled PT services to modify and progress therapeutic interventions, address functional mobility deficits, address strength deficits, analyze and address soft tissue restrictions, analyze and cue movement patterns, analyze and modify body mechanics/ergonomics and assess and modify postural abnormalities to attain remaining goals. [x]  See Plan of Care 
[]  See progress note/recertification 
[]  See Discharge Summary Progress towards goals / Updated goals: Short Term Goals: To be accomplished in 3 weeks: 1.  Pt will be compliant with HEP for max progression toward all goals and return to PLOF. Evalstarted on HEP will give handout next session  
Current: NA 
  
2. Pt FOTO score will increase by >=10 points to show improvement in functional mobility. Eval:44/100  
Current: will address at visit 5 
  
  
Long Term Goals: To be accomplished in 6 weeks: 1.  Pt will be independant with HEP for continued and ongoing progression following discharge toward full functional mobility. Eval:started on HEP  
Current: NA 
  
2.Pt pain and overall subjective function will improve >= 80% to allow pt return to PLOF. Eval: pain at occurrence high up to 8-9/10 but recent with injection 1-2/10 at most 6/10 , pt reports hesitancy with increase walking standing  due to pain Current: NA 
  
3. Pt FOTO score will increase by >=23 points to show improvement in functional mobility. Eval:44/100  
Current: will reassess every 5th visit  
  
4. Pt strength will improve to 5 to allow pt to return to PLOF no limit with walking and work standing bending lifting activities  
Eval:                 Strength (1-5) Hip Left Right Flexion 5 4 to 4+ Extension      
Abduction 5 5 Adduction 5 5 ER 4- 4 IR 5 4+  
  
Current: NA 
  
5. Pt flexibility  will improve with knee <10 cm from table with emy , heel to buttock in prone knee flexion and IT band foot past table level in carlos noting improve mobility without restriction  
Eval:emy knee 20 cm at rest , 15 with overpressure from table , IT band mild tight foot to table level in carlos  
Current: NA 
  
 
 
 
 
PLAN 
[]  Upgrade activities as tolerated     [x]  Continue plan of care 
[]  Update interventions per flow sheet      
[]  Discharge due to:_ 
[]  Other:_   
 
David Oconnell DPT, OCS 12/5/2018  1:25 PM 
 
Future Appointments Date Time Provider Chris Liz 12/5/2018  1:30 PM Carissa Deluca DPT [facility]  
12/6/2018 11:30 AM Carissa Deluca DPT [facility]  
12/12/2018 11:30 AM Carissa Deluca DPT [facility]  
12/13/2018  1:00 PM Palua Tovar PT [facility]

## 2018-12-06 ENCOUNTER — HOSPITAL ENCOUNTER (OUTPATIENT)
Dept: PHYSICAL THERAPY | Age: 48
Discharge: HOME | End: 2018-12-06
Payer: OTHER GOVERNMENT

## 2018-12-06 PROCEDURE — 97110 THERAPEUTIC EXERCISES: CPT

## 2018-12-12 ENCOUNTER — HOSPITAL ENCOUNTER (OUTPATIENT)
Dept: PHYSICAL THERAPY | Age: 48
Discharge: HOME | End: 2018-12-12
Payer: OTHER GOVERNMENT

## 2018-12-12 PROCEDURE — 97140 MANUAL THERAPY 1/> REGIONS: CPT

## 2018-12-12 PROCEDURE — 97110 THERAPEUTIC EXERCISES: CPT

## 2018-12-12 NOTE — PROGRESS NOTES
PT DAILY TREATMENT NOTE - [facility]     Patient Name: Chalino Aranda  Date:2018  : 1970  [x]  Patient  Verified  Payor:  / Plan: Angelia Locket / Product Type:  /    In time:1134  Out time:1215  Total Treatment Time (min): 41  Total Timed Codes (min): 41  1:1 Treatment Time ([facility] only): 41  Visit #: 4 of 12    Treatment Area: Right hip pain [M25.551]    SUBJECTIVE  Pain Level (0-10 scale): 5-6/10  Any medication changes, allergies to medications, adverse drug reactions, diagnosis change, or new procedure performed?: [x] No    [] Yes (see summary sheet for update)  Subjective functional status/changes:   [] No changes reported  Having difficulty moving today and increased pain; feels this is secondary to having family visiting and has spent several active days to include shopping, etc.  IBS a little flared up as well. OBJECTIVE  Modalities:  MH to LS spine x 10 min     21 min Therapeutic Exercise:  [x] See flow sheet :   Rationale: increase strength and improve balance to improve the patients ability to tolerate WB activities. 10 min Manual Therapy:  Hip distraction; lumbar distraction; sacral mobilization to facilitate extension; ant hip mob   Rationale: decrease pain, increase ROM and increase tissue extensibility to tolerate walking. With   [x] TE   [] TA   [] neuro   [] other: Patient Education: [x] Review HEP    [] Progressed/Changed HEP based on:   [] positioning   [] body mechanics   [] transfers   [] heat/ice application    [] other:          Pain Level (0-10 scale) post treatment: 2/10    ASSESSMENT/Changes in Function:   Much of today's presentation related to lumbar spine; difficulty with lumbar extension and mildly improved post treatment. Suspect today's exacerbation may be related to activities out of the norm (increased walking, etc) and maybe partially referred from IBS flare up.      Patient will continue to benefit from skilled PT services to modify and progress therapeutic interventions, address functional mobility deficits, address strength deficits, analyze and address soft tissue restrictions, analyze and cue movement patterns, analyze and modify body mechanics/ergonomics and assess and modify postural abnormalities to attain remaining goals. [x]  See Plan of Care  []  See progress note/recertification  []  See Discharge Summary         Progress towards goals / Updated goals:  Short Term Goals: To be accomplished in 3 weeks:  1.  Pt will be compliant with HEP for max progression toward all goals and return to PLOF. Evalstarted on HEP will give handout next session   Current: NA     2. Pt FOTO score will increase by >=10 points to show improvement in functional mobility. Eval:44/100   Current: will address at visit 5        Long Term Goals: To be accomplished in 6 weeks:  1.  Pt will be independant with HEP for continued and ongoing progression following discharge toward full functional mobility. Eval:started on HEP   Current: NA     2.Pt pain and overall subjective function will improve >= 80% to allow pt return to PLOF. Eval: pain at occurrence high up to 8-9/10 but recent with injection 1-2/10 at most 6/10 , pt reports hesitancy with increase walking standing  due to pain   Current: NA     3. Pt FOTO score will increase by >=23 points to show improvement in functional mobility. Eval:44/100   Current: will reassess every 5th visit      4.  Pt strength will improve to 5 to allow pt to return to PLOF no limit with walking and work standing bending lifting activities   Eval:                 Strength (1-5)  Hip Left Right   Flexion 5 4 to 4+   Extension       Abduction 5 5   Adduction 5 5   ER 4- 4   IR 5 4+      Current: NA     5. Pt flexibility  will improve with knee <10 cm from table with emy , heel to buttock in prone knee flexion and IT band foot past table level in carlos noting improve mobility without restriction   Eval:emy knee 20 cm at rest , 15 with overpressure from table , IT band mild tight foot to table level in carlos   Current: NA             PLAN  []  Upgrade activities as tolerated     [x]  Continue plan of care  []  Update interventions per flow sheet       []  Discharge due to:_  []  Other:    Merly Rajan DPT, OCS 12/12/2018  1235 PM    Future Appointments   Date Time Provider Chris Liz   12/13/2018  1:00 PM Sherri Montiel, PT [facility]

## 2018-12-13 ENCOUNTER — HOSPITAL ENCOUNTER (OUTPATIENT)
Dept: PHYSICAL THERAPY | Age: 48
Discharge: HOME | End: 2018-12-13
Payer: OTHER GOVERNMENT

## 2018-12-13 PROCEDURE — 97110 THERAPEUTIC EXERCISES: CPT

## 2018-12-13 PROCEDURE — 97140 MANUAL THERAPY 1/> REGIONS: CPT

## 2018-12-13 NOTE — PROGRESS NOTES
PT DAILY TREATMENT NOTE - [facility]     Patient Name: Juana Bowles  Date:2018  : 1970  [x]  Patient  Verified  Payor:  / Plan: [facility] / Product Type:  /    In time:5  Out time:11  Total Treatment Time (min): 35  Total Timed Codes (min): 35  1:1 Treatment Time ( [address] only): 35  Visit #: 4 of 12    Treatment Area: Right hip pain [M25.551]    SUBJECTIVE  Pain Level (0-10 scale): 0/10  Any medication changes, allergies to medications, adverse drug reactions, diagnosis change, or new procedure performed?: [x] No    [] Yes (see summary sheet for update)  Subjective functional status/changes:   [] No changes reported  I am feeling so much better since the visit yesterday. No pain    OBJECTIVE  Modalities:  MH to LS spine x 10 min     25 min Therapeutic Exercise:  [x] See flow sheet :   Rationale: increase strength and improve balance to improve the patients ability to tolerate WB activities. 10 min Manual Therapy:  Hip distraction; lumbar distraction; sacral mobilization to facilitate extension; ant hip mob   Rationale: decrease pain, increase ROM and increase tissue extensibility to tolerate walking. With   [x] TE   [] TA   [] neuro   [] other: Patient Education: [x] Review HEP    [] Progressed/Changed HEP based on:   [] positioning   [] body mechanics   [] transfers   [] heat/ice application    [] other:          Pain Level (0-10 scale) post treatment: 0/10    ASSESSMENT/Changes in Function: Good tolerance to all activities without increase in pain      Patient will continue to benefit from skilled PT services to modify and progress therapeutic interventions, address functional mobility deficits, address strength deficits, analyze and address soft tissue restrictions, analyze and cue movement patterns, analyze and modify body mechanics/ergonomics and assess and modify postural abnormalities to attain remaining goals.      [x]  See Plan of Care  []  See progress note/recertification  []  See Discharge Summary         Progress towards goals / Updated goals:  Short Term Goals: To be accomplished in 3 weeks:  1.  Pt will be compliant with HEP for max progression toward all goals and return to PLOF. Evalstarted on HEP will give handout next session   Current: NA     2. Pt FOTO score will increase by >=10 points to show improvement in functional mobility. Eval:44/100   Current: will address at visit 6        Long Term Goals: To be accomplished in 6 weeks:  1.  Pt will be independant with HEP for continued and ongoing progression following discharge toward full functional mobility. Eval:started on HEP   Current: NA     2.Pt pain and overall subjective function will improve >= 80% to allow pt return to PLOF. Eval: pain at occurrence high up to 8-9/10 but recent with injection 1-2/10 at most 6/10 , pt reports hesitancy with increase walking standing  due to pain   Current: NA     3. Pt FOTO score will increase by >=23 points to show improvement in functional mobility. Eval:44/100   Current: will reassess every 6th visit      4.  Pt strength will improve to 5 to allow pt to return to PLOF no limit with walking and work standing bending lifting activities   Eval:                 Strength (1-5)  Hip Left Right   Flexion 5 4 to 4+   Extension       Abduction 5 5   Adduction 5 5   ER 4- 4   IR 5 4+      Current: NA     5. Pt flexibility  will improve with knee <10 cm from table with emy , heel to buttock in prone knee flexion and IT band foot past table level in carlos noting improve mobility without restriction   Eval:emy knee 20 cm at rest , 15 with overpressure from table , IT band mild tight foot to table level in carlos   Current: NA             PLAN  []  Upgrade activities as tolerated     [x]  Continue plan of care  []  Update interventions per flow sheet       []  Discharge due to:_  []  Other:    Jeffery Tucker, PT, OCS 12/13/2018  1235 PM    Future Appointments   Date Time Provider Chris Liz   12/18/2018  8:00 AM Iesha Flaherty DPT [facility]   12/21/2018  8:00 AM Nery Christina, PT [facility]

## 2018-12-18 ENCOUNTER — HOSPITAL ENCOUNTER (OUTPATIENT)
Dept: PHYSICAL THERAPY | Age: 48
Discharge: HOME | End: 2018-12-18
Payer: OTHER GOVERNMENT

## 2018-12-18 PROCEDURE — 97110 THERAPEUTIC EXERCISES: CPT

## 2018-12-18 PROCEDURE — 97140 MANUAL THERAPY 1/> REGIONS: CPT

## 2018-12-18 PROCEDURE — 97112 NEUROMUSCULAR REEDUCATION: CPT

## 2018-12-18 NOTE — PROGRESS NOTES
PT DAILY TREATMENT NOTE - [facility]     Patient Name: Jaime Tellez  Date:2018  : 1970  [x]  Patient  Verified  Payor:  / Plan: Javon Yo / Product Type:  /    In time:8  Out time:846  Total Treatment Time (min): 46  Total Timed Codes (min): 46  1:1 Treatment Time ( [address] only): n/a  Visit #: 7 of 12    Treatment Area: Right hip pain [M25.551]    SUBJECTIVE  Pain Level (0-10 scale): 3/10  Any medication changes, allergies to medications, adverse drug reactions, diagnosis change, or new procedure performed?: [x] No    [] Yes (see summary sheet for update)  Subjective functional status/changes:   [] No changes reported  My back is a lot better but my right buttock hurts    OBJECTIVE  Modalities:  MH to LS spine x 10 min     23 min Therapeutic Exercise:  [x] See flow sheet :   Rationale: increase strength and improve balance to improve the patients ability to tolerate WB activities. 10 min Manual Therapy:  Hip distraction; lumbar distraction; sacral mobilization to facilitate extension; ant hip mob   Rationale: decrease pain, increase ROM and increase tissue extensibility to tolerate walking.     13 min Neuromuscular Reeducation: JOANIE for alignment and core activation, inhibition of right piriformis and QL          With   [x] TE   [] TA   [] neuro   [] other: Patient Education: [x] Review HEP    [] Progressed/Changed HEP based on:   [] positioning   [] body mechanics   [] transfers   [] heat/ice application    [] other:          Pain Level (0-10 scale) post treatment: 0/10    ASSESSMENT/Changes in Function: Good tolerance to all activities without increase in pain  TTP right piriformis  Bilateral (+) ADT      Patient will continue to benefit from skilled PT services to modify and progress therapeutic interventions, address functional mobility deficits, address strength deficits, analyze and address soft tissue restrictions, analyze and cue movement patterns, analyze and modify body mechanics/ergonomics and assess and modify postural abnormalities to attain remaining goals. [x]  See Plan of Care  []  See progress note/recertification  []  See Discharge Summary         Progress towards goals / Updated goals:  Short Term Goals: To be accomplished in 3 weeks:  1.  Pt will be compliant with HEP for max progression toward all goals and return to PLOF. Evalstarted on HEP will give handout next session   Current: NA     2. Pt FOTO score will increase by >=10 points to show improvement in functional mobility. Eval:44/100   Current: FOTO 48/100, progressing        Long Term Goals: To be accomplished in 6 weeks:  1.  Pt will be independant with HEP for continued and ongoing progression following discharge toward full functional mobility. Eval:started on HEP   Current: compliant with HEP, independence progressing     2. Pt pain and overall subjective function will improve >= 80% to allow pt return to PLOF. Eval: pain at occurrence high up to 8-9/10 but recent with injection 1-2/10 at most 6/10 , pt reports hesitancy with increase walking standing  due to pain   Current: NA     3. Pt FOTO score will increase by >=23 points to show improvement in functional mobility. Eval:44/100   Current: will reassess every 6th visit      4.  Pt strength will improve to 5 to allow pt to return to PLOF no limit with walking and work standing bending lifting activities   Eval:                 Strength (1-5)  Hip Left Right   Flexion 5 4 to 4+   Extension       Abduction 5 5   Adduction 5 5   ER 4- 4   IR 5 4+      Current: NA     5. Pt flexibility  will improve with knee <10 cm from table with emy , heel to buttock in prone knee flexion and IT band foot past table level in carlos noting improve mobility without restriction   Eval:emy knee 20 cm at rest , 15 with overpressure from table , IT band mild tight foot to table level in carlos   Current: NA             PLAN  []  Upgrade activities as tolerated     [x]  Continue plan of care  []  Update interventions per flow sheet       []  Discharge due to:_  []  Other:    Erika Alejo, PT, OCS 12/18/2018  1235 PM    Future Appointments   Date Time Provider Chris Liz   12/21/2018  8:00 AM Rula Duarte PT [facility]

## 2018-12-21 ENCOUNTER — HOSPITAL ENCOUNTER (OUTPATIENT)
Dept: PHYSICAL THERAPY | Age: 48
Discharge: HOME | End: 2018-12-21
Payer: OTHER GOVERNMENT

## 2018-12-21 PROCEDURE — 97140 MANUAL THERAPY 1/> REGIONS: CPT

## 2018-12-21 PROCEDURE — 97110 THERAPEUTIC EXERCISES: CPT

## 2018-12-21 NOTE — PROGRESS NOTES
PT DAILY TREATMENT NOTE - [facility]     Patient Name: Janki Primer  Date:2018  : 1970  [x]  Patient  Verified  Payor: HAKAN / Plan: Anthony Rahman 74 / Product Type:  /    In time:8  Out time:845  Total Treatment Time (min): 45  Total Timed Codes (min): 45  1:1 Treatment Time ( [address] only): n/a  Visit #: 7 of 12    Treatment Area: Right hip pain [M25.551]    SUBJECTIVE  Pain Level (0-10 scale): 0/10  Any medication changes, allergies to medications, adverse drug reactions, diagnosis change, or new procedure performed?: [x] No    [] Yes (see summary sheet for update)  Subjective functional status/changes:   [] No changes reported  My back feels fine but my stomach hurts. I would like to not lie on my stomach    OBJECTIVE  Modalities:  MH to LS spine x 10 min     25 min Therapeutic Exercise:  [x] See flow sheet :core strength, alignment, stretching, added Wayne stretch   Rationale: increase strength and improve balance to improve the patients ability to tolerate WB activities. 10 min Manual Therapy:  Hip distraction; lumbar distraction; sacral mobilization to facilitate extension; ant hip mob   Rationale: decrease pain, increase ROM and increase tissue extensibility to tolerate walking.             With   [x] TE   [] TA   [] neuro   [] other: Patient Education: [x] Review HEP    [] Progressed/Changed HEP based on:   [] positioning   [] body mechanics   [] transfers   [] heat/ice application    [] other:          Pain Level (0-10 scale) post treatment: 0/10    ASSESSMENT/Changes in Function: Good tolerance to all activities without increase in pain  TTP right piriformis  Mild TTP right QL  Tight hip flex      Patient will continue to benefit from skilled PT services to modify and progress therapeutic interventions, address functional mobility deficits, address strength deficits, analyze and address soft tissue restrictions, analyze and cue movement patterns, analyze and modify body mechanics/ergonomics and assess and modify postural abnormalities to attain remaining goals. [x]  See Plan of Care  [x]  See progress note/recertification  []  See Discharge Summary         Progress towards goals / Updated goals:  Short Term Goals: To be accomplished in 3 weeks:  1.  Pt will be compliant with HEP for max progression toward all goals and return to PLOF. Evalstarted on HEP will give handout next session   Current:compliant with HEP, progressing     2. Pt FOTO score will increase by >=10 points to show improvement in functional mobility. Eval:44/100   Current: FOTO 48/100, progressing        Long Term Goals: To be accomplished in 6 weeks:  1.  Pt will be independant with HEP for continued and ongoing progression following discharge toward full functional mobility. Eval:started on HEP   Current: compliant with HEP, independence progressing     2. Pt pain and overall subjective function will improve >= 80% to allow pt return to PLOF. Eval: pain at occurrence high up to 8-9/10 but recent with injection 1-2/10 at most 6/10 , pt reports hesitancy with increase walking standing  due to pain   Current: pain average 2/10 with ADL, goal met     3. Pt FOTO score will increase by >=23 points to show improvement in functional mobility. Eval:44/100   Current: 48/100, progressing     4.  Pt strength will improve to 5 to allow pt to return to PLOF no limit with walking and work standing bending lifting activities   Eval:                 Strength (1-5)  Hip Left Right   Flexion 5 4 to 4+   Extension       Abduction 5 5   Adduction 5 5   ER 4- 4   IR 5 4+      Current: NA     5. Pt flexibility  will improve with knee <10 cm from table with kirt , heel to buttock in prone knee flexion and IT band foot past table level in carlos noting improve mobility without restriction   Eval:kirt knee 20 cm at rest , 15 with overpressure from table , IT band mild tight foot to table level in carlos   Current: Kirt at 20 cm at rest             PLAN  []  Upgrade activities as tolerated     [x]  Continue plan of care  []  Update interventions per flow sheet       []  Discharge due to:_  []  Other:    Odilia Escobedo, PT, OCS 12/21/2018  1235 PM    Future Appointments   Date Time Provider Chris Liz   1/2/2019  9:30 AM Jason Lombardi, PT [address] [facility]   1/4/2019 10:30 AM Iris Garcia [address] [place] [facility]   1/8/2019  9:00 AM KAM CrabtreeT [address] [facility]   1/11/2019  9:30 AM Boubacar Murguia, PT [address] [facility]

## 2018-12-21 NOTE — PROGRESS NOTES
[facility] at THE [address]  [facility], [address]  Ph [phone]  Fx [phone]    Physical Therapy Progress Note  Patient name: Malissa Foster Start of Care: 2018   Referral source: Dino Jones* : 1970                Medical Diagnosis: Right hip pain [M25.551]    Onset Date:2018    Treatment Diagnosis: right hip pain    Prior Hospitalization: see medical history Provider#: [id number]   Medications: Verified on Patient summary List    Comorbidities: arthritis , prior hx cancer    Prior Level of Function: no limitation           Visits from Start of Care: 7    Missed Visits: 0    Progress towards goals / Updated goals:  Short Term Goals: To be accomplished in 3 weeks:  1.  Pt will be compliant with HEP for max progression toward all goals and return to PLOF. Evalstarted on HEP will give handout next session   Current:compliant with HEP, progressing     2. Pt FOTO score will increase by >=10 points to show improvement in functional mobility. Eval:44/100   Current: FOTO 48/100, progressing        Long Term Goals: To be accomplished in 6 weeks:  1.  Pt will be independant with HEP for continued and ongoing progression following discharge toward full functional mobility. Eval:started on HEP   Current: compliant with HEP, independence progressing     2. Pt pain and overall subjective function will improve >= 80% to allow pt return to PLOF. Eval: pain at occurrence high up to 8-9/10 but recent with injection 1-2/10 at most 6/10 , pt reports hesitancy with increase walking standing  due to pain   Current: pain average 2/10 with ADL, goal met     3. Pt FOTO score will increase by >=23 points to show improvement in functional mobility. Eval:44/100   Current: 48/100, progressing     4.  Pt strength will improve to 5 to allow pt to return to PLOF no limit with walking and work standing bending lifting activities   Eval:                 Strength (1-5)  Hip Left Right   Flexion 5 4 to 4+   Extension       Abduction 5 5   Adduction 5 5   ER 4- 4   IR 5 4+      Current: NA     5. Pt flexibility  will improve with knee <10 cm from table with emy , heel to buttock in prone knee flexion and IT band foot past table level in carlos noting improve mobility without restriction   Eval:emy knee 20 cm at rest , 15 with overpressure from table , IT band mild tight foot to table level in carlos   Current: NA    Key Functional Changes: returned to all ADL    ASSESSMENT/RECOMMENDATIONS:Mrs. Michael Kasper is showing nice progress in pain levels and overall function.  She continues with residual mild pain/left lower quadrant tightness and will benefit from continued PT as per POC  [x]Continue therapy per initial plan/protocol at a frequency of  2 x per week for 6 weeks  []Continue therapy with the following recommended changes:_____________________      _____________________________________________________________________  []Discontinue therapy progressing towards or have reached established goals  []Discontinue therapy due to lack of appreciable progress towards goals  []Discontinue therapy due to lack of attendance or compliance  []Await Physician's recommendations/decisions regarding therapy  []Other:________________________________________________________________    Thank you for this referral.   Gume Johnston, PT 12/21/2018 8:40 AM    NOTE TO PHYSICIAN:  PLEASE COMPLETE THE ORDERS BELOW AND   FAX TO [facility]: [phone]  If you are unable to process this request in 24 hours please contact our office: [phone]      ____ I have read the above report and request that my patient continue therapy with the following changes/special instructions:  ____ I have read the above report and request that my patient be discharged from therapy    Physician's Signature:____________Date:_________TIME:________    ** Signature, Date and Time must be completed for valid certification **

## 2019-01-02 ENCOUNTER — APPOINTMENT (OUTPATIENT)
Dept: PHYSICAL THERAPY | Age: 49
End: 2019-01-02
Payer: OTHER GOVERNMENT

## 2019-01-03 ENCOUNTER — APPOINTMENT (OUTPATIENT)
Dept: PHYSICAL THERAPY | Age: 49
End: 2019-01-03
Payer: OTHER GOVERNMENT

## 2019-01-04 ENCOUNTER — APPOINTMENT (OUTPATIENT)
Dept: PHYSICAL THERAPY | Age: 49
End: 2019-01-04
Payer: OTHER GOVERNMENT

## 2019-01-08 ENCOUNTER — HOSPITAL ENCOUNTER (OUTPATIENT)
Dept: PHYSICAL THERAPY | Age: 49
Discharge: HOME | End: 2019-01-08
Payer: OTHER GOVERNMENT

## 2019-01-08 PROCEDURE — 97110 THERAPEUTIC EXERCISES: CPT

## 2019-01-08 NOTE — PROGRESS NOTES
PT DAILY TREATMENT NOTE - [facility]  Patient Name: Bibi Black Date:2019 : 1970 [x]  Patient  Verified Payor: HAKAN / Plan: Eros Badillo / Product Type: Merry Leak / In time: 8:55  Out time: 945 Total Treatment Time (min): 50 Total Timed Codes (min): 40 
1:1 Treatment Time ( only): n/a Visit #: 8 of 12 Treatment Area: Right hip pain [M25.551] SUBJECTIVE Pain Level (0-10 scale): 1/10 Any medication changes, allergies to medications, adverse drug reactions, diagnosis change, or new procedure performed?: [x] No    [] Yes (see summary sheet for update) Subjective functional status/changes:   [] No changes reported Feeling much better. Unable to lay unto right side, but otherwise doing well. OBJECTIVEModalities:  MH to LS spine and R hip x 10 min post treatment;  Skin integrity intact 40 min Therapeutic Exercise:  [x] See flow sheet Rationale: increase strength and improve balance to improve the patients ability to tolerate WB activities. With 
 [x] TE 
 [] TA 
 [] neuro 
 [] other: Patient Education: [x] Review HEP [] Progressed/Changed HEP based on:  
[] positioning   [] body mechanics   [] transfers   [] heat/ice application   
[] other:   
 
 
 
Pain Level (0-10 scale) post treatment: 0/10 ASSESSMENT/Changes in Function: Needs improved motor control through core and pelvic girdle musculature - requires moderate cues for exercises such as the bird dog to avoid aberrant movement. Requires cues for proper breathing. Patient will continue to benefit from skilled PT services to modify and progress therapeutic interventions, address functional mobility deficits, address strength deficits, analyze and address soft tissue restrictions, analyze and cue movement patterns, analyze and modify body mechanics/ergonomics and assess and modify postural abnormalities to attain remaining goals. [x]  See Plan of Care [x]  See progress note/recertification 
[]  See Discharge Summary Progress towards goals / Updated goals: 
Short Term Goals: To be accomplished in 3 weeks: 1.  Pt will be compliant with HEP for max progression toward all goals and return to PLOF. Evalstarted on HEP will give handout next session  
Current:compliant with HEP, progressing 
  
2. Pt FOTO score will increase by >=10 points to show improvement in functional mobility. Eval:44/100  
Current: FOTO 48/100, progressing 
  
  
Long Term Goals: To be accomplished in 6 weeks: 1.  Pt will be independant with HEP for continued and ongoing progression following discharge toward full functional mobility. Eval:started on HEP  
Current: compliant with HEP, independence progressing 
  2. Pt pain and overall subjective function will improve >= 80% to allow pt return to PLOF. Eval: pain at occurrence high up to 8-9/10 but recent with injection 1-2/10 at most 6/10 , pt reports hesitancy with increase walking standing  due to pain Current: pain average 2/10 with ADL, goal met 
  
3. Pt FOTO score will increase by >=23 points to show improvement in functional mobility. Eval:44/100  
Current: 48/100, progressing 
  
4. Pt strength will improve to 5 to allow pt to return to PLOF no limit with walking and work standing bending lifting activities  
Eval:                 Strength (1-5) Hip Left Right Flexion 5 4 to 4+ Extension      
Abduction 5 5 Adduction 5 5 ER 4- 4 IR 5 4+  
  
Current: NA 
  
5. Pt flexibility  will improve with knee <10 cm from table with kirt , heel to buttock in prone knee flexion and IT band foot past table level in carlos noting improve mobility without restriction  
Eval:kirt knee 20 cm at rest , 15 with overpressure from table , IT band mild tight foot to table level in carlos  
Current: Kirt at 20 cm at rest 
  
 
 
 
 
PLAN 
[]  Upgrade activities as tolerated     [x]  Continue plan of care []  Update interventions per flow sheet      
[]  Discharge due to:_ 
[]  Other: 
 
Yessica Foster DPT, OCS 1/8/2019  1235 PM 
 
Future Appointments Date Time Provider Chris Liz 1/10/2019  3:00 [facility]

## 2019-01-10 ENCOUNTER — HOSPITAL ENCOUNTER (OUTPATIENT)
Dept: PHYSICAL THERAPY | Age: 49
Discharge: HOME | End: 2019-01-10
Payer: OTHER GOVERNMENT

## 2019-01-10 PROCEDURE — 97110 THERAPEUTIC EXERCISES: CPT | Performed by: PHYSICAL THERAPIST

## 2019-01-10 NOTE — PROGRESS NOTES
PT DAILY TREATMENT NOTE  Patient Name: Bibi Black Date:1/10/2019 : 1970 [x]  Patient  Verified Payor: HAKAN / Plan: Anthony Rahman 74 / Product Type: Merry Leak / In time:1012  Out time:1102 Total Treatment Time (min): 50 Visit #: 9 of 12 Treatment Area: Right hip pain [M25.551] SUBJECTIVE Pain Level (0-10 scale): 0 Any medication changes, allergies to medications, adverse drug reactions, diagnosis change, or new procedure performed?: [x] No    [] Yes (see summary sheet for update) Subjective functional status/changes:   [] No changes reported Pt feels she is 85% since start of therapy , still painful to lie on right side but due to having a bad cold has been lying on right more . 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 With 
 [] TE 
 [] TA 
 [] neuro 
 [] other: Patient Education: [x] Review HEP [] Progressed/Changed HEP based on:  
[] positioning   [] body mechanics   [] transfers   [] heat/ice application   
[] other:   
 
Other Objective/Functional Measures: see ex Pain Level (0-10 scale) post treatment: 0 
 
ASSESSMENT/Changes in Function: pt with ongoing cold limiting focus needing cues with ex to perform correctly and watch posture and control Patient will continue to benefit from skilled PT services to modify and progress therapeutic interventions, address functional mobility deficits, address ROM deficits, address strength deficits, analyze and address soft tissue restrictions, analyze and cue movement patterns, analyze and modify body mechanics/ergonomics and assess and modify postural abnormalities to attain remaining goals. [x]  See Plan of Care 
[]  See progress note/recertification 
[]  See Discharge Summary Progress towards goals / Updated goals: 
Short Term Goals: To be accomplished in 3 weeks: 1.  Pt will be compliant with HEP for max progression toward all goals and return to PLOF. Evalstarted on HEP will give handout next session  
Current:compliant with HEP,doing home ex couple times a week mainly stretches  progressing 
  
2. Pt FOTO score will increase by >=10 points to show improvement in functional mobility. Eval:44/100  
Current: FOTO 48/100, progressing 
  
  
Long Term Goals: To be accomplished in 6 weeks: 1.  Pt will be independant with HEP for continued and ongoing progression following discharge toward full functional mobility. Eval:started on HEP  
Current: compliant with HEP, independence progressing 
  2. Pt pain and overall subjective function will improve >= 80% to allow pt return to PLOF. Eval: pain at occurrence high up to 8-9/10 but recent with injection 1-2/10 at most 6/10 , pt reports hesitancy with increase walking standing  due to pain Current: pain average 0-2/10 with ADL, goal met 
  
3. Pt FOTO score will increase by >=23 points to show improvement in functional mobility. Eval:44/100  
Current: 48/100, progressing 
  
4. Pt strength will improve to 5 to allow pt to return to PLOF no limit with walking and work standing bending lifting activities  
Eval:                 Strength (1-5) Hip Left Right Flexion 5 4 to 4+ Extension      
Abduction 5 5 Adduction 5 5 ER 4- 4 IR 5 4+  
  
Current: NA 
  
5. Pt flexibility  will improve with knee <10 cm from table with emy , heel to buttock in prone knee flexion and IT band foot past table level in carlos noting improve mobility without restriction  
Eval:emy knee 20 cm at rest , 15 with overpressure from table , IT band mild tight foot to table level in carlos  
 Current: Today : Mitali Beasley at 20 cm at rest, able to get heel to buttock in prone knee flexion , carlos foot past table level without pain just stretch met for all but Mitali Beasley PLAN [x]  Upgrade activities as tolerated     [x]  Continue plan of care 
[]  Update interventions per flow sheet      
[]  Discharge due to:_ 
[]  Other:_ Timur Hannon PT 1/10/2019  10:16 AM 
 
Future Appointments Date Time Provider Chris Liz 1/15/2019 10:00 AM KAM PottsT [facility]  
1/16/2019  8:30 AM Peter Templeton, PT [facility]

## 2019-01-11 ENCOUNTER — APPOINTMENT (OUTPATIENT)
Dept: PHYSICAL THERAPY | Age: 49
End: 2019-01-11
Payer: OTHER GOVERNMENT

## 2019-01-15 ENCOUNTER — HOSPITAL ENCOUNTER (OUTPATIENT)
Dept: PHYSICAL THERAPY | Age: 49
Discharge: HOME | End: 2019-01-15
Payer: OTHER GOVERNMENT

## 2019-01-15 PROCEDURE — 97110 THERAPEUTIC EXERCISES: CPT

## 2019-01-15 NOTE — PROGRESS NOTES
PT DAILY TREATMENT NOTE  Patient Name: Becky Messina Date:1/15/2019 : 1970 [x]  Patient  Verified Payor: HAKAN / Plan: Catrachito Poon / Product Type: Carvel Dears / In time:950  Out time:1032 Total Treatment Time (min): 42 Visit #: 9 of 12 Treatment Area: Right hip pain [M25.551] SUBJECTIVE Pain Level (0-10 scale): 0 Any medication changes, allergies to medications, adverse drug reactions, diagnosis change, or new procedure performed?: [x] No    [] Yes (see summary sheet for update) Subjective functional status/changes:   [x] No changes reported OBJECTIVE 40 min Therapeutic Exercise:  [x] See flow sheet Rationale: increase strength and improve balance to improve the patients ability to tolerate WB activities. Gentle hip distraction x 2 min With 
 [x] TE 
 [] TA 
 [] neuro 
 [] other: Patient Education: [x] Review HEP [] Progressed/Changed HEP based on:  
[] positioning   [x] body mechanics   [] transfers   [] heat/ice application   
[] other:   
 
  
 
Pain Level (0-10 scale) post treatment: 0 
 
ASSESSMENT/Changes in Function: Good progression; Needs improvement with motor control and practice, but overall decreased flare ups and prepping for d/c planning. Reiterated importance of home program.  
 
Patient will continue to benefit from skilled PT services to modify and progress therapeutic interventions, address functional mobility deficits, address ROM deficits, address strength deficits, analyze and address soft tissue restrictions, analyze and cue movement patterns, analyze and modify body mechanics/ergonomics and assess and modify postural abnormalities to attain remaining goals. [x]  See Plan of Care 
[]  See progress note/recertification 
[]  See Discharge Summary Progress towards goals / Updated goals: 
Short Term Goals: To be accomplished in 3 weeks: 1.  Pt will be compliant with HEP for max progression toward all goals and return to PLOF. Evalstarted on HEP will give handout next session  
Current:compliant with HEP,doing home ex couple times a week mainly stretches  progressing 
  
2. Pt FOTO score will increase by >=10 points to show improvement in functional mobility. Eval:44/100  
Current: FOTO 48/100, progressing 
  
  
Long Term Goals: To be accomplished in 6 weeks: 1.  Pt will be independant with HEP for continued and ongoing progression following discharge toward full functional mobility. Eval:started on HEP  
Current: compliant with HEP, independence progressing 
  2. Pt pain and overall subjective function will improve >= 80% to allow pt return to PLOF. Eval: pain at occurrence high up to 8-9/10 but recent with injection 1-2/10 at most 6/10 , pt reports hesitancy with increase walking standing  due to pain Current: pain average 0-2/10 with ADL, goal met 
  
3. Pt FOTO score will increase by >=23 points to show improvement in functional mobility. Eval:44/100  
Current: 48/100, progressing 
  
4. Pt strength will improve to 5 to allow pt to return to PLOF no limit with walking and work standing bending lifting activities  
Eval:                 Strength (1-5) Hip Left Right Flexion 5 4 to 4+ Extension      
Abduction 5 5 Adduction 5 5 ER 4- 4 IR 5 4+  
  
Current: NA 
  
5. Pt flexibility  will improve with knee <10 cm from table with kirt , heel to buttock in prone knee flexion and IT band foot past table level in carlos noting improve mobility without restriction  
Eval:kirt knee 20 cm at rest , 15 with overpressure from table , IT band mild tight foot to table level in carlos  
Current: Today : Kirt at 20 cm at rest, able to get heel to buttock in prone knee flexion , carlos foot past table level without pain just stretch met for all but Mitali Beasley PLAN 
 [x]  Upgrade activities as tolerated     [x]  Continue plan of care; begin d/c planning - HEP advancement 
[]  Update interventions per flow sheet      
[]  Discharge due to:_ 
[]  Other:_   
 
Jose Chung DPT, OCS 1/15/2019  10:16 AM 
 
Future Appointments Date Time Provider Chris Liz 1/16/2019  8:30 AM Pari Vidales PT [facility]  
1/22/2019  9:00 AM Royer King DPT [facility]

## 2019-01-16 ENCOUNTER — APPOINTMENT (OUTPATIENT)
Dept: PHYSICAL THERAPY | Age: 49
End: 2019-01-16
Payer: OTHER GOVERNMENT

## 2019-01-22 ENCOUNTER — HOSPITAL ENCOUNTER (OUTPATIENT)
Dept: PHYSICAL THERAPY | Age: 49
Discharge: HOME | End: 2019-01-22
Payer: OTHER GOVERNMENT

## 2019-01-22 PROCEDURE — 97110 THERAPEUTIC EXERCISES: CPT

## 2019-01-22 NOTE — PROGRESS NOTES
PT DAILY TREATMENT NOTE  Patient Name: Gonzalez Barlow Date:2019 : 1970 [x]  Patient  Verified Payor: HAKAN / Plan: Hung Levin / Product Type: Ana Setters / In time:850  Out time:930 Total Treatment Time (min): 40 Visit #: 56 QA 42 Treatment Area: Right hip pain [M25.551] SUBJECTIVE Pain Level (0-10 scale): 0 Any medication changes, allergies to medications, adverse drug reactions, diagnosis change, or new procedure performed?: [x] No    [] Yes (see summary sheet for update) Subjective functional status/changes:   [x] No changes reported OBJECTIVE 40 min Therapeutic Exercise:  [x] See flow sheet Rationale: increase strength and improve balance to improve the patients ability to tolerate WB activities. With 
 [x] TE 
 [] TA 
 [] neuro 
 [] other: Patient Education: [x] Review HEP [] Progressed/Changed HEP based on:  
[] positioning   [x] body mechanics   [] transfers   [] heat/ice application   
[] other: FOTO score:  67 points Pain Level (0-10 scale) post treatment: 0 
 
ASSESSMENT/Changes in Function: Good progression; Needs improvement with motor control and practice, but overall decreased flare ups and prepping for d/c planning. Reiterated importance of home program.  
 
Patient will continue to benefit from skilled PT services to modify and progress therapeutic interventions, address functional mobility deficits, address ROM deficits, address strength deficits, analyze and address soft tissue restrictions, analyze and cue movement patterns, analyze and modify body mechanics/ergonomics and assess and modify postural abnormalities to attain remaining goals. [x]  See Plan of Care 
[]  See progress note/recertification 
[]  See Discharge Summary Progress towards goals / Updated goals: 
Short Term Goals: To be accomplished in 3 weeks: 1.  Pt will be compliant with HEP for max progression toward all goals and return to PLOF. Evalstarted on HEP will give handout next session  
Current:compliant with HEP,doing home ex couple times a week mainly stretches  progressing 
  
2. Pt FOTO score will increase by >=10 points to show improvement in functional mobility. Eval:44/100  
Current: FOTO 67/100, goal met  
  
  
Long Term Goals: To be accomplished in 6 weeks: 1.  Pt will be independant with HEP for continued and ongoing progression following discharge toward full functional mobility. Eval:started on HEP  
Current: compliant with HEP, independence progressing 
  2. Pt pain and overall subjective function will improve >= 80% to allow pt return to PLOF. Eval: pain at occurrence high up to 8-9/10 but recent with injection 1-2/10 at most 6/10 , pt reports hesitancy with increase walking standing  due to pain Current: pain average 0-2/10 with ADL, goal met 
  
3. Pt FOTO score will increase by >=23 points to show improvement in functional mobility. Eval:44/100  
Current: 67/100, goal met  
  
4. Pt strength will improve to 5 to allow pt to return to PLOF no limit with walking and work standing bending lifting activities  
Eval:                 Strength (1-5) Hip Left Right Flexion 5 4 to 4+ Extension      
Abduction 5 5 Adduction 5 5 ER 4- 4 IR 5 4+  
  
Current: NA 
  
5. Pt flexibility  will improve with knee <10 cm from table with kirt , heel to buttock in prone knee flexion and IT band foot past table level in carlos noting improve mobility without restriction  
Eval:kirt knee 20 cm at rest , 15 with overpressure from table , IT band mild tight foot to table level in carlos  
Current: Today : Kirt at 20 cm at rest, able to get heel to buttock in prone knee flexion , carlos foot past table level without pain just stretch met for all but Amrik Estephania PLAN 
[]  Upgrade activities as tolerated     []  Continue plan of care 
[]  Update interventions per flow sheet      
[]  Discharge due to:_ 
 [x]  Other: D/C Planning Jose Chung DPT, OCS 1/22/2019  10:16 AM 
 
Future Appointments Date Time Provider Chris Liz 1/22/2019  9:00 AM Royer King DPT [address] [facility]  
1/24/2019  1:30 PM Davon Gaytan [facility]

## 2019-01-24 ENCOUNTER — HOSPITAL ENCOUNTER (OUTPATIENT)
Dept: PHYSICAL THERAPY | Age: 49
Discharge: HOME | End: 2019-01-24
Payer: OTHER GOVERNMENT

## 2019-01-24 PROCEDURE — 97110 THERAPEUTIC EXERCISES: CPT

## 2019-01-24 PROCEDURE — 97530 THERAPEUTIC ACTIVITIES: CPT

## 2019-01-24 NOTE — PROGRESS NOTES
PT DAILY TREATMENT NOTE  Patient Name: Shyla Soriano Date:2019 : 1970 [x]  Patient  Verified Payor: HAKAN / Plan: Prema Valencia / Product Type: Coye Later / In time:1035  Out time:11 Total Treatment Time (min): 25 Visit #: 12 of 12 Treatment Area: Right hip pain [M25.551] SUBJECTIVE Pain Level (0-10 scale): 0 Any medication changes, allergies to medications, adverse drug reactions, diagnosis change, or new procedure performed?: [x] No    [] Yes (see summary sheet for update) Subjective functional status/changes:   [x] No changes reported Overall improvement 80%, still some issues with my sciatic nervefd OBJECTIVE 10 min Therapeutic Exercise:  [x] See flow sheet Rationale: increase strength and improve balance to improve the patients ability to tolerate WB activities. 15 min therapeutic Activity : reevaluation With 
 [x] TE 
 [] TA 
 [] neuro 
 [] other: Patient Education: [x] Review HEP [] Progressed/Changed HEP based on:  
[] positioning   [x] body mechanics   [] transfers   [] heat/ice application   
[] other: FOTO score:   
67 points Eric Pike right, mildly (+), no pain, residual mild tightness Pain Level (0-10 scale) post treatment: 0 [x]  See Discharge Summary Progress towards goals / Updated goals: 
Short Term Goals: To be accomplished in 3 weeks: 1.  Pt will be compliant with HEP for max progression toward all goals and return to PLOF. Evalstarted on HEP will give handout next session  
Current:compliant with HEP,doing home ex couple times a week mainly stretches  Progressing Status at d/c: independent with HEP 
  
2. Pt FOTO score will increase by >=10 points to show improvement in functional mobility. Eval:44/100  
Current: FOTO 67/100, goal met  
  
  
Long Term Goals: To be accomplished in 6 weeks: 1.  Pt will be independant with HEP for continued and ongoing progression following discharge toward full functional mobility. Eval:started on HEP  
Current: compliant with HEP, independence progressing Status at d/c: independent with advanced HEP,  Goal met 
  2. Pt pain and overall subjective function will improve >= 80% to allow pt return to PLOF. Eval: pain at occurrence high up to 8-9/10 but recent with injection 1-2/10 at most 6/10 , pt reports hesitancy with increase walking standing  due to pain Current: pain average 0-2/10 with ADL, goal met 
  
3. Pt FOTO score will increase by >=23 points to show improvement in functional mobility. Eval:44/100  
Current: 67/100, goal met  
  
4. Pt strength will improve to 5 to allow pt to return to PLOF no limit with walking and work standing bending lifting activities  
Eval:                 Strength (1-5) Hip Left Right Flexion 5 4 to 4+ Extension      
Abduction 5 5 Adduction 5 5 ER 4- 4 IR 5 4+  
  
Status at d/c: returned to PLOF and community ambulation, doing fine with standing , goal met 
  
5. Pt flexibility  will improve with knee <10 cm from table with kirt , heel to buttock in prone knee flexion and IT band foot past table level in carlos noting improve mobility without restriction  
Eval:kirt knee 20 cm at rest , 15 with overpressure from table , IT band mild tight foot to table level in carlos  
Current: Today : Kirt at 20 cm at rest, able to get heel to buttock in prone knee flexion , carlos foot past table level without pain just stretch met for all but Huma Simpson 
Status at d/c: Akiia Carlenei mild residual restriction right hip (15cm) with Marthenia Bicalliei test , full quad flexibility, (-) ITB pain, goal partially met PLAN [x]  Discharge due to:essential goals being met, independent with HEP_ 
[]  Other: D/C Planning Hakan Padilla, PT, OCS 1/24/2019  10:16 AM 
 
No future appointments.

## 2019-01-24 NOTE — PROGRESS NOTES
[facility] 
[address] [facility] Ph [phone]  fx [phone] Physical Therapy Discharge Summary Physical Therapy Progress Note Patient name: Jhoana [facility] of Care: 11/29/2018 Referral source: Sita Vega Abler* DTW: 48/42/[id number]               
Medical Diagnosis: Right hip pain [M25.551] 
  Onset Date:sept 2018   
Treatment Diagnosis: right hip pain   
Prior Hospitalization: see medical history Provider#: [id number] Medications: Verified on Patient summary List  
 Comorbidities: arthritis , prior hx cancer  
 Prior Level of Function: no limitation  
 
 
 
 
Visits from Start of Care: 12    Missed Visits: 1 Reporting Period : 12/21/18 to 1/24/19 Summary of Care: 
 
Progress towards goals / Updated goals: 
Short Term Goals: To be accomplished in 3 weeks: 1.  Pt will be compliant with HEP for max progression toward all goals and return to PLOF. Evalstarted on HEP will give handout next session  
Current:compliant with HEP,doing home ex couple times a week mainly stretches  Progressing Status at d/c: independent with HEP 
  
2. Pt FOTO score will increase by >=10 points to show improvement in functional mobility. Eval:44/100  
Current: FOTO 67/100, goal met  
  
  
Long Term Goals: To be accomplished in 6 weeks: 1.  Pt will be independant with HEP for continued and ongoing progression following discharge toward full functional mobility. Eval:started on HEP  
Current: compliant with HEP, independence progressing Status at d/c: independent with advanced HEP,  Goal met 
  2. Pt pain and overall subjective function will improve >= 80% to allow pt return to PLOF. Eval: pain at occurrence high up to 8-9/10 but recent with injection 1-2/10 at most 6/10 , pt reports hesitancy with increase walking standing  due to pain Current: pain average 0-2/10 with ADL, goal met 
  
3.  Pt FOTO score will increase by >=23 points to show improvement in functional mobility. Eval:44/100  
Current: 67/100, goal met  
  
4. Pt strength will improve to 5 to allow pt to return to PLOF no limit with walking and work standing bending lifting activities  
Eval:                 Strength (1-5) Hip Left Right Flexion 5 4 to 4+ Extension      
Abduction 5 5 Adduction 5 5 ER 4- 4 IR 5 4+  
  
Status at d/c: returned to PLOF and community ambulation, doing fine with standing , goal met 
  
5. Pt flexibility  will improve with knee <10 cm from table with kirt , heel to buttock in prone knee flexion and IT band foot past table level in carlos noting improve mobility without restriction  
Eval:kirt knee 20 cm at rest , 15 with overpressure from table , IT band mild tight foot to table level in carlos  
Current: Today : Kirt at 20 cm at rest, able to get heel to buttock in prone knee flexion , carlos foot past table level without pain just stretch met for all but Eric Pike 
Status at d/c: Eric Pike mild residual restriction right hip (15cm) with Eric Saner test , full quad flexibility, (-) ITB pain, goal partially met ASSESSMENT/RECOMMENDATIONS:Mrs. Jamari May reports overall 80% improvement and has met all except LTG #5 due to residual flexibility deficits. She has made nice gains in function, pain levels and is independent with an advanced HEP. I recommend d/c to HEP. [x]Discontinue therapy: [x]Patient has reached or is progressing toward set goals []Patient is non-compliant or has abdicated 
    []Due to lack of appreciable progress towards set goals Donnie Mathur, PT 1/24/2019 11:09 AM

## 2019-05-07 ENCOUNTER — HOSPITAL ENCOUNTER (OUTPATIENT)
Dept: PHYSICAL THERAPY | Age: 49
Discharge: HOME | End: 2019-05-07
Payer: OTHER GOVERNMENT

## 2019-05-07 PROCEDURE — 97162 PT EVAL MOD COMPLEX 30 MIN: CPT

## 2019-05-07 PROCEDURE — 97110 THERAPEUTIC EXERCISES: CPT

## 2019-05-07 NOTE — PROGRESS NOTES
[facility]  [address], [address] [facility]  Ph [phone]  Fx [phone]    Plan of Care/ Statement of Necessity for Physical Therapy Services    Patient name: Juvenal Ponce Start of Care: 2019   Referral source: Yola Batista : 1970    Medical Diagnosis: right hip pain   Onset Date:chronic    Treatment Diagnosis: Right hip pain [M25.551]   Prior Hospitalization: see medical history Provider#: [id number]   Medications: Verified on Patient summary List    Comorbidities: arthritis   Prior Level of Function: independent, no AD      The Plan of Care and following information is based on the information from the initial evaluation. Assessment/ key information: 47 yo female who presents to [facility] with c/o right hip and right LE pain. Patient reports pain is worse with prolonged ambulation and prolonged standing. Patient reports she uses over the counter anti inflammatories and hot baths to relieve the pain. Patient has decreased strength and flexibility in bilateral LEs, impaired gait pattern and pain in right LE. Patient would benefit from skilled PT intervention to address the above deficits. Patient will continue to benefit from skilled PT services to modify and progress therapeutic interventions, address functional mobility deficits, address ROM deficits, address strength deficits, analyze and address soft tissue restrictions, analyze and cue movement patterns, analyze and modify body mechanics/ergonomics and assess and modify postural abnormalities to attain remaining goals.      Evaluation Complexity History MEDIUM  Complexity : 1-2 comorbidities / personal factors will impact the outcome/ POC ; Examination MEDIUM Complexity : 3 Standardized tests and measures addressing body structure, function, activity limitation and / or participation in recreation  ;Presentation MEDIUM Complexity : Evolving with changing characteristics ;Clinical Decision Making MEDIUM Complexity : FOTO score of 26-74  Overall Complexity Rating: MEDIUM  Problem List: pain affecting function, decrease ROM, decrease strength, impaired gait/ balance, decrease ADL/ functional abilitiies, decrease activity tolerance and decrease flexibility/ joint mobility   Treatment Plan may include any combination of the following: Therapeutic exercise, Therapeutic activities, Neuromuscular re-education, Physical agent/modality, Gait/balance training, Manual therapy, Patient education, Functional mobility training and Stair training  Patient / Family readiness to learn indicated by: asking questions, trying to perform skills and interest  Persons(s) to be included in education: patient (P)  Barriers to Learning/Limitations: None  Measures taken if barriers to learning: NA  Patient Goal (s): I'm trying to get this pain out of my hip  Patient Self Reported Health Status: fair  Rehabilitation Potential: good    Short Term Goals: To be accomplished in 3 weeks:  1. Patient will be compliant with HEP to progress toward goals and restore functional mobility. Eval Status: Issued at [address]  2. Patient will improve FOTO score by 8 points to improve functional tolerance for exercise. Eval Status: FOTO 42     3. Patient will improve pain in right hip to 3/10 at worst to improve ambulation tolerance and restore prior level of function. Eval Status: 6/10 at worst     Long Term Goals: To be accomplished in 6 weeks:  1. Patient will be independent with HEP to progress toward goals of stair negotiation without pain. Eval Status: issued at [address]  2. Patient will improve FOTO score by 15 points to improve functional tolerance for return to previous level of exercise. Eval Status: FOTO 42     3. Patient will have 5/5 bilateral LE strength to return to goals of return to previous level of exercise.   Eval Status:   Hip L (1-5) R (1-5)   Hip Flexion 4+/5 4-/5   Hip Ext 4/5 4-/5*   Hip ABD 4/5 4-/5*   Hip ADD 4/5 4-/5   Hip ER       Hip IR          Knee L (1-5) R (1-5)   Knee Flexion 4+/5 4/5   Knee Extension 4+/5 4/5   Ankle PF       Ankle DF       Other                Frequency / Duration: Patient to be seen 2 times per week for 6 weeks. Patient/ Caregiver education and instruction: Diagnosis, prognosis, activity modification and exercises   [x]  Plan of care has been reviewed with PTA    Certification Period: 5/7/19-8/6/19  Poornima Lowry, PT 5/7/2019 11:43 AM    ________________________________________________________________________    I certify that the above Therapy Services are being furnished while the patient is under my care. I agree with the treatment plan and certify that this therapy is necessary.     Physician's Signature:_____________________Date:____________TIME:________    Eulalia Colon, Date and Time must be completed for valid certification **  Please sign and return to [facility] at [facility]  [address], [address] [facility]  Ph [phone]  Fx [phone]

## 2019-05-07 NOTE — PROGRESS NOTES
PT DAILY TREATMENT NOTE/Hip/Knee/Ankle EVAL 10-18    Patient Name: Nafisa Gaytan  Date:2019  : 1970  [x]  Patient  Verified  Payor:  / Plan: Anthony Rahman 72 / Product Type:  /    In time:1010  Out time:1100  Total Treatment Time (min): 50  Visit #: 1 of 12    Medicare/BCBS Only   Total Timed Codes (min):  NA 1:1 Treatment Time:  NA       Treatment Area: Right hip pain [M25.551]    SUBJECTIVE  Pain Level (0-10 scale): 3/10  []constant [x]intermittent []improving []worsening []no change since onset    Any medication changes, allergies to medications, adverse drug reactions, diagnosis change, or new procedure performed?: [x] No    [] Yes (see summary sheet for update)  Subjective functional status/changes:     PLOF: independent, no AD  Limitations to PLOF: pain in right hip, bilateral LE weakness right>left   Mechanism of Injury: no specific mechanism of injury, pain in right hip that is aggrevated by prolonged ambulation  Current symptoms/Complaints: pain 6/10 in right hip at worst, 3/10 today and 1/10 at best, worse with prolonged ambulation, unable to sleep on right side, if she roll to right side she is woken by pain  Previous Treatment/Compliance: two injections in right hip at greater trochanter/bursa, hot baths, no hot pack or ice packs, anti inflammatories OTC  PMHx/Surgical Hx: arthritis  Work Hx:  at [employer], full time  Living Situation: with  who is currently in [place], single story home with 3 GEOVANNA with hand rails  Pt Goals: \"I'm trying to get this pain out of my hip\"  Barriers: [x]pain []financial []time []transportation []other  Motivation: good  Substance use: []Alcohol []Tobacco []other:   Cognition: A & O x 3    Other:    OBJECTIVE    10 min [x]Eval                  []Re-Eval       40 min Therapeutic Exercise:  [] See flow sheet :   Rationale: increase ROM and increase strength to improve the patients ability to restore previous level of physical activity            With   [x] TE   [] TA   [] neuro   [] other: Patient Education: [x] Review HEP    [] Progressed/Changed HEP based on:   [] positioning   [] body mechanics   [] transfers   [] heat/ice application    [] other:        General Evaluation    Gait: antalgic on right LE, trendelenburg on right LE  Posture: forward head, rounded shoulder, kyphotic  Palpation/Sensation: tenderness over right greater trochanter and right lateral tibiofemoral joint line    ROM:                                                                        AROM        Lumbar Left Right   Ext -50% *     Flex -75%*     Side bend 50%* 50%   rotation 75% 75%     Strength (MMT):                                            Hip L (1-5) R (1-5)   Hip Flexion 4+/5 4-/5   Hip Ext 4/5 4-/5*   Hip ABD 4/5 4-/5*   Hip ADD 4/5 4-/5   Hip ER     Hip IR       Knee L (1-5) R (1-5)   Knee Flexion 4+/5 4/5   Knee Extension 4+/5 4/5   Ankle PF     Ankle DF     Other         Special Tests:      Hip Left Right   Wayne Test + +   Radha An - +   Scours - -   Kitr - +            Other Tests / Comments: tender to palpation over right greater trochanter and over right tibiofemoral joint line     Pain Level (0-10 scale) post treatment: 2/10    ASSESSMENT/Changes in Function: 45 yo female who presents to [facility] with c/o right hip and right LE pain. Patient reports pain is worse with prolonged ambulation and prolonged standing. Patient reports she uses over the counter anti inflammatories and hot baths to relieve the pain. Patient has decreased strength and flexibility in bilateral LEs, impaired gait pattern and pain in right LE. Patient would benefit from skilled PT intervention to address the above deficits.      Patient will continue to benefit from skilled PT services to modify and progress therapeutic interventions, address functional mobility deficits, address ROM deficits, address strength deficits, analyze and address soft tissue restrictions, analyze and cue movement patterns, analyze and modify body mechanics/ergonomics and assess and modify postural abnormalities to attain remaining goals. [x]  See Plan of Care  []  See progress note/recertification  []  See Discharge Summary         Progress towards goals / Updated goals:    Short Term Goals: To be accomplished in 3 weeks:  1. Patient will be compliant with HEP to progress toward goals and restore functional mobility. Eval Status: Issued at [facility]    2. Patient will improve FOTO score by 8 points to improve functional tolerance for exercise. Eval Status: FOTO 42    3. Patient will improve pain in right hip to 3/10 at worst to improve ambulation tolerance and restore prior level of function. Eval Status: 6/10 at worst    Long Term Goals: To be accomplished in 6 weeks:  1. Patient will be independent with HEP to progress toward goals of stair negotiation without pain. Eval Status: issued at [facility]    2. Patient will improve FOTO score by 15 points to improve functional tolerance for return to previous level of exercise. Eval Status: FOTO 42    3. Patient will have 5/5 bilateral LE strength to return to goals of return to previous level of exercise.   Eval Status:   Hip L (1-5) R (1-5)   Hip Flexion 4+/5 4-/5   Hip Ext 4/5 4-/5*   Hip ABD 4/5 4-/5*   Hip ADD 4/5 4-/5   Hip ER     Hip IR       Knee L (1-5) R (1-5)   Knee Flexion 4+/5 4/5   Knee Extension 4+/5 4/5   Ankle PF     Ankle DF     Other           PLAN  [x]  Upgrade activities as tolerated     [x]  Continue plan of care  []  Update interventions per flow sheet       []  Discharge due to:_  []  Other:_      Poornima Lowry, PT 5/7/2019  10:11 AM

## 2019-05-08 ENCOUNTER — APPOINTMENT (OUTPATIENT)
Dept: PHYSICAL THERAPY | Age: 49
End: 2019-05-08
Payer: OTHER GOVERNMENT

## 2019-05-14 ENCOUNTER — HOSPITAL ENCOUNTER (OUTPATIENT)
Dept: PHYSICAL THERAPY | Age: 49
Discharge: HOME | End: 2019-05-14
Payer: OTHER GOVERNMENT

## 2019-05-14 PROCEDURE — 97110 THERAPEUTIC EXERCISES: CPT

## 2019-05-14 NOTE — PROGRESS NOTES
PT DAILY TREATMENT NOTE    Patient Name: Juvenal Ponce  Date:2019  : 1970  [x]  Patient  Verified  Payor:  / Plan: Anthony Rahman 74 / Product Type:  /    In time:1055  Out time:1140  Total Treatment Time (min): 45  Total Timed Codes (min): NA  1:1 Treatment Time ( only): NA   Visit #: 2 of 12    Treatment Area: Right hip pain [M25.551]    SUBJECTIVE  Pain Level (0-10 scale): 4/10  Any medication changes, allergies to medications, adverse drug reactions, diagnosis change, or new procedure performed?: [x] No    [] Yes (see summary sheet for update)  Subjective functional status/changes:   [] No changes reported  Patient reports she is sore from her car ride this weekend. OBJECTIVE      45 min Therapeutic Exercise:  [] See flow sheet :   Rationale: increase ROM, increase strength, improve coordination, improve balance and increase proprioception to improve the patients ability to restore prior level of function            With   [x] TE   [] TA   [] neuro   [] other: Patient Education: [x] Review HEP    [] Progressed/Changed HEP based on:   [] positioning   [] body mechanics   [] transfers   [] heat/ice application    [] other:        Pain Level (0-10 scale) post treatment: 0/10    ASSESSMENT/Changes in Function: Patient tolerated treatment session very well today. Patient with improved hip mobility and decreased pain following treatment session. Patient will continue to benefit from skilled PT services to modify and progress therapeutic interventions, address functional mobility deficits, address ROM deficits, address strength deficits, analyze and address soft tissue restrictions, analyze and cue movement patterns, analyze and modify body mechanics/ergonomics and assess and modify postural abnormalities to attain remaining goals.      [x]  See Plan of Care  []  See progress note/recertification  []  See Discharge Summary         Progress towards goals / Updated goals:  Short Term Goals: To be accomplished in 3 weeks:  1. Patient will be compliant with HEP to progress toward goals and restore functional mobility. Eval Status: Issued at [facility]  Current: completing HEP daily, 5/14/19     2. Patient will improve FOTO score by 8 points to improve functional tolerance for exercise. Eval Status: FOTO 42     3. Patient will improve pain in right hip to 3/10 at worst to improve ambulation tolerance and restore prior level of function. Eval Status: 6/10 at worst     Long Term Goals: To be accomplished in 6 weeks:  1. 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 Eval Status: issued at [facility]     2. Patient will improve FOTO score by 15 points to improve functional tolerance for return to previous level of exercise. Eval Status: FOTO 42     3. Patient will have 5/5 bilateral LE strength to return to goals of return to previous level of exercise.   Eval Status:   Hip L (1-5) R (1-5)   Hip Flexion 4+/5 4-/5   Hip Ext 4/5 4-/5*   Hip ABD 4/5 4-/5*   Hip ADD 4/5 4-/5   Hip ER       Hip IR          Knee L (1-5) R (1-5)   Knee Flexion 4+/5 4/5   Knee Extension 4+/5 4/5   Ankle PF       Ankle DF       Other                  PLAN  [x]  Upgrade activities as tolerated     [x]  Continue plan of care  []  Update interventions per flow sheet       []  Discharge due to:_  []  Other:_      Janae Lowry PT 5/14/2019  1:14 PM    Future Appointments   Date Time Provider Chris Liz   5/15/2019 12:30 PM Bulmaro Denson, PT [facility]

## 2019-05-15 ENCOUNTER — HOSPITAL ENCOUNTER (OUTPATIENT)
Dept: PHYSICAL THERAPY | Age: 49
Discharge: HOME | End: 2019-05-15
Payer: OTHER GOVERNMENT

## 2019-05-15 PROCEDURE — 97110 THERAPEUTIC EXERCISES: CPT

## 2019-05-15 NOTE — PROGRESS NOTES
PT DAILY TREATMENT NOTE    Patient Name: Honorio Gonzales  Date:5/15/2019  : 1970  [x]  Patient  Verified  Payor: HAKAN / Plan: Anthony Rahman 74 / Product Type: [employer] /    In time:1230  Out time:1310  Total Treatment Time (min): 40  Total Timed Codes (min): 30  1:1 Treatment Time ( only): NA   Visit #: 3 of 10    Treatment Area: Right hip pain [M25.551]    SUBJECTIVE  Pain Level (0-10 scale): 3/10  Any medication changes, allergies to medications, adverse drug reactions, diagnosis change, or new procedure performed?: [x] No    [] Yes (see summary sheet for update)  Subjective functional status/changes:   [] No changes reported  Patient pleasant, reports she felt much better since last appt.     OBJECTIVE    Modalities Rationale:     decrease pain and increase tissue extensibility to improve patient's ability to restore previous level of function   min [] Estim, type/location:                                      []  att     []  unatt     []  w/US     []  w/ice    []  w/heat    min []  Mechanical Traction: type/lbs                   []  pro   []  sup   []  int   []  cont    []  before manual    []  after manual    min []  Ultrasound, settings/location:      min []  Iontophoresis w/ dexamethasone, location:                                               []  take home patch       []  in clinic   10 min []  Ice     [x]  Heat    location/position: Sidelying, right hip    min []  Vasopneumatic Device, press/temp:     min []  Other:    [x] Skin assessment post-treatment (if applicable):    [x]  intact    [x]  redness- no adverse reaction     []redness - adverse reaction:          30 min Therapeutic Exercise:  [] See flow sheet :   Rationale: increase ROM, increase strength, improve coordination, improve balance and increase proprioception to improve the patients ability to restore previous level of function              With   [x] TE   [] TA   [] neuro   [] other: Patient Education: [x] Review HEP [] Progressed/Changed HEP based on:   [] positioning   [] body mechanics   [] transfers   [] heat/ice application    [] other:      Pain Level (0-10 scale) post treatment: 0/10    ASSESSMENT/Changes in Function: Patient tolerated treatment session well today. Patient with good response to manual therapy today. Patient will continue to benefit from skilled PT services to modify and progress therapeutic interventions, address functional mobility deficits, address ROM deficits, address strength deficits, analyze and address soft tissue restrictions, analyze and cue movement patterns, analyze and modify body mechanics/ergonomics and assess and modify postural abnormalities to attain remaining goals. [x]  See Plan of Care  []  See progress note/recertification  []  See Discharge Summary         Progress towards goals / Updated goals:  Short Term Goals: To be accomplished in 3 weeks:  1. Patient will be compliant with HEP to progress toward goals and restore functional mobility. Eval Status: Issued at [facility]  Current: completing HEP daily, 5/14/19     2. Patient will improve FOTO score by 8 points to improve functional tolerance for exercise. Eval Status: FOTO 42     3. Patient will improve pain in right hip to 3/10 at worst to improve ambulation tolerance and restore prior level of function. Eval Status: 6/10 at worst  Current: 3/10 at worst, 5/15/19     Long Term Goals: To be accomplished in 6 weeks:  1. 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 Eval Status: issued at [facility]     2. Patient will improve FOTO score by 15 points to improve functional tolerance for return to previous level of exercise. Eval Status: FOTO 42     3. Patient will have 5/5 bilateral LE strength to return to goals of return to previous level of exercise.   Eval Status:   Hip L (1-5) R (1-5)   Hip Flexion 4+/5 4-/5   Hip Ext 4/5 4-/5*   Hip ABD 4/5 4-/5*   Hip ADD 4/5 4-/5   Hip ER       Hip IR          Knee L (1-5) R (1-5)   Knee Flexion 4+/5 4/5   Knee Extension 4+/5 4/5   Ankle PF       Ankle DF       Other               PLAN  [x]  Upgrade activities as tolerated     [x]  Continue plan of care  []  Update interventions per flow sheet       []  Discharge due to:_  []  Other:_      [id number] [facility], PT 5/15/2019  3:04 PM    No future appointments.

## 2019-05-22 ENCOUNTER — HOSPITAL ENCOUNTER (OUTPATIENT)
Dept: PHYSICAL THERAPY | Age: 49
Discharge: HOME | End: 2019-05-22
Payer: OTHER GOVERNMENT

## 2019-05-22 PROCEDURE — 97110 THERAPEUTIC EXERCISES: CPT

## 2019-05-22 NOTE — PROGRESS NOTES
PT DAILY TREATMENT NOTE    Patient Name: Nestor Lagos  Date:2019  : 1970  [x]  Patient  Verified  Payor:  / Plan: Anthony Rahman 74 / Product Type:  /    In time:1000  Out time:1048  Total Treatment Time (min): 48  Total Timed Codes (min): 38  1:1 Treatment Time ( only): NA   Visit #: 4 of 12    Treatment Area: Right hip pain [M25.551]    SUBJECTIVE  Pain Level (0-10 scale): 3/10  Any medication changes, allergies to medications, adverse drug reactions, diagnosis change, or new procedure performed?: [x] No    [] Yes (see summary sheet for update)  Subjective functional status/changes:   [] No changes reported  Patient reports she mowed her lawn yesterday and is a bit more sore today.     OBJECTIVE    Modalities Rationale:     decrease pain and increase tissue extensibility to improve patient's ability to restore PLOF   min [] Estim, type/location:                                      []  att     []  unatt     []  w/US     []  w/ice    []  w/heat    min []  Mechanical Traction: type/lbs                   []  pro   []  sup   []  int   []  cont    []  before manual    []  after manual    min []  Ultrasound, settings/location:      min []  Iontophoresis w/ dexamethasone, location:                                               []  take home patch       []  in clinic   10 min []  Ice     [x]  Heat    location/position: Sidelying, right hip    min []  Vasopneumatic Device, press/temp:     min []  Other:    [x] Skin assessment post-treatment (if applicable):    [x]  intact    [x]  redness- no adverse reaction     []redness - adverse reaction:          38 min Therapeutic Exercise:  [x] See flow sheet :   Rationale: increase ROM, increase strength and improve balance to improve the patients ability to restore PLOF              With   [x] TE   [] TA   [] neuro   [] other: Patient Education: [x] Review HEP    [] Progressed/Changed HEP based on:   [] positioning   [] body mechanics   [] transfers   [] heat/ice application    [] other:            Pain Level (0-10 scale) post treatment: 0/10          ASSESSMENT/Changes in Function: Patient tolerated treatment session well today. Reports she feels as though she was more stiff today and that she feels better after then manual stretching is completed. Had no complaints with added strengthening exercises. Patient will continue to benefit from skilled PT services to modify and progress therapeutic interventions, address functional mobility deficits, address ROM deficits, address strength deficits, analyze and address soft tissue restrictions, analyze and cue movement patterns, analyze and modify body mechanics/ergonomics and assess and modify postural abnormalities to attain remaining goals. []  See Plan of Care  []  See progress note/recertification  []  See Discharge Summary         Progress towards goals / Updated goals:  Short Term Goals: To be accomplished in 3 weeks:  1. Patient will be compliant with HEP to progress toward goals and restore functional mobility. Eval Status: Issued at [facility]  Current: completing HEP daily, 5/14/19     2. Patient will improve FOTO score by 8 points to improve functional tolerance for exercise. Eval Status: FOTO 42     3. Patient will improve pain in right hip to 3/10 at worst to improve ambulation tolerance and restore prior level of function. Eval Status: 6/10 at worst  Current: 3/10 at worst, 5/15/19     Long Term Goals: To be accomplished in 6 weeks:  1. 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 Eval Status: issued at [facility]     2. Patient will improve FOTO score by 15 points to improve functional tolerance for return to previous level of exercise. Eval Status: FOTO 42     3. Patient will have 5/5 bilateral LE strength to return to goals of return to previous level of exercise.   Eval Status:   Hip L (1-5) R (1-5)   Hip Flexion 4+/5 4-/5   Hip Ext 4/5 4-/5*   Hip ABD 4/5 4-/5*   Hip ADD 4/5 4-/5   Hip ER       Hip IR          Knee L (1-5) R (1-5)   Knee Flexion 4+/5 4/5   Knee Extension 4+/5 4/5   Ankle PF       Ankle DF       Other                     PLAN  [x]  Upgrade activities as tolerated     [x]  Continue plan of care  []  Update interventions per flow sheet       []  Discharge due to:_  []  Other:_      Giovana Byrd, PT 5/22/2019  10:08 AM    No future appointments.

## 2019-05-30 ENCOUNTER — APPOINTMENT (OUTPATIENT)
Dept: PHYSICAL THERAPY | Age: 49
End: 2019-05-30
Payer: OTHER GOVERNMENT

## 2019-06-11 ENCOUNTER — APPOINTMENT (OUTPATIENT)
Dept: PHYSICAL THERAPY | Age: 49
End: 2019-06-11
Payer: OTHER GOVERNMENT

## 2019-06-12 ENCOUNTER — HOSPITAL ENCOUNTER (OUTPATIENT)
Dept: PHYSICAL THERAPY | Age: 49
Discharge: HOME | End: 2019-06-12
Payer: OTHER GOVERNMENT

## 2019-06-12 PROCEDURE — 97110 THERAPEUTIC EXERCISES: CPT

## 2019-06-12 NOTE — PROGRESS NOTES
PT DAILY TREATMENT NOTE    Patient Name: Gómez Paz  Date:2019  : 1970  [x]  Patient  Verified  Payor: HAKAN / Plan: Brian Correll / Product Type: Izabella Smart /    In time:1126  Out time:1230  Total Treatment Time (min): 64  Total Timed Codes (min): 54  1:1 Treatment Time ( only): NA   Visit #: 5 of 12    Treatment Area: Right hip pain [M25.551]    SUBJECTIVE  Pain Level (0-10 scale): 2/10  Any medication changes, allergies to medications, adverse drug reactions, diagnosis change, or new procedure performed?: [x] No    [] Yes (see summary sheet for update)  Subjective functional status/changes:   [] No changes reported  Pt pleasant, reports no new changes    OBJECTIVE    Modalities Rationale:     decrease pain and increase tissue extensibility to improve patient's ability to restore PLOF   min [] Estim, type/location:                                      []  att     []  unatt     []  w/US     []  w/ice    []  w/heat    min []  Mechanical Traction: type/lbs                   []  pro   []  sup   []  int   []  cont    []  before manual    []  after manual    min []  Ultrasound, settings/location:      min []  Iontophoresis w/ dexamethasone, location:                                               []  take home patch       []  in clinic   10 min []  Ice     [x]  Heat    location/position: Sidelying, right hip    min []  Vasopneumatic Device, press/temp:     min []  Other:    [x] Skin assessment post-treatment (if applicable):    [x]  intact    [x]  redness- no adverse reaction     []redness - adverse reaction:          54 min Therapeutic Exercise:  [x] See flow sheet :   Rationale: increase ROM and increase strength to improve the patients ability to restore PLOF      With   [x] TE   [] TA   [] neuro   [] other: Patient Education: [x] Review HEP    [] Progressed/Changed HEP based on:   [] positioning   [] body mechanics   [] transfers   [] heat/ice application    [] other:        Pain Level (0-10 scale) post treatment: 2/10    ASSESSMENT/Changes in Function: Patient tolerated treatment session well today. No complaints with manual therapy or added therex for hip strengthening. Patient will continue to benefit from skilled PT services to modify and progress therapeutic interventions, address functional mobility deficits, address ROM deficits, address strength deficits, analyze and address soft tissue restrictions, analyze and cue movement patterns, analyze and modify body mechanics/ergonomics and assess and modify postural abnormalities to attain remaining goals. [x]  See Plan of Care  []  See progress note/recertification  []  See Discharge Summary         Progress towards goals / Updated goals:  Short Term Goals: To be accomplished in 3 weeks:  1. Patient will be compliant with HEP to progress toward goals and restore functional mobility. Eval Status: Issued at [facility]  Current: completing HEP daily, 5/14/19     2. Patient will improve FOTO score by 8 points to improve functional tolerance for exercise. Eval Status: FOTO 42     3. Patient will improve pain in right hip to 3/10 at worst to improve ambulation tolerance and restore prior level of function. Eval Status: 6/10 at worst  Current: 3/10 at worst, 5/15/19     Long Term Goals: To be accomplished in 6 weeks:  1. 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 Eval Status: issued at [facility]     2. Patient will improve FOTO score by 15 points to improve functional tolerance for return to previous level of exercise. Eval Status: FOTO 42     3. Patient will have 5/5 bilateral LE strength to return to goals of return to previous level of exercise.   Eval Status:   Hip L (1-5) R (1-5)   Hip Flexion 4+/5 4-/5   Hip Ext 4/5 4-/5*   Hip ABD 4/5 4-/5*   Hip ADD 4/5 4-/5   Hip ER       Hip IR          Knee L (1-5) R (1-5)   Knee Flexion 4+/5 4/5   Knee Extension 4+/5 4/5   Ankle PF       Ankle DF       Other                        PLAN  [x]  Upgrade activities as tolerated     [x]  Continue plan of care  []  Update interventions per flow sheet       []  Discharge due to:_  []  Other:_      Sravani Hendrix PT 6/12/2019  1:31 PM    Future Appointments   Date Time Provider Chris Liz   6/14/2019  1:30 PM Eliceo Shafer PT [facility]   6/20/2019  1:00 PM Eliceo Shafer PT [facility]

## 2019-06-14 ENCOUNTER — HOSPITAL ENCOUNTER (OUTPATIENT)
Dept: PHYSICAL THERAPY | Age: 49
Discharge: HOME | End: 2019-06-14
Payer: OTHER GOVERNMENT

## 2019-06-14 PROCEDURE — 97110 THERAPEUTIC EXERCISES: CPT

## 2019-06-14 NOTE — PROGRESS NOTES
PT DAILY TREATMENT NOTE    Patient Name: Brooklyn Andrew  Date:2019  : 1970  [x]  Patient  Verified  Payor: HAKAN / Plan: [employer] / Product Type: [employer] /    In time:1320  Out time:1400  Total Treatment Time (min): 40  Total Timed Codes (min): 40  1:1 Treatment Time ( only): NA   Visit #: 6 of 12    Treatment Area: Right hip pain [M25.551]    SUBJECTIVE  Pain Level (0-10 scale): 2/10  Any medication changes, allergies to medications, adverse drug reactions, diagnosis change, or new procedure performed?: [x] No    [] Yes (see summary sheet for update)  Subjective functional status/changes:   [] No changes reported  Patient pleasant, reports she is sore today. OBJECTIVE      38 min Therapeutic Exercise:  [x] See flow sheet :   Rationale: increase ROM and increase strength to improve the patients ability to restore PLOF      With   [x] TE   [] TA   [] neuro   [] other: Patient Education: [x] Review HEP    [] Progressed/Changed HEP based on:   [] positioning   [] body mechanics   [] transfers   [] heat/ice application    [] other:        Pain Level (0-10 scale) post treatment: 0/10    ASSESSMENT/Changes in Function: Patient tolerated treatment session well today. No complaints with hip strengthening therex. Patient is making good progress toward goals. Patient will continue to benefit from skilled PT services to modify and progress therapeutic interventions, address functional mobility deficits, address ROM deficits, address strength deficits, analyze and address soft tissue restrictions, analyze and cue movement patterns, analyze and modify body mechanics/ergonomics and assess and modify postural abnormalities to attain remaining goals. [x]  See Plan of Care  []  See progress note/recertification  []  See Discharge Summary         Progress towards goals / Updated goals:    Short Term Goals: To be accomplished in 3 weeks:  1.  Patient will be compliant with HEP to progress toward goals and restore functional mobility. Eval Status: Issued at Eval  Current: completing HEP daily, 5/14/19     2. Patient will improve FOTO score by 8 points to improve functional tolerance for exercise. Eval Status: FOTO 42  Current: FOTO 45-progressing 6/14/19     3. Patient will improve pain in right hip to 3/10 at worst to improve ambulation tolerance and restore prior level of function. Eval Status: 6/10 at worst  Current: 2/10 at worst, 6/14/19      Long Term Goals: To be accomplished in 6 weeks:  1. 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 Eval Status: issued at [facility]  Current: reports compliance with new exercises added to [facility] at last visit     2. Patient will improve FOTO score by 15 points to improve functional tolerance for return to previous level of exercise. Eval Status: FOTO 42      3. Patient will have 5/5 bilateral LE strength to return to goals of return to previous level of exercise.   Eval Status:   Hip L (1-5) R (1-5)   Hip Flexion 4+/5 4-/5   Hip Ext 4/5 4-/5*   Hip ABD 4/5 4-/5*   Hip ADD 4/5 4-/5   Hip ER       Hip IR          Knee L (1-5) R (1-5)   Knee Flexion 4+/5 4/5   Knee Extension 4+/5 4/5   Ankle PF       Ankle DF       Other            PLAN  [x]  Upgrade activities as tolerated     [x]  Continue plan of care  []  Update interventions per flow sheet       []  Discharge due to:_  []  Other:_      Poornima Lowry PT 6/14/2019  1:35 PM    Future Appointments   Date Time Provider Chris Liz   6/17/2019  8:00 AM Ruddy Allred [facility]   6/20/2019  1:00 PM Maxime Lowry [facility]   6/26/2019  9:30 AM Yuan Leary [facility]   6/27/2019 10:30 AM Yuan Leary [facility]   7/1/2019  2:30 PM Yuan Leary [facility]   7/3/2019  9:30 AM Yuan Leary, PT [facility]

## 2019-06-17 ENCOUNTER — HOSPITAL ENCOUNTER (OUTPATIENT)
Dept: PHYSICAL THERAPY | Age: 49
Discharge: HOME | End: 2019-06-17
Payer: OTHER GOVERNMENT

## 2019-06-17 PROCEDURE — 97110 THERAPEUTIC EXERCISES: CPT | Performed by: PHYSICAL THERAPIST

## 2019-06-17 NOTE — PROGRESS NOTES
PT DAILY TREATMENT NOTE    Patient Name: Kate Baker  Date:2019  : 1970  [x]  Patient  Verified  Payor: HAKAN / Plan: Anthony Rahman 74 / Product Type: [employer] /    In time:930  Out time:1025  Total Treatment Time (min): 55  Total Timed Codes (min): 55  1:1 Treatment Time ( [address] only): 39   Visit #: 7 of 12    Treatment Area: Right hip pain [M25.551]    SUBJECTIVE  Pain Level (0-10 scale): 1  Any medication changes, allergies to medications, adverse drug reactions, diagnosis change, or new procedure performed?: [x] No    [] Yes (see summary sheet for update)  Subjective functional status/changes:   [x] No changes reported  Last week Thursday spasms in hip ( did a lot of walking at work for 2 days prior ) , no spasms since then   Pt states feels she is 25% better in right hip pain since start of therapy , stays on average 2/10     OBJECTIVE       55 min Therapeutic Exercise:  [x] See flow sheet :   Rationale: increase ROM and increase strength to improve the patients ability to restore PLOF      With   [] TE   [] TA   [] neuro   [] other: Patient Education: [x] Review HEP    [] Progressed/Changed HEP based on:   [] positioning   [] body mechanics   [] transfers   [] heat/ice application    [] other:      Other Objective/Functional Measures: see ex grid    SLS right 32 sec before touch , left 15 sec stop due to knee pain     Pain Level (0-10 scale) post treatment: 1    ASSESSMENT/Changes in Function: added 2# to SLR but with abd needed minimal 1 finger assist to reach end range and rest after 5reps , SLS on left limited due to knee pain ,no adverse affect with therapy     Patient will continue to benefit from skilled PT services to modify and progress therapeutic interventions, address functional mobility deficits, address ROM deficits, address strength deficits, analyze and address soft tissue restrictions, analyze and cue movement patterns, analyze and modify body mechanics/ergonomics, assess and modify postural abnormalities and address imbalance/dizziness to attain remaining goals. [x]  See Plan of Care  []  See progress note/recertification  []  See Discharge Summary         Progress towards goals / Updated goals:  Short Term Goals: To be accomplished in 3 weeks:  1. Patient will be compliant with HEP to progress toward goals and restore functional mobility. Eval Status: Issued at [facility]  Current: completing HEP 2-3 days a week around work schedule and in dept 2x wk . ( 6/17/2019 )      2. Patient will improve FOTO score by 8 points to improve functional tolerance for exercise. Eval Status: FOTO 42  Current: FOTO 45-progressing 6/14/19     3. Patient will improve pain in right hip to 3/10 at worst to improve ambulation tolerance and restore prior level of function. Eval Status: 6/10 at worst  Current: 2/10 at worst, 6/14/19      Long Term Goals: To be accomplished in 6 weeks:  1. 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 Eval Status: issued at [facility]  Current: reports compliance with new exercises added to HEP at last visit     2. Patient will improve FOTO score by 15 points to improve functional tolerance for return to previous level of exercise. Eval Status: FOTO 42    Current: FOTO 45-progressing 6/14/19    3. Patient will have 5/5 bilateral LE strength to return to goals of return to previous level of exercise.   Eval Status:   Hip L (1-5) R (1-5)   Hip Flexion 4+/5 4-/5   Hip Ext 4/5 4-/5*   Hip ABD 4/5 4-/5*   Hip ADD 4/5 4-/5   Hip ER       Hip IR          Knee L (1-5) R (1-5)   Knee Flexion 4+/5 4/5   Knee Extension 4+/5 4/5   Ankle PF       Ankle DF       Other                     PLAN  [x]  Upgrade activities as tolerated     [x]  Continue plan of care  []  Update interventions per flow sheet       []  Discharge due to:_  []  Other:_      Pravenea Oseguera, PT 6/17/2019  9:43 AM    Future Appointments   Date Time Provider Chris Liz   6/20/2019  1:00 PM Olga Helm, PT [facility]   6/26/2019  9:30 AM Bon Betancourt PT [facility]   6/27/2019 10:30 AM Bon Betancourt [facility]   7/1/2019  2:30 PM Bon Betancourt PT [facility]   7/3/2019  9:30 AM Bon Betancourt [facility]

## 2019-06-20 ENCOUNTER — APPOINTMENT (OUTPATIENT)
Dept: PHYSICAL THERAPY | Age: 49
End: 2019-06-20
Payer: OTHER GOVERNMENT

## 2019-06-21 ENCOUNTER — HOSPITAL ENCOUNTER (OUTPATIENT)
Dept: PHYSICAL THERAPY | Age: 49
Discharge: HOME | End: 2019-06-21
Payer: OTHER GOVERNMENT

## 2019-06-21 PROCEDURE — 97110 THERAPEUTIC EXERCISES: CPT

## 2019-06-21 NOTE — PROGRESS NOTES
PT DAILY TREATMENT NOTE    Patient Name: Amanda Castro  Date:2019  : 1970  [x]  Patient  Verified  Payor: HAKAN / Plan: Anthony Rahman 74 / Product Type: [employer] /    In time:1200  Out time:1253  Total Treatment Time (min): 53  Total Timed Codes (min): 53  1:1 Treatment Time ( only): NA   Visit #: 8 of 12    Treatment Area: Right hip pain [M25.551]    SUBJECTIVE  Pain Level (0-10 scale): /10  Any medication changes, allergies to medications, adverse drug reactions, diagnosis change, or new procedure performed?: [x] No    [] Yes (see summary sheet for update)  Subjective functional status/changes:   [] No changes reported  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 OBJECTIVE        53 min Therapeutic Exercise:  [x] See flow sheet :   Rationale: increase ROM and increase strength to improve the patients ability to restore PLOF            With   [x] TE   [] TA   [] neuro   [] other: Patient Education: [x] Review HEP    [] Progressed/Changed HEP based on:   [] positioning   [] body mechanics   [] transfers   [] heat/ice application    [] other:      Pain Level (0-10 scale) post treatment: 1/10    ASSESSMENT/Changes in Function: Patient tolerated treatment well today. No complaints with added therex for hip strengthening and stabilization. Patient will continue to benefit from skilled PT services to modify and progress therapeutic interventions, address functional mobility deficits, address ROM deficits, address strength deficits, analyze and address soft tissue restrictions, analyze and cue movement patterns, analyze and modify body mechanics/ergonomics and assess and modify postural abnormalities to attain remaining goals. [x]  See Plan of Care  []  See progress note/recertification  []  See Discharge Summary         Progress towards goals / Updated goals:  Short Term Goals: To be accomplished in 3 weeks:  1.  Patient will be compliant with HEP to progress toward goals and restore functional mobility. Eval Status: Issued at [facility]  Current: completing HEP 2-3 days a week around work schedule and in dept 2x wk . ( 6/17/2019 )      2. Patient will improve FOTO score by 8 points to improve functional tolerance for exercise. Eval Status: FOTO 42  Current: FOTO 45-progressing 6/14/19     3. Patient will improve pain in right hip to 3/10 at worst to improve ambulation tolerance and restore prior level of function. Eval Status: 6/10 at worst  Current: 2/10 at [address] be accomplished in 6 weeks:  1. 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 Eval Status: issued at [facility]  Current: reports compliance with new exercises added to HEP at last visit     2. Patient will improve FOTO score by 15 points to improve functional tolerance for return to previous level of exercise. Eval Status: FOTO 42    Current: FOTO 45-progressing 6/14/19     3. Patient will have 5/5 bilateral LE strength to return to goals of return to previous level of exercise.   Eval Status:   Hip L (1-5) R (1-5)   Hip Flexion 4+/5 4-/5   Hip Ext 4/5 4-/5*   Hip ABD 4/5 4-/5*   Hip ADD 4/5 4-/5   Hip ER       Hip IR          Knee L (1-5) R (1-5)   Knee Flexion 4+/5 4/5   Knee Extension 4+/5 4/5   Ankle PF       Ankle DF       Other               PLAN  [x]  Upgrade activities as tolerated     [x]  Continue plan of care  []  Update interventions per flow sheet       []  Discharge due to:_  []  Other:_      Poornima Lowry PT 6/21/2019  12:50 PM    Future Appointments   Date Time Provider Chris Liz   6/26/2019  6:00 PM Sabina Padron [facility]   6/27/2019  9:00 AM Alfred Lowry, PT [facility]   7/2/2019  8:00 AM Alfred Lowry PT [facility]   7/3/2019  4:30 PM Michelle Brower, PT [facility]

## 2019-06-26 ENCOUNTER — HOSPITAL ENCOUNTER (OUTPATIENT)
Dept: PHYSICAL THERAPY | Age: 49
Discharge: HOME | End: 2019-06-26
Payer: OTHER GOVERNMENT

## 2019-06-26 PROCEDURE — 97110 THERAPEUTIC EXERCISES: CPT

## 2019-06-26 NOTE — PROGRESS NOTES
PT DAILY TREATMENT NOTE    Patient Name: Hannah Hernandez  Date:2019  : 1970  [x]  Patient  Verified  Payor: HAKAN / Plan: Anthony Rahman 74 / Product Type: Cathlene Pares /    In time:1750  Out time:1850  Total Treatment Time (min): 60  Total Timed Codes (min): 55  1:1 Treatment Time ( only): NA   Visit #: 9 of 12    Treatment Area: Right hip pain [M25.551]    SUBJECTIVE  Pain Level (0-10 scale): 310  Any medication changes, allergies to medications, adverse drug reactions, diagnosis change, or new procedure performed?: [x] No    [] Yes (see summary sheet for update)  Subjective functional status/changes:   [] No changes reported  Patient pleasant, reports she had a fall at home. OBJECTIVE      55 min Therapeutic Exercise:  [x] See flow sheet :   Rationale: increase ROM and increase strength to improve the patients ability to restore PLOF            With   [x] TE   [] TA   [] neuro   [] other: Patient Education: [x] Review HEP    [] Progressed/Changed HEP based on:   [] positioning   [] body mechanics   [] transfers   [] heat/ice application    [] other:      Pain Level (0-10 scale) post treatment: 3/10    ASSESSMENT/Changes in Function: Patient tolerated treatment session well today. Patient with no complaints with hip strengthening therex today. Patient will continue to benefit from skilled PT services to modify and progress therapeutic interventions, address functional mobility deficits, address ROM deficits, address strength deficits, analyze and address soft tissue restrictions, analyze and cue movement patterns, analyze and modify body mechanics/ergonomics and assess and modify postural abnormalities to attain remaining goals. [x]  See Plan of Care  []  See progress note/recertification  []  See Discharge Summary         Progress towards goals / Updated goals:  Short Term Goals: To be accomplished in 3 weeks:  1.  Patient will be compliant with HEP to progress toward goals and restore functional mobility. Eval Status: Issued at [facility]  Current: completing HEP 2-3 days a week around work schedule and in dept 2x wk . ( 6/17/2019 )      2. Patient will improve FOTO score by 8 points to improve functional tolerance for exercise. Eval Status: FOTO 42  Current: FOTO 45-progressing 6/14/19     3. Patient will improve pain in right hip to 3/10 at worst to improve ambulation tolerance and restore prior level of function. Eval Status: 6/10 at worst  Current: 2/10 at [address] be accomplished in 6 weeks:  1. 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 Eval Status: issued at [facility]  Current: reports compliance with new exercises added to HEP at last visit     2. Patient will improve FOTO score by 15 points to improve functional tolerance for return to previous level of exercise. Eval Status: FOTO 42    Current: FOTO 45-progressing 6/14/19     3. Patient will have 5/5 bilateral LE strength to return to goals of return to previous level of exercise.   Eval Status:   Hip L (1-5) R (1-5)   Hip Flexion 4+/5 4-/5   Hip Ext 4/5 4-/5*   Hip ABD 4/5 4-/5*   Hip ADD 4/5 4-/5   Hip ER       Hip IR          Knee L (1-5) R (1-5)   Knee Flexion 4+/5 4/5   Knee Extension 4+/5 4/5   Ankle PF       Ankle DF       Other               PLAN  [x]  Upgrade activities as tolerated     [x]  Continue plan of care  []  Update interventions per flow sheet       []  Discharge due to:_  []  Other:_      Meng Lowry PT 6/26/2019  7:00 PM    Future Appointments   Date Time Provider Chris Liz   6/27/2019  9:00 AM Yolanda Moulton PT [facility]   7/2/2019  8:00 AM Meng Lowry, PT [facility]   7/3/2019  4:30 PM Yolanda Moulton PT [facility]

## 2019-06-27 ENCOUNTER — HOSPITAL ENCOUNTER (OUTPATIENT)
Dept: PHYSICAL THERAPY | Age: 49
Discharge: HOME | End: 2019-06-27
Payer: OTHER GOVERNMENT

## 2019-06-27 PROCEDURE — 97110 THERAPEUTIC EXERCISES: CPT

## 2019-06-27 NOTE — PROGRESS NOTES
PT DAILY TREATMENT NOTE    Patient Name: Dionte Rodriguez  Date:2019  : 1970  [x]  Patient  Verified  Payor: HAKAN / Plan: Anthony Rahman 74 / Product Type: Connor [employer] /    In time:0900  Out time:0940  Total Treatment Time (min): 40  Total Timed Codes (min): 40  1:1 Treatment Time ( only): NA   Visit #: 10 of 12    Treatment Area: Right hip pain [M25.551]    SUBJECTIVE  Pain Level (0-10 scale): 0/10  Any medication changes, allergies to medications, adverse drug reactions, diagnosis change, or new procedure performed?: [x] No    [] Yes (see summary sheet for update)  Subjective functional status/changes:   [] No changes reported  Patient reports no changes, she reports she feels much better since her visit yesterday. OBJECTIVE      40 min Therapeutic Exercise:  [x] See flow sheet :   Rationale: increase ROM, increase strength and improve balance to improve the patients ability to restore PLOF. With   [x] TE   [] TA   [] neuro   [] other: Patient Education: [x] Review HEP    [] Progressed/Changed HEP based on:   [] positioning   [] body mechanics   [] transfers   [] heat/ice application    [] other:      Other Objective/Functional Measures: improved gait pattern, improved LE strength and decreased pain      Pain Level (0-10 scale) post treatment: 0/10    ASSESSMENT/Changes in Function: Patient tolerated treatment session well today. No complaints with added instability to single leg balance today. She is making good progress toward goals. Patient will continue to benefit from skilled PT services to modify and progress therapeutic interventions, address functional mobility deficits, address ROM deficits, address strength deficits, analyze and address soft tissue restrictions, analyze and cue movement patterns, analyze and modify body mechanics/ergonomics and assess and modify postural abnormalities to attain remaining goals.      [x]  See Plan of Care  []  See progress note/recertification  []  See Discharge Summary         Progress towards goals / Updated goals:  Short Term Goals: To be accomplished in 3 weeks:  1. Patient will be compliant with HEP to progress toward goals and restore functional mobility. Eval Status: Issued at [facility]  Current: completing HEP daily 6/27/19     2. Patient will improve FOTO score by 8 points to improve functional tolerance for exercise. Eval Status: FOTO 42   Current: FOTO 45-progressing 6/14/19- will report at next visit     3. Patient will improve pain in right hip to 3/10 at worst to improve ambulation tolerance and restore prior level of function. Eval Status: 6/10 at worst  Current: 2/10 at worst, 6/27/19      Long Term Goals: To be accomplished in 6 weeks:  1. 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 Eval Status: issued at [facility]  Current: reports compliance with new exercises added to HEP at last visit 6/27/19     2. Patient will improve FOTO score by 15 points to improve functional tolerance for return to previous level of exercise. Eval Status: FOTO 42    Current: FOTO 45-progressing 6/14/19 will reports at next visit     3. Patient will have 5/5 bilateral LE strength to return to goals of return to previous level of exercise.   Eval Status:   Hip L (1-5) R (1-5)   Hip Flexion 4+/5 4-/5   Hip Ext 4/5 4-/5*   Hip ABD 4/5 4-/5*   Hip ADD 4/5 4-/5   Hip ER       Hip IR          Knee L (1-5) R (1-5)   Knee Flexion 4+/5 4/5   Knee Extension 4+/5 4/5   Ankle PF       Ankle DF       Other          Current:  Hip L (1-5) R (1-5)   Hip Flexion 5/5 5/5   Hip Ext 5/5 5/5   Hip ABD 4+/5 4+/5   Hip ADD 4+/5 4+/5   Hip ER       Hip IR          Knee L (1-5) R (1-5)   Knee Flexion 5/5 5/5   Knee Extension 5/5 5/5   Ankle PF       Ankle DF       Other       Progressing 6/27/19        PLAN  [x]  Upgrade activities as tolerated     [x]  Continue plan of care  []  Update interventions per flow sheet []  Discharge due to:_  []  Other:_      Favian Walton PT 6/27/2019  9:31 AM    Future Appointments   Date Time Provider Chris Liz   7/2/2019  8:00 AM Brock President, PT [facility]   7/3/2019  4:30 PM Brock President, PT [facility]

## 2019-06-27 NOTE — PROGRESS NOTES
[facility]  [address], [address]  Ph [phone]  Fx [phone]    Physical Therapy Progress Note  Patient name: Dionte Rodriguez Start of Care: 19   Referral source: Hill Huddleston : 1970   Medical/Treatment Diagnosis: Right hip pain [M25.551] Onset Date:chronic   Prior Hospitalization: see medical history Provider#: [id number]   Medications: Verified on Patient Summary List    Comorbidities: arthritis  Prior Level of Function:independent, no AD    Visits from Start of Care: 10    Missed Visits: 0    Goals/Measure of Progress:    Short Term Goals: To be accomplished in 3 weeks:  1. Patient will be compliant with HEP to progress toward goals and restore functional mobility. Eval Status: Issued at [facility]  Current: completing HEP daily 19     2. Patient will improve FOTO score by 8 points to improve functional tolerance for exercise. Eval Status: FOTO 42   Current: FOTO 45-progressing 19- will report at next visit     3. Patient will improve pain in right hip to 3/10 at worst to improve ambulation tolerance and restore prior level of function. Eval Status: 6/10 at worst  Current: 2/10 at worst, 19      Long Term Goals: To be accomplished in 6 weeks:  1. 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 Eval Status: issued at [facility]  Current: reports compliance with new exercises added to HEP at last visit 19     2. Patient will improve FOTO score by 15 points to improve functional tolerance for return to previous level of exercise. Eval Status: FOTO 42    Current: FOTO 45-progressing 19 will reports at next visit     3. Patient will have 5/5 bilateral LE strength to return to goals of return to previous level of exercise.   Eval Status:   Hip L (1-5) R (1-5)   Hip Flexion 4+/5 4-/5   Hip Ext 4/5 4-/5*   Hip ABD 4/5 4-/5*   Hip ADD 4/5 4-/5   Hip ER       Hip IR          Knee L (1-5) R (1-5)   Knee Flexion 4+/5 4/5   Knee Extension 4+/5 4/5   Ankle PF       Ankle DF       Other          Current:  Hip L (1-5) R (1-5)   Hip Flexion 5/5 5/5   Hip Ext 5/5 5/5   Hip ABD 4+/5 4+/5   Hip ADD 4+/5 4+/5   Hip ER       Hip IR          Knee L (1-5) R (1-5)   Knee Flexion 5/5 5/5   Knee Extension 5/5 5/5   Ankle PF       Ankle DF       Other       Progressing 6/27/19             Key Functional Changes: improved LE strength, improved pain, improved functional mobility tolerance  Updated Goals:  To be achieved in 2 weeks:        ASSESSMENT/RECOMMENDATIONS:  [x]Continue therapy per initial plan/protocol at a frequency of  2 x per week for 2 weeks  []Continue therapy with the following recommended changes:_____________________ _____________________________ ________________________________________  []Discontinue therapy progressing towards or have reached established goals  []Discontinue therapy due to lack of appreciable progress towards goals  []Discontinue therapy due to lack of attendance or compliance  []Await Physician's recommendations/decisions regarding therapy  []Other:________________________________________________________________    Thank you for this referral.   Poornima Lowry, PT 6/27/2019 10:51 AM    NOTE TO PHYSICIAN:  [address]   FAX TO [facility]: [phone]  If you are unable to process this request in 24 hours please contact our office: [phone]      ____ I have read the above report and request that my patient continue therapy with the following changes/special instructions:  ____ I have read the above report and request that my patient be discharged from therapy    Physician's Signature:____________Date:_________TIME:________    ** Signature, Date and Time must be completed for valid certification **

## 2019-06-29 ENCOUNTER — HOSPITAL ENCOUNTER (EMERGENCY)
Age: 49
Discharge: HOME | End: 2019-06-29
Attending: EMERGENCY MEDICINE
Payer: OTHER GOVERNMENT

## 2019-06-29 ENCOUNTER — HOSPITAL ENCOUNTER (EMERGENCY)
Dept: CT IMAGING | Age: 49
Discharge: HOME | End: 2019-06-29
Attending: EMERGENCY MEDICINE
Payer: OTHER GOVERNMENT

## 2019-06-29 VITALS
HEART RATE: 70 BPM | RESPIRATION RATE: 18 BRPM | SYSTOLIC BLOOD PRESSURE: 127 MMHG | TEMPERATURE: 97.9 F | DIASTOLIC BLOOD PRESSURE: 62 MMHG | WEIGHT: 178 LBS | BODY MASS INDEX: 26.98 KG/M2 | HEIGHT: 68 IN | OXYGEN SATURATION: 100 %

## 2019-06-29 DIAGNOSIS — M54.50 ACUTE MIDLINE LOW BACK PAIN WITHOUT SCIATICA: Primary | ICD-10-CM

## 2019-06-29 DIAGNOSIS — R31.9 HEMATURIA, UNSPECIFIED TYPE: ICD-10-CM

## 2019-06-29 LAB
APPEARANCE UR: CLEAR
BACTERIA URNS QL MICRO: ABNORMAL /HPF
BILIRUB UR QL: NEGATIVE
COLOR UR: YELLOW
EPITH CASTS URNS QL MICRO: ABNORMAL /LPF (ref 0–5)
GLUCOSE UR STRIP.AUTO-MCNC: NEGATIVE MG/DL
HCG UR QL: NEGATIVE
HGB UR QL STRIP: ABNORMAL
KETONES UR QL STRIP.AUTO: NEGATIVE MG/DL
LEUKOCYTE ESTERASE UR QL STRIP.AUTO: NEGATIVE
NITRITE UR QL STRIP.AUTO: NEGATIVE
PH UR STRIP: 5 [PH] (ref 5–8)
PROT UR STRIP-MCNC: NEGATIVE MG/DL
RBC #/AREA URNS HPF: ABNORMAL /HPF (ref 0–5)
SP GR UR REFRACTOMETRY: 1.03 (ref 1–1.03)
UROBILINOGEN UR QL STRIP.AUTO: 1 EU/DL (ref 0.2–1)
WBC URNS QL MICRO: ABNORMAL /HPF (ref 0–5)

## 2019-06-29 PROCEDURE — 74176 CT ABD & PELVIS W/O CONTRAST: CPT

## 2019-06-29 PROCEDURE — 74011636637 HC RX REV CODE- 636/637: Performed by: EMERGENCY MEDICINE

## 2019-06-29 PROCEDURE — 81001 URINALYSIS AUTO W/SCOPE: CPT

## 2019-06-29 PROCEDURE — 99284 EMERGENCY DEPT VISIT MOD MDM: CPT

## 2019-06-29 PROCEDURE — 74011000250 HC RX REV CODE- 250: Performed by: EMERGENCY MEDICINE

## 2019-06-29 PROCEDURE — 74011250637 HC RX REV CODE- 250/637: Performed by: EMERGENCY MEDICINE

## 2019-06-29 PROCEDURE — 81025 URINE PREGNANCY TEST: CPT

## 2019-06-29 PROCEDURE — 87086 URINE CULTURE/COLONY COUNT: CPT

## 2019-06-29 RX ORDER — LIDOCAINE 50 MG/G
PATCH TOPICAL
Qty: 15 EACH | Refills: 0 | Status: SHIPPED | OUTPATIENT
Start: 2019-06-29

## 2019-06-29 RX ORDER — CYCLOBENZAPRINE HCL 10 MG
10 TABLET ORAL
Qty: 15 TAB | Refills: 0 | Status: SHIPPED | OUTPATIENT
Start: 2019-06-29

## 2019-06-29 RX ORDER — PREDNISONE 20 MG/1
60 TABLET ORAL
Status: COMPLETED | OUTPATIENT
Start: 2019-06-29 | End: 2019-06-29

## 2019-06-29 RX ORDER — PREDNISONE 10 MG/1
TABLET ORAL
Qty: 1 PACKAGE | Refills: 0 | Status: SHIPPED | OUTPATIENT
Start: 2019-06-29

## 2019-06-29 RX ORDER — HYDROCODONE BITARTRATE AND ACETAMINOPHEN 5; 325 MG/1; MG/1
1 TABLET ORAL
Status: COMPLETED | OUTPATIENT
Start: 2019-06-29 | End: 2019-06-29

## 2019-06-29 RX ORDER — CYCLOBENZAPRINE HCL 10 MG
10 TABLET ORAL
Status: COMPLETED | OUTPATIENT
Start: 2019-06-29 | End: 2019-06-29

## 2019-06-29 RX ORDER — LIDOCAINE 4 G/100G
1 PATCH TOPICAL
Status: DISCONTINUED | OUTPATIENT
Start: 2019-06-29 | End: 2019-06-29 | Stop reason: HOSPADM

## 2019-06-29 RX ADMIN — HYDROCODONE BITARTRATE AND ACETAMINOPHEN 1 TABLET: 5; 325 TABLET ORAL at 08:21

## 2019-06-29 RX ADMIN — CYCLOBENZAPRINE HYDROCHLORIDE 10 MG: 10 TABLET, FILM COATED ORAL at 08:21

## 2019-06-29 RX ADMIN — PREDNISONE 60 MG: 20 TABLET ORAL at 08:21

## 2019-06-29 NOTE — LETTER
[facility] [place] 
[facility] 
[address] [id number] [phone] Work/School Note Date: 6/29/2019 To Whom It May concern: Leana Cloud was seen and treated today in the emergency room by the following provider(s): 
Attending Provider: Danny Proctor MD.   
 
Leana Cloud may return to work on 6/30/2019. Sincerely, Christa Rodgers

## 2019-06-29 NOTE — DISCHARGE INSTRUCTIONS
Patient Education        Back Pain: Care Instructions  Your Care Instructions    Back pain has many possible causes. It is often related to problems with muscles and ligaments of the back. It may also be related to problems with the nerves, discs, or bones of the back. Moving, lifting, standing, sitting, or sleeping in an awkward way can strain the back. Sometimes you don't notice the injury until later. Arthritis is another common cause of back pain. Although it may hurt a lot, back pain usually improves on its own within several weeks. Most people recover in 12 weeks or less. Using good home treatment and being careful not to stress your back can help you feel better sooner. Follow-up care is a key part of your treatment and safety. Be sure to make and go to all appointments, and call your doctor if you are having problems. It's also a good idea to know your test results and keep a list of the medicines you take. How can you care for yourself at home? · Sit or lie in positions that are most comfortable and reduce your pain. Try one of these positions when you lie down:  ? Lie on your back with your knees bent and supported by large pillows. ? Lie on the floor with your legs on the seat of a sofa or chair. ? Lie on your side with your knees and hips bent and a pillow between your legs. ? Lie on your stomach if it does not make pain worse. · Do not sit up in bed, and avoid soft couches and twisted positions. Bed rest can help relieve pain at first, but it delays healing. Avoid bed rest after the first day of back pain. · Change positions every 30 minutes. If you must sit for long periods of time, take breaks from sitting. Get up and walk around, or lie in a comfortable position. · Try using a heating pad on a low or medium setting for 15 to 20 minutes every 2 or 3 hours. Try a warm shower in place of one session with the heating pad. · You can also try an ice pack for 10 to 15 minutes every 2 to 3 hours. Put a thin cloth between the ice pack and your skin. · Take pain medicines exactly as directed. ? If the doctor gave you a prescription medicine for pain, take it as prescribed. ? If you are not taking a prescription pain medicine, ask your doctor if you can take an over-the-counter medicine. · Take short walks several times a day. You can start with 5 to 10 minutes, 3 or 4 times a day, and work up to longer walks. Walk on level surfaces and avoid hills and stairs until your back is better. · Return to work and other activities as soon as you can. Continued rest without activity is usually not good for your back. · To prevent future back pain, do exercises to stretch and strengthen your back and stomach. Learn how to use good posture, safe lifting techniques, and proper body mechanics. When should you call for help? Call your doctor now or seek immediate medical care if:    · You have new or worsening numbness in your legs.     · You have new or worsening weakness in your legs. (This could make it hard to stand up.)     · You lose control of your bladder or bowels.    Watch closely for changes in your health, and be sure to contact your doctor if:    · You have a fever, lose weight, or don't feel well.     · You do not get better as expected. Where can you learn more? Go to http://isabel-sofiya.info/. Enter G323 in the search box to learn more about \"Back Pain: Care Instructions. \"  Current as of: September 20, 2018  Content Version: 11.9  © 5703-5034 [employer]. Care instructions adapted under license by [employer] (which disclaims liability or warranty for this information). If you have questions about a medical condition or this instruction, always ask your healthcare professional. [place] [address] any warranty or liability for your use of this information.          Patient Education        Low Back Pain: Exercises  Your Care Instructions  Here are some examples of typical rehabilitation exercises for your condition. Start each exercise slowly. Ease off the exercise if you start to have pain. Your doctor or physical therapist will tell you when you can start these exercises and which ones will work best for you. How to do the exercises  Press-up    1. Lie on your stomach, supporting your body with your forearms. 2. Press your elbows down into the floor to raise your upper back. As you do this, relax your stomach muscles and allow your back to arch without using your back muscles. As your press up, do not let your hips or pelvis come off the floor. 3. Hold for 15 to 30 seconds, then relax. 4. Repeat 2 to 4 times. Alternate arm and leg (bird dog) exercise    1. Start on the floor, on your hands and knees. 2. Tighten your belly muscles. 3. Raise one leg off the floor, and hold it straight out behind you. Be careful not to let your hip drop down, because that will twist your trunk. 4. Hold for about 6 seconds, then lower your leg and switch to the other leg. 5. Repeat 8 to 12 times on each leg. 6. Over time, work up to holding for 10 to 30 seconds each time. 7. If you feel stable and secure with your leg raised, try raising the opposite arm straight out in front of you at the same time. Knee-to-chest exercise    1. Lie on your back with your knees bent and your feet flat on the floor. 2. Bring one knee to your chest, keeping the other foot flat on the floor (or keeping the other leg straight, whichever feels better on your lower back). 3. Keep your lower back pressed to the floor. Hold for at least 15 to 30 seconds. 4. Relax, and lower the knee to the starting position. 5. Repeat with the other leg. Repeat 2 to 4 times with each leg. 6. To get more stretch, put your other leg flat on the floor while pulling your knee to your chest.    Curl-ups    1.  Lie on the floor on your back with your knees bent at a 90-degree angle. Your feet should be flat on the floor, about 12 inches from your buttocks. 2. Cross your arms over your chest. If this bothers your neck, try putting your hands behind your neck (not your head), with your elbows spread apart. 3. Slowly tighten your belly muscles and raise your shoulder blades off the floor. 4. Keep your head in line with your body, and do not press your chin to your chest.  5. Hold this position for 1 or 2 seconds, then slowly lower yourself back down to the floor. 6. Repeat 8 to 12 times. Pelvic tilt exercise    1. Lie on your back with your knees bent. 2. \"Brace\" your stomach. This means to tighten your muscles by pulling in and imagining your belly button moving toward your spine. You should feel like your back is pressing to the floor and your hips and pelvis are rocking back. 3. Hold for about 6 seconds while you breathe smoothly. 4. Repeat 8 to 12 times. Heel dig bridging    1. Lie on your back with both knees bent and your ankles bent so that only your heels are digging into the floor. Your knees should be bent about 90 degrees. 2. Then push your heels into the floor, squeeze your buttocks, and lift your hips off the floor until your shoulders, hips, and knees are all in a straight line. 3. Hold for about 6 seconds as you continue to breathe normally, and then slowly lower your hips back down to the floor and rest for up to 10 seconds. 4. Do 8 to 12 repetitions. Hamstring stretch in doorway    1. Lie on your back in a doorway, with one leg through the open door. 2. Slide your leg up the wall to straighten your knee. You should feel a gentle stretch down the back of your leg. 3. Hold the stretch for at least 15 to 30 seconds. Do not arch your back, point your toes, or bend either knee. Keep one heel touching the floor and the other heel touching the wall. 4. Repeat with your other leg. 5. Do 2 to 4 times for each leg. Hip flexor stretch    1.  Kneel on the floor with one knee bent and one leg behind you. Place your forward knee over your foot. Keep your other knee touching the floor. 2. Slowly push your hips forward until you feel a stretch in the upper thigh of your rear leg. 3. Hold the stretch for at least 15 to 30 seconds. Repeat with your other leg. 4. Do 2 to 4 times on each side. Wall sit    1. Stand with your back 10 to 12 inches away from a wall. 2. Lean into the wall until your back is flat against it. 3. Slowly slide down until your knees are slightly bent, pressing your lower back into the wall. 4. Hold for about 6 seconds, then slide back up the wall. 5. Repeat 8 to 12 times. Follow-up care is a key part of your treatment and safety. Be sure to make and go to all appointments, and call your doctor if you are having problems. It's also a good idea to know your test results and keep a list of the medicines you take. Where can you learn more? Go to http://isabel-sofiya.info/. Enter V892 in the search box to learn more about \"Low Back Pain: Exercises. \"  Current as of: September 20, 2018  Content Version: 11.9  © 6422-1775 [employer]. Care instructions adapted under license by [employer] (which disclaims liability or warranty for this information). If you have questions about a medical condition or this instruction, always ask your healthcare professional. [place] [address] any warranty or liability for your use of this information. Patient Education        Blood in the Urine: Care Instructions  Your Care Instructions    Blood in the urine, or hematuria, may make the urine look red, brown, or pink. There may be blood every time you urinate or just from time to time. You cannot always see blood in the urine, but it will show up in a urine test.  Blood in the urine may be serious. It should always be checked by a doctor.  Your doctor may recommend more tests, including an X-ray, a CT scan, or a cystoscopy (which lets a doctor look inside the urethra and bladder). Blood in the urine can be a sign of another problem. Common causes are bladder infections and kidney stones. An injury to your groin or your genital area can also cause bleeding in the urinary tract. Very hard exercise--such as running a marathon--can cause blood in the urine. Blood in the urine can also be a sign of kidney disease or cancer in the bladder or kidney. Many cases of blood in the urine are caused by a harmless condition that runs in families. This is called benign familial hematuria. It does not need any treatment. Sometimes your urine may look red or brown even though it does not contain blood. For example, not getting enough fluids (dehydration), taking certain medicines, or having a liver problem can change the color of your urine. Eating foods such as beets, rhubarb, or blackberries or foods with red food coloring can make your urine look red or pink. Follow-up care is a key part of your treatment and safety. Be sure to make and go to all appointments, and call your doctor if you are having problems. It's also a good idea to know your test results and keep a list of the medicines you take. When should you call for help? Call your doctor now or seek immediate medical care if:    · You have symptoms of a urinary infection. For example:  ? You have pus in your urine. ? You have pain in your back just below your rib cage. This is called flank pain. ? You have a fever, chills, or body aches. ? It hurts to urinate. ? You have groin or belly pain.     · You have more blood in your urine.    Watch closely for changes in your health, and be sure to contact your doctor if:    · You have new urination problems.     · You do not get better as expected. Where can you learn more? Go to http://isabel-sofiya.info/.   Enter P724 in the search box to learn more about \"Blood in the Urine: Care Instructions. \"  Current as of: March 20, 2018  Content Version: 11.9  © 4939-0123 [employer], [facility]. Care instructions adapted under license by [employer] (which disclaims liability or warranty for this information). If you have questions about a medical condition or this instruction, always ask your healthcare professional. [place] [address] any warranty or liability for your use of this information.

## 2019-06-29 NOTE — ED PROVIDER NOTES
EMERGENCY DEPARTMENT HISTORY AND PHYSICAL EXAM    Date: 6/29/2019  Patient Name: Romelia Chaudhry    History of Presenting Illness     Chief Complaint   Patient presents with    Back Pain         History Provided By: Patient and Patient's     7:50 AM  Romelia Chaudhry is a 46 y.o. female with PMHX of arthritis who presents to the emergency department C/O back pain that started yesterday. Patient states she does a lot of heavy lifting of boxes for her job and yesterday at work started having pain in her low back. She says the pain got worse throughout the night and goes across her entire back and she can get comfortable when she was trying to sleep. She denies any numbness or weakness, bowel or urinary complaints, fever, IV drug use, falls, or other complaints. She is currently in physical therapy for hip arthritis. She did not take any medications for this pain. No other complaints. PCP: Licah Cabezas MD    Current Facility-Administered Medications   Medication Dose Route Frequency Provider Last Rate Last Dose    lidocaine 4 % patch 1 Patch  1 Patch TransDERmal NOW Kimberly Shukla MD   1 Patch at 06/29/19 0821     Current Outpatient Medications   Medication Sig Dispense Refill    predniSONE (STERAPRED DS) 10 mg dose pack 6 day dose pack per package instructions 1 Package 0    cyclobenzaprine (FLEXERIL) 10 mg tablet Take 1 Tab by mouth three (3) times daily as needed for Muscle Spasm(s). 15 Tab 0    lidocaine (LIDODERM) 5 % Apply patch to the affected area for 12 hours a day and remove for 12 hours a day. 15 Each 0    EPINEPHrine (EPIPEN) 0.3 mg/0.3 mL (1:1,000) injection 0.3 mg by IntraMUSCular route once as needed.          Past History     Past Medical History:  Past Medical History:   Diagnosis Date    IBS (irritable bowel syndrome)     Seizures (HCC)        Past Surgical History:  Past Surgical History:   Procedure Laterality Date    BREAST SURGERY PROCEDURE UNLISTED      lump removed    HX APPENDECTOMY      HX HYSTERECTOMY      HX TUBAL LIGATION         Family History:  History reviewed. No pertinent family history. Social History:  Social History     Tobacco Use    Smoking status: Former Smoker    Smokeless tobacco: Never Used   Substance Use Topics    Alcohol use: Yes     Comment: rare    Drug use: No       Allergies: Allergies   Allergen Reactions    Purdy Rash         Review of Systems   Review of Systems   Constitutional: Negative for fever. Respiratory: Negative for shortness of breath. Cardiovascular: Negative for chest pain. Musculoskeletal: Positive for back pain. All other systems reviewed and are negative.         Physical Exam     Vitals:    06/29/19 0741   BP: 127/62   Pulse: 70   Resp: 18   Temp: 97.9 °F (36.6 °C)   SpO2: 100%   Weight: 80.7 kg (178 lb)   Height: 5' 8\" (1.727 m)     Physical Exam    Nursing notes and vital signs reviewed    Constitutional: Non toxic appearing, no acute distress  Head: Normocephalic, Atraumatic  Eyes: EOMI  Neck: Supple  Cardiovascular: Regular rate and rhythm, no murmurs, rubs, or gallops  Chest: Normal work of breathing and chest excursion bilaterally  Lungs: Clear to ausculation bilaterally  Abdomen: Soft, non tender, non distended, normoactive bowel sounds  Back: No evidence of trauma or deformity, tenderness across lower back without bony point tenderness to palpation, no overlying skin changes  Extremities: No evidence of trauma or deformity, no LE edema, distal neurovascular intact  Skin: Warm and dry, normal cap refill  Neuro: Alert and appropriate, CN intact, normal speech, strength and sensation full and symmetric bilaterally, normal gait, normal coordination  Psychiatric: Normal mood and affect      Diagnostic Study Results     Labs -     Recent Results (from the past 12 hour(s))   URINALYSIS W/ RFLX MICROSCOPIC    Collection Time: 06/29/19  8:00 AM   Result Value Ref Range    Color YELLOW      Appearance CLEAR      Specific gravity 1.026 1.005 - 1.030      pH (UA) 5.0 5.0 - 8.0      Protein NEGATIVE  NEG mg/dL    Glucose NEGATIVE  NEG mg/dL    Ketone NEGATIVE  NEG mg/dL    Bilirubin NEGATIVE  NEG      Blood MODERATE (A) NEG      Urobilinogen 1.0 0.2 - 1.0 EU/dL    Nitrites NEGATIVE  NEG      Leukocyte Esterase NEGATIVE  NEG     URINE MICROSCOPIC ONLY    Collection Time: 06/29/19  8:00 AM   Result Value Ref Range    WBC 0 to 3 0 - 5 /hpf    RBC 4 to 6 0 - 5 /hpf    Epithelial cells FEW 0 - 5 /lpf    Bacteria FEW (A) NEG /hpf   HCG URINE, QL    Collection Time: 06/29/19  8:00 AM   Result Value Ref Range    HCG urine, QL NEGATIVE  NEG         Radiologic Studies -   CT ABD PELV WO CONT   Final Result   IMPRESSION:      1. No CT findings of an acute intra-abdominal or intrapelvic obstructive or   inflammatory process. No CT evidence to explain the patient's presenting   symptoms. CT Results  (Last 48 hours)               06/29/19 1034  CT ABD PELV WO CONT Final result    Impression:  IMPRESSION:       1. No CT findings of an acute intra-abdominal or intrapelvic obstructive or   inflammatory process. No CT evidence to explain the patient's presenting   symptoms. Narrative:  EXAM: CT of the abdomen and pelvis       INDICATION: Kidney stone, Left-sided pain radiating to lower back and upper   back, unable to sleep secondary to pain       COMPARISON: 11/4/2016       TECHNIQUE: Axial CT imaging of the abdomen and pelvis was performed without   intravenous contrast. Multiplanar reformats were generated. One or more dose   reduction techniques were used on this CT: automated exposure control,   adjustment of the mAs and/or kVp according to patient size, and iterative   reconstruction techniques. The specific techniques used on this CT exam have   been documented in the patient's electronic medical record.  Digital Imaging and   Communications in Medicine (DICOM) format image data are available to   nonaffiliated external healthcare facilities or entities on a secure, media   free, reciprocally searchable basis with patient authorization for at least a   12-month period after this study. _______________       FINDINGS:       LOWER CHEST: Left greater the right linear to bandlike atelectatic scarring with   no acute abnormality. No pericardial or pleural effusion. LIVER, BILIARY:      > Liver: No acute suspicious abnormality.      > Biliary: No biliary dilation. Gallbladder is unremarkable. PANCREAS: Unremarkable noncontrast imaging       SPLEEN: Normal.       ADRENALS: Stable benign left adrenal adenoma, which is not require imaging   follow-up. Normal right adrenal gland. KIDNEYS, URETERS, BLADDER: The kidneys are iso-attenuating without evidence of   hydronephrosis, nephrolithiasis or masses. No perinephric fluid collections. No   hydroureter or ureterolithiasis. Unremarkable bladder. GASTROINTESTINAL TRACT: No bowel dilation or wall thickening. LYMPH NODES: No enlarged lymph nodes. PELVIC ORGANS: Prior hysterectomy. Otherwise, unremarkable       VASCULATURE: Tibial atherosclerotic changes of the normal caliber aorta       OSSEOUS: No acute or aggressive osseous abnormalities identified. Minimal   degenerative anterior endplate spondylosis, unchanged to prior exam. Limbus   vertebra at the L4 vertebral body which is not require imaging follow-up. .       OTHER: None.       _______________               CXR Results  (Last 48 hours)    None          Medications given in the ED-  Medications   lidocaine 4 % patch 1 Patch (1 Patch TransDERmal Apply Patch 6/29/19 0821)   predniSONE (DELTASONE) tablet 60 mg (60 mg Oral Given 6/29/19 0821)   cyclobenzaprine (FLEXERIL) tablet 10 mg (10 mg Oral Given 6/29/19 0821)   HYDROcodone-acetaminophen (NORCO) 5-325 mg per tablet 1 Tab (1 Tab Oral Given 6/29/19 6663)         Medical Decision Making   I am the first provider for this patient.     I reviewed the vital signs, available nursing notes, past medical history, past surgical history, family history and social history. Vital Signs-Reviewed the patient's vital signs. Records Reviewed: Nursing Notes    Provider Notes (Medical Decision Making): Osvaldo Doshi is a 46 y.o. female presenting for low back pain. No alarm signs or symptoms. Neurovascularly intact. Hematuria noted on UA and culture sent and CT obtained without abnormality. Pain better controlled after oral medications. Plan for discharge with symptom management, early PCP follow-up, and return precautions. Patient understands and agrees with this plan. Procedures:  Procedures    ED Course:   11:33 AM  Updated on all results and all questions answered      Diagnosis and Disposition       DISCHARGE NOTE:    Corie Camejo  results have been reviewed with her. She has been counseled regarding her diagnosis, treatment, and plan. She verbally conveys understanding and agreement of the signs, symptoms, diagnosis, treatment and prognosis and additionally agrees to follow up as discussed. She also agrees with the care-plan and conveys that all of her questions have been answered. I have also provided discharge instructions for her that include: educational information regarding their diagnosis and treatment, and list of reasons why they would want to return to the ED prior to their follow-up appointment, should her condition change. She has been provided with education for proper emergency department utilization. CLINICAL IMPRESSION:    1. Acute midline low back pain without sciatica    2. Hematuria, unspecified type        PLAN:  1. D/C Home  2.    Current Discharge Medication List      START taking these medications    Details   predniSONE (STERAPRED DS) 10 mg dose pack 6 day dose pack per package instructions  Qty: 1 Package, Refills: 0      cyclobenzaprine (FLEXERIL) 10 mg tablet Take 1 Tab by mouth three (3) times daily as needed for Muscle Spasm(s). Qty: 15 Tab, Refills: 0      lidocaine (LIDODERM) 5 % Apply patch to the affected area for 12 hours a day and remove for 12 hours a day. Qty: 15 Each, Refills: 0         STOP taking these medications       naproxen (NAPROSYN) 500 mg tablet Comments:   Reason for Stopping:             3.   Follow-up Information     Follow up With Specialties Details Why Contact Info    Ryan Mireles MD [facility] Schedule an appointment as soon as possible for a visit  [address] [place] [address] EMERGENCY DEPT Emergency Medicine  If symptoms worsen [address] [place] [address]  [phone]        _______________________________      Please note that this dictation was completed with W[employer]gle, the computer voice recognition software. Quite often unanticipated grammatical, syntax, homophones, and other interpretive errors are inadvertently transcribed by the computer software. Please disregard these errors. Please excuse any errors that have escaped final proofreading.

## 2019-06-29 NOTE — ED TRIAGE NOTES
Pt states yest unloading boxes, pt states took 2 boxes off and began feeling lt hip pain, pt states pain radiated into lt lower back and radiates up back, pt unable to sleep due to pain, increase pain with getting up from sitting position

## 2019-07-01 ENCOUNTER — APPOINTMENT (OUTPATIENT)
Dept: PHYSICAL THERAPY | Age: 49
End: 2019-07-01
Payer: OTHER GOVERNMENT

## 2019-07-01 LAB
BACTERIA SPEC CULT: NORMAL
SERVICE CMNT-IMP: NORMAL

## 2019-07-02 ENCOUNTER — APPOINTMENT (OUTPATIENT)
Dept: PHYSICAL THERAPY | Age: 49
End: 2019-07-02
Payer: OTHER GOVERNMENT

## 2019-07-03 ENCOUNTER — HOSPITAL ENCOUNTER (OUTPATIENT)
Dept: PHYSICAL THERAPY | Age: 49
Discharge: HOME | End: 2019-07-03
Payer: OTHER GOVERNMENT

## 2019-07-03 PROCEDURE — 97110 THERAPEUTIC EXERCISES: CPT

## 2019-07-03 NOTE — PROGRESS NOTES
PT DAILY TREATMENT NOTE    Patient Name: Rico Grant  Date:7/3/2019  : 1970  [x]  Patient  Verified  Payor: HAKAN / Plan: Anthony Rahman 74 / Product Type: Toni Shinera /    In time:  Out time:  Total Treatment Time (min): 51  Total Timed Codes (min): 41  1:1 Treatment Time ( [address] only): 41   Visit #: 11 of 12    Treatment Area: Right hip pain [M25.551]    SUBJECTIVE  Pain Level (0-10 scale): 3/10  Any medication changes, allergies to medications, adverse drug reactions, diagnosis change, or new procedure performed?: [x] No    [] Yes (see summary sheet for update)  Subjective functional status/changes:   [] No changes reported  Patient pleasant, reports she had to go to ED last Friday due to back pain. She reports she will be getting new Rx for LBP.     OBJECTIVE    Modalities Rationale:     decrease pain and increase tissue extensibility to improve patient's ability to restore PLOF   min [] Estim, type/location:                                      []  att     []  unatt     []  w/US     []  w/ice    []  w/heat    min []  Mechanical Traction: type/lbs                   []  pro   []  sup   []  int   []  cont    []  before manual    []  after manual    min []  Ultrasound, settings/location:      min []  Iontophoresis w/ dexamethasone, location:                                               []  take home patch       []  in clinic   10 min []  Ice     [x]  Heat    location/position: Low back    min []  Vasopneumatic Device, press/temp:     min []  Other:    [x] Skin assessment post-treatment (if applicable):    [x]  intact    [x]  redness- no adverse reaction     []redness - adverse reaction:          41 min Therapeutic Exercise:  [x] See flow sheet :   Rationale: increase ROM and increase strength to improve the patients ability to restore PLOF            With   [x] TE   [] TA   [] neuro   [] other: Patient Education: [x] Review HEP    [] Progressed/Changed HEP based on:   [] positioning   [] body mechanics   [] transfers   [] heat/ice application    [] other:        Pain Level (0-10 scale) post treatment: 1/10    ASSESSMENT/Changes in Function: Patient tolerated treatment session well today. Pt had no complaints with stretching today. Did not do any hip strengthening due to back pain. Patient will continue to benefit from skilled PT services to modify and progress therapeutic interventions, address functional mobility deficits, address ROM deficits, address strength deficits, analyze and address soft tissue restrictions, analyze and cue movement patterns, analyze and modify body mechanics/ergonomics and assess and modify postural abnormalities to attain remaining goals. [x]  See Plan of Care  []  See progress note/recertification  []  See Discharge Summary         Progress towards goals / Updated goals:  Short Term Goals: To be accomplished in 3 weeks:  1. Patient will be compliant with HEP to progress toward goals and restore functional mobility. Eval Status: Issued at [address] daily 6/27/19     2. Patient will improve FOTO score by 8 points to improve functional tolerance for exercise. Eval Status: FOTO 42   Current: FOTO 45-progressing 6/14/19- will report at next visit     3. Patient will improve pain in right hip to 3/10 at worst to improve ambulation tolerance and restore prior level of function. Eval Status: 6/10 at worst  Current: 2/10 at worst, 6/27/19      Long Term Goals: To be accomplished in 6 weeks:  1. 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 Eval Status: issued at Eval  Current: reports compliance with new exercises added to HEP at last visit 6/27/19     2. Patient will improve FOTO score by 15 points to improve functional tolerance for return to previous level of exercise. Eval Status: FOTO 42    Current: FOTO 45-progressing 6/14/19 will reports at next visit     3.  Patient will have 5/5 bilateral LE strength to return to goals of return to previous level of exercise.   Eval Status:   Hip L (1-5) R (1-5)   Hip Flexion 4+/5 4-/5   Hip Ext 4/5 4-/5*   Hip ABD 4/5 4-/5*   Hip ADD 4/5 4-/5   Hip ER       Hip IR          Knee L (1-5) R (1-5)   Knee Flexion 4+/5 4/5   Knee Extension 4+/5 4/5   Ankle PF       Ankle DF       Other          Current:  Hip L (1-5) R (1-5)   Hip Flexion 5/5 5/5   Hip Ext 5/5 5/5   Hip ABD 4+/5 4+/5   Hip ADD 4+/5 4+/5   Hip ER       Hip IR          Knee L (1-5) R (1-5)   Knee Flexion 5/5 5/5   Knee Extension 5/5 5/5   Ankle PF       Ankle DF       Other       Progressing 6/27/19                PLAN  [x]  Upgrade activities as tolerated     [x]  Continue plan of care  []  Update interventions per flow sheet       []  Discharge due to:_  []  Other:_      [facility] 7/3/2019  5:39 PM    Future Appointments   Date Time Provider Chris Liz   7/8/2019  4:30 PM Liz [facility]   7/11/2019  8:30 AM Marivel Lowry [facility]   7/16/2019 10:00 AM Marivel Lowry [facility]   7/19/2019  1:30 PM Marivel Lowry [facility]

## 2019-07-08 ENCOUNTER — HOSPITAL ENCOUNTER (OUTPATIENT)
Dept: PHYSICAL THERAPY | Age: 49
Discharge: HOME | End: 2019-07-08
Payer: OTHER GOVERNMENT

## 2019-07-08 PROCEDURE — 97110 THERAPEUTIC EXERCISES: CPT

## 2019-07-08 NOTE — PROGRESS NOTES
PT DAILY TREATMENT NOTE    Patient Name: Ingrid Childs  Date:2019  : 1970  [x]  Patient  Verified  Payor: HAKAN / Plan: Anthony Rahman 74 / Product Type: Vivi Rubin /    In time:0  Out time:1708  Total Treatment Time (min): 38  Total Timed Codes (min): 38  1:1 Treatment Time ( [address] only): 45   Visit #: 12 of 14    Treatment Area: Right hip pain [M25.551]    SUBJECTIVE  Pain Level (0-10 scale): 2/10  Any medication changes, allergies to medications, adverse drug reactions, diagnosis change, or new procedure performed?: [x] No    [] Yes (see summary sheet for update)  Subjective functional status/changes:   [] No changes reported  Patient pleasant, reports her back is feeling better since last appointment. OBJECTIVE      38 min Therapeutic Exercise:  [x] See flow sheet :   Rationale: increase ROM and increase strength to improve the patients ability to restore PLOF      With   [x] TE   [] TA   [] neuro   [] other: Patient Education: [x] Review HEP    [] Progressed/Changed HEP based on:   [] positioning   [] body mechanics   [] transfers   [] heat/ice application    [] other:        Pain Level (0-10 scale) post treatment: 1/10    ASSESSMENT/Changes in Function: Patient tolerated treatment session well today. No complaints with added hip strengthening exercises today. Patient will continue to benefit from skilled PT services to modify and progress therapeutic interventions, address functional mobility deficits, address ROM deficits, address strength deficits, analyze and address soft tissue restrictions, analyze and cue movement patterns, analyze and modify body mechanics/ergonomics and assess and modify postural abnormalities to attain remaining goals. [x]  See Plan of Care  []  See progress note/recertification  []  See Discharge Summary         Progress towards goals / Updated goals:  Short Term Goals: To be accomplished in 3 weeks:  1.  Patient will be compliant with HEP to progress toward goals and restore functional mobility. Eval Status: Issued at [address] daily 6/27/19     2. Patient will improve FOTO score by 8 points to improve functional tolerance for exercise. Eval Status: FOTO 42   Current: FOTO 45-progressing 6/14/19- will report at next visit     3. Patient will improve pain in right hip to 3/10 at worst to improve ambulation tolerance and restore prior level of function. Eval Status: 6/10 at worst  Current: 2/10 at worst, 6/27/19      Long Term Goals: To be accomplished in 6 weeks:  1. 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 Eval Status: issued at Eval  Current: reports compliance with new exercises added to HEP at last visit 6/27/19     2. Patient will improve FOTO score by 15 points to improve functional tolerance for return to previous level of exercise. Eval Status: FOTO 42    Current: FOTO 45-progressing 6/14/19 will reports at next visit     3. Patient will have 5/5 bilateral LE strength to return to goals of return to previous level of exercise.   Eval Status:   Hip L (1-5) R (1-5)   Hip Flexion 4+/5 4-/5   Hip Ext 4/5 4-/5*   Hip ABD 4/5 4-/5*   Hip ADD 4/5 4-/5   Hip ER       Hip IR          Knee L (1-5) R (1-5)   Knee Flexion 4+/5 4/5   Knee Extension 4+/5 4/5   Ankle PF       Ankle DF       Other          Current:  Hip L (1-5) R (1-5)   Hip Flexion 5/5 5/5   Hip Ext 5/5 5/5   Hip ABD 4+/5 4+/5   Hip ADD 4+/5 4+/5   Hip ER       Hip IR          Knee L (1-5) R (1-5)   Knee Flexion 5/5 5/5   Knee Extension 5/5 5/5   Ankle PF       Ankle DF       Other       Progressing 6/27/19                PLAN  [x]  Upgrade activities as tolerated     [x]  Continue plan of care  []  Update interventions per flow sheet       []  Discharge due to:_  []  Other:_      [address] [facility],  7/8/2019  4:55 PM    Future Appointments   Date Time Provider Chris Liz   7/11/2019  8:30 AM Adsit, Shannan Mena, PT [facility]   7/16/2019 10:00 AM Farida Niño PT [facility]   7/19/2019  1:30 PM Blake Cuello, PT [facility]

## 2019-07-11 ENCOUNTER — HOSPITAL ENCOUNTER (OUTPATIENT)
Dept: PHYSICAL THERAPY | Age: 49
Discharge: HOME | End: 2019-07-11
Payer: OTHER GOVERNMENT

## 2019-07-11 PROCEDURE — 97140 MANUAL THERAPY 1/> REGIONS: CPT

## 2019-07-11 PROCEDURE — 97110 THERAPEUTIC EXERCISES: CPT

## 2019-07-11 NOTE — PROGRESS NOTES
PT DAILY TREATMENT NOTE    Patient Name: Lacey Liang  Date:2019  : 1970  [x]  Patient  Verified  Payor:  / Plan: Anthony Rahman 74 / Product Type:  /    In time:830  Out time:915  Total Treatment Time (min): 45  Total Timed Codes (min): 45  1:1 Treatment Time ( [address] only): n/a   Visit #: 13 of 14    Treatment Area: Right hip pain [M25.551]    SUBJECTIVE  Pain Level (0-10 scale): 3/10 right hip, 4-5 LB  Any medication changes, allergies to medications, adverse drug reactions, diagnosis change, or new procedure performed?: [x] No    [] Yes (see summary sheet for update)  Subjective functional status/changes:   [] No changes reported  Reports feeling better, able to walk with more ease, right hip pain unchanged  Less pain with long distance walking  LBP is improving    OBJECTIVE      25 min Therapeutic Exercise:  [x] See flow sheet :   Rationale: increase ROM and increase strength to improve the patients ability to restore PLOF    10 min Manual Therapy: functional massage right piriformis, STM with Hugo balls  Rationale: pain control and muscle relaxation    10 min MH to LB/buttock region in   Rationale: muscle relaxation    With   [x] TE   [] TA   [] neuro   [] other: Patient Education: [x] Review HEP    [] Progressed/Changed HEP based on:   [] positioning   [] body mechanics   [] transfers   [] heat/ice application    [] other:        Pain Level (0-10 scale) post treatment: 1/10    ASSESSMENT/Changes in Function: Good tolerance to activities     Patient will continue to benefit from skilled PT services to modify and progress therapeutic interventions, address functional mobility deficits, address ROM deficits, address strength deficits, analyze and address soft tissue restrictions, analyze and cue movement patterns, analyze and modify body mechanics/ergonomics and assess and modify postural abnormalities to attain remaining goals.      [x]  See Plan of Care  []  See progress note/recertification  []  See Discharge Summary         Progress towards goals / Updated goals:  Short Term Goals: To be accomplished in 3 weeks:  1. Patient will be compliant with HEP to progress toward goals and restore functional mobility. Eval Status: Issued at [address] daily 6/27/19     2. Patient will improve FOTO score by 8 points to improve functional tolerance for exercise. Eval Status: FOTO 42   Current: FOTO 45-progressing 6/14/19- will report at next visit     3. Patient will improve pain in right hip to 3/10 at worst to improve ambulation tolerance and restore prior level of function. Eval Status: 6/10 at worst  Current: 2/10 at worst, 6/27/19      Long Term Goals: To be accomplished in 6 weeks:  1. 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 Eval Status: issued at Eval  Current: reports compliance with new exercises added to HEP at last visit 6/27/19     2. Patient will improve FOTO score by 15 points to improve functional tolerance for return to previous level of exercise. Eval Status: FOTO 42    Current: FOTO 45-progressing 6/14/19 will reports at next visit     3. Patient will have 5/5 bilateral LE strength to return to goals of return to previous level of exercise.   Eval Status:   Hip L (1-5) R (1-5)   Hip Flexion 4+/5 4-/5   Hip Ext 4/5 4-/5*   Hip ABD 4/5 4-/5*   Hip ADD 4/5 4-/5   Hip ER       Hip IR          Knee L (1-5) R (1-5)   Knee Flexion 4+/5 4/5   Knee Extension 4+/5 4/5   Ankle PF       Ankle DF       Other          Current:  Hip L (1-5) R (1-5)   Hip Flexion 5/5 5/5   Hip Ext 5/5 5/5   Hip ABD 4+/5 4+/5   Hip ADD 4+/5 4+/5   Hip ER       Hip IR          Knee L (1-5) R (1-5)   Knee Flexion 5/5 5/5   Knee Extension 5/5 5/5   Ankle PF       Ankle DF       Other       Progressing 6/27/19                PLAN  [x]  Upgrade activities as tolerated     [x]  Continue plan of care  []  Update interventions per flow sheet       []  Discharge due to:_  []  Other:_      Jackson Rg, PT 7/11/2019  4:55 PM    Future Appointments   Date Time Provider Chris Liz   7/16/2019 10:00 AM Wilder Stephens, PT [facility]   7/19/2019  1:30 PM Radha Cline, PT [facility]

## 2019-07-16 ENCOUNTER — HOSPITAL ENCOUNTER (OUTPATIENT)
Dept: PHYSICAL THERAPY | Age: 49
Discharge: HOME | End: 2019-07-16
Payer: OTHER GOVERNMENT

## 2019-07-16 PROCEDURE — 97110 THERAPEUTIC EXERCISES: CPT

## 2019-07-16 NOTE — PROGRESS NOTES
PT DAILY TREATMENT NOTE    Patient Name: Romelia Chaudhry  Date:2019  : 1970  [x]  Patient  Verified  Payor:  / Plan: Anthony Rahman 74 / Product Type:  /    In time:1000  Out time:1048  Total Treatment Time (min): 48  Total Timed Codes (min): 38  1:1 Treatment Time ( only): NA   Visit #: 14 of 14    Treatment Area: Right hip pain [M25.551]    SUBJECTIVE  Pain Level (0-10 scale): 1-2/10 hip pain  Any medication changes, allergies to medications, adverse drug reactions, diagnosis change, or new procedure performed?: [x] No    [] Yes (see summary sheet for update)  Subjective functional status/changes:   [] No changes reported  Patient pleasant, reports she feels like she is improving    OBJECTIVE    Modalities Rationale:     decrease pain and increase tissue extensibility to improve patient's ability to restore PLOF   min [] Estim, type/location:                                      []  att     []  unatt     []  w/US     []  w/ice    []  w/heat    min []  Mechanical Traction: type/lbs                   []  pro   []  sup   []  int   []  cont    []  before manual    []  after manual    min []  Ultrasound, settings/location:      min []  Iontophoresis w/ dexamethasone, location:                                               []  take home patch       []  in clinic   10 min []  Ice     [x]  Heat    location/position: Prone, low back    min []  Vasopneumatic Device, press/temp:     min []  Other:    [x] Skin assessment post-treatment (if applicable):    [x]  intact    []  redness- no adverse reaction     []redness - adverse reaction:          38 min Therapeutic Exercise:  [] See flow sheet :   Rationale: increase ROM and increase strength to improve the patients ability to restore PLOF            With   [x] TE   [] TA   [] neuro   [] other: Patient Education: [x] Review HEP    [] Progressed/Changed HEP based on:   [] positioning   [] body mechanics   [] transfers   [] heat/ice application    [] other:      Pain Level (0-10 scale) post treatment: 0/10    ASSESSMENT/Changes in Function: Patient tolerated treatment session well today. No complaints with therex for hip pain. Patient is making good progress toward goals. Patient will continue to benefit from skilled PT services to modify and progress therapeutic interventions, address functional mobility deficits, address ROM deficits, address strength deficits, analyze and address soft tissue restrictions, analyze and cue movement patterns, analyze and modify body mechanics/ergonomics and assess and modify postural abnormalities to attain remaining goals. [x]  See Plan of Care  []  See progress note/recertification  []  See Discharge Summary         Progress towards goals / Updated goals:    Short Term Goals: To be accomplished in 3 weeks:  1. Patient will be compliant with HEP to progress toward goals and restore functional mobility. Eval Status: Issued at [address] [place] [address] daily 6/27/19     2. Patient will improve FOTO score by 8 points to improve functional tolerance for exercise. Eval Status: FOTO 42   Current: FOTO 45-progressing 6/14/19- will report at next visit     3. Patient will improve pain in right hip to 3/10 at worst to improve ambulation tolerance and restore prior level of function. Eval Status: 6/10 at worst  Current: 2/10 at worst, 6/27/19      Long Term Goals: To be accomplished in 6 weeks:  1. 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 Eval Status: issued at [facility]  Current: reports compliance with new exercises added to HEP at last visit 6/27/19     2. Patient will improve FOTO score by 15 points to improve functional tolerance for return to previous level of exercise. Eval Status: FOTO 42    Current: FOTO 45-progressing 6/14/19 will reports at next visit     3.  Patient will have 5/5 bilateral LE strength to return to goals of return to previous level of exercise.   Eval Status:   Hip L (1-5) R (1-5)   Hip Flexion 4+/5 4-/5   Hip Ext 4/5 4-/5*   Hip ABD 4/5 4-/5*   Hip ADD 4/5 4-/5   Hip ER       Hip IR          Knee L (1-5) R (1-5)   Knee Flexion 4+/5 4/5   Knee Extension 4+/5 4/5   Ankle PF       Ankle DF       Other          Current:  Hip L (1-5) R (1-5)   Hip Flexion 5/5 5/5   Hip Ext 5/5 5/5   Hip ABD 4+/5 4+/5   Hip ADD 4+/5 4+/5   Hip ER       Hip IR          Knee L (1-5) R (1-5)   Knee Flexion 5/5 5/5   Knee Extension 5/5 5/5   Ankle PF       Ankle DF       Other       Progressing 6/27/19             PLAN  [x]  Upgrade activities as tolerated     [x]  Continue plan of care  []  Update interventions per flow sheet       []  Discharge due to:_  []  Other:_      Quyen Lowry PT 7/16/2019  10:19 AM    Future Appointments   Date Time Provider Chris Liz   7/19/2019  1:30 PM Jordan Teixeira PT [facility]   7/22/2019 10:00 AM Quyen Lowry PT [facility]

## 2019-07-19 ENCOUNTER — APPOINTMENT (OUTPATIENT)
Dept: PHYSICAL THERAPY | Age: 49
End: 2019-07-19
Payer: OTHER GOVERNMENT

## 2019-07-22 ENCOUNTER — HOSPITAL ENCOUNTER (OUTPATIENT)
Dept: PHYSICAL THERAPY | Age: 49
Discharge: HOME | End: 2019-07-22
Payer: OTHER GOVERNMENT

## 2019-07-22 PROCEDURE — 97110 THERAPEUTIC EXERCISES: CPT

## 2019-07-22 PROCEDURE — 97162 PT EVAL MOD COMPLEX 30 MIN: CPT

## 2019-07-22 NOTE — PROGRESS NOTES
PT DAILY TREATMENT NOTE/ LUMBAR EVAL 10-18    Patient Name: Anju Avalos  Date:2019  : 1970  [x]  Patient  Verified  Payor:  / Plan: Lyndal Leap / Product Type:  /    In time:1000  Out time:1100  Total Treatment Time (min): 60  Visit #: 1 of 12    Medicare/[facility] Only   Total Timed Codes (min):  40 1:1 Treatment Time:  60       Treatment Area: Low back pain [M54.5]    SUBJECTIVE  Pain Level (0-10 scale): 2/10  [x]constant []intermittent []improving []worsening []no change since onset    Any medication changes, allergies to medications, adverse drug reactions, diagnosis change, or new procedure performed?: [x] No    [] Yes (see summary sheet for update)  Subjective functional status/changes:     PLOF: functionally independent, no AD  Limitations to PLOF: pain, stiffness, no radicular symptoms  Mechanism of Injury: , pt reports she had worked the day that it happened but she doesn't think that her work duties caused it, she had significant pain that evening into the next day to the point where she had trouble walking  Current symptoms/Complaints: pain, stiffness, spasms, 2/10 \"soreness\" at best when she first wakes up in the morning, 8/10 at worst when injury first happened, has been 4-5/10 after being on her feet for too long and doing too much  Previous Treatment/Compliance: prior PT for right hip pain, hot pack  PMHx/Surgical Hx: cervical cancer, arthritis, hysterectomy, appendectomy, lumpectomy right breast  Work Hx: works full time,  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, [employer]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  Pt Goals: \"to be able to bend down and not have it hurt so bad\"  Barriers: [x]pain []financial []time []transportation []other  Motivation: good  Substance use: []Alcohol []Tobacco []other:   Cognition: A & O x 3    OBJECTIVE     Modalities Rationale:     decrease pain and increase tissue extensibility to improve patient's ability to restore PLOF   min [] Estim, type/location:                                      []  att     []  unatt     []  w/US     []  w/ice    []  w/heat    min []  Mechanical Traction: type/lbs                   []  pro   []  sup   []  int   []  cont    []  before manual    []  after manual    min []  Ultrasound, settings/location:      min []  Iontophoresis w/ dexamethasone, location:                                               []  take home patch       []  in clinic   10 min []  Ice     [x]  Heat    location/position: Low back    min []  Vasopneumatic Device, press/temp:     min []  Other:    [x] Skin assessment post-treatment (if applicable):    [x]  intact    []  redness- no adverse reaction     []redness  adverse reaction:        10 min [x]Eval                  []Re-Eval       40 min Therapeutic Exercise:  [x] See flow sheet :   Rationale: increase ROM to improve the patients ability to restore PLOF            With   [x] TE   [] TA   [] neuro   [] other: Patient Education: [x] Review HEP    [] Progressed/Changed HEP based on:   [] positioning   [] body mechanics   [] transfers   [] heat/ice application    [] other:        General Evaluation    Posture: rounded shoulder, forward head  Palpation/Sensation: tender over bilateral piriformis and lower lumbar paraspinals    ROM:                               Trunk Left Right   Flex: 75%*     Ext: 50%     Side bend 50% 50%   Rotation 75% 75%               Strength (MMT):                                            Hip L (1-5) R (1-5)   Hip Flexion 4/5 4/5   Hip Ext 4-/5 4-/5   Hip ABD 4-/5 4-/5   Hip ADD     Hip ER 4-/5 4-/5   Hip IR 4-/5 4-/5     Knee L (1-5) R (1-5)   Knee Flexion 4/5 4/5   Knee Extension 4/5 4/5   Ankle PF 5/5 5/5   Ankle DF 5/5 5/5   Other         Landmarks:      Iliac Crest  level   PSIS Level, tender     Special Tests:         Right  Left  Slump Test - -   SLR - -   Dhruv Test + +     Other Tests / Comments: decreased HS flexibility, decreased quad flexibility, positive Gale's test       Pain Level (0-10 scale) post treatment: 2/10    ASSESSMENT/Changes in Function: 47 yo female who presents to [facility] with c/o low back pain. Pt reports on June 28th she had significant pain that evening into the next day to the point where she had trouble walking. She reports she had worked the day but she doesn't think that her work duties caused it the pain. Pt reports she has 2/10 pain at best and 8/10 pain at worst. Patient presents with decreased flexibility, decreased strength, decreased lumbar ROM, impaired functional mobility tolerance and pain. Patient has no radicular symptoms but localizes pain to lower lumbar paraspinals and bilateral PSIS. Patient would benefit from skilled PT intervention to address theses deficits. Patient will continue to benefit from skilled PT services to modify and progress therapeutic interventions, address functional mobility deficits, address ROM deficits, address strength deficits, analyze and address soft tissue restrictions, analyze and cue movement patterns, analyze and modify body mechanics/ergonomics and assess and modify postural abnormalities to attain remaining goals. [x]  See Plan of Care  []  See progress note/recertification  []  See Discharge Summary         Progress towards goals / Updated goals:    Short Term Goals: To be accomplished in 3 weeks:    1. Patient will be compliant with HEP to progress toward goals and restore functional mobility. Eval Status: initiated at Eval    2. Patient will improve FOTO score by 10 points to improve functional tolerance for exercise. Eval Status: FOTO 31    3. Patient will improve pain in low back to 4/10 at worst to improve ambulation tolerance and restore prior level of function. Eval Status: 8/10 at worst        Long Term Goals: To be accomplished in 6 weeks:    1. Patient will improve FOTO score by 23 points to improve functional tolerance for ambulation.   Eval Status: FOTO 31    2. Patient will have 5/5 bilateral LE strength to return to goals of prolonged ambulation and ADLs. Eval Status:   Hip L (1-5) R (1-5)   Hip Flexion 4/5 4/5   Hip Ext 4-/5 4-/5   Hip ABD 4-/5 4-/5   Hip ADD     Hip ER 4-/5 4-/5   Hip IR 4-/5 4-/5     Knee L (1-5) R (1-5)   Knee Flexion 4/5 4/5   Knee Extension 4/5 4/5   Ankle PF 5/5 5/5   Ankle DF 5/5 5/5   Other         3. Patient will improve pain in low back to 1/10 at worst to improve ambuation tolerance and restore prior level of function. Eval Status: 8/10 at worst      4. Pt will have lumbar ROM WLF without pain to aid in functional mechanics for ambulation and ADLs.   Eval Status:   Trunk Left Right   Flex: 75%*     Ext: 50%     Side bend 50% 50%   Rotation 75% 75%              PLAN  [x]  Upgrade activities as tolerated     [x]  Continue plan of care  []  Update interventions per flow sheet       []  Discharge due to:_  []  Other:_      [id number], PT 7/22/2019  10:12 AM

## 2019-07-22 NOTE — PROGRESS NOTES
[facility]  [address], [address] [place]  Ph [phone]  Fx [phone]    Plan of Care/ Statement of Necessity for Physical Therapy Services    Patient name: Michael Wilson Start of Care: 2019   Referral source: Madison Haque NP : 1970    Medical Diagnosis: low back pain   Onset Date:19    Treatment Diagnosis: Low back pain [M54.5]   Prior Hospitalization: see medical history Provider#: [id number]   Medications: Verified on Patient summary List    Comorbidities: cervical cancer, arthritis, hysterectomy, appendectomy, lumpectomy right breast   Prior Level of Function: functionally independent, no AD      The Plan of Care and following information is based on the information from the initial evaluation. Assessment/ key information: 47 yo female who presents to [facility] with c/o low back pain. Pt reports on  she had significant pain that evening into the next day to the point where she had trouble walking. She reports she had worked the day but she doesn't think that her work duties caused it the pain. Pt reports she has 2/10 pain at best and 8/10 pain at worst. Patient presents with decreased flexibility, decreased strength, decreased lumbar ROM, impaired functional mobility tolerance and pain. Patient has no radicular symptoms but localizes pain to lower lumbar paraspinals and bilateral PSIS. Patient would benefit from skilled PT intervention to address theses deficits. Patient will continue to benefit from skilled PT services to modify and progress therapeutic interventions, address functional mobility deficits, address ROM deficits, address strength deficits, analyze and address soft tissue restrictions, analyze and cue movement patterns, analyze and modify body mechanics/ergonomics and assess and modify postural abnormalities to attain remaining goals.      Evaluation Complexity History MEDIUM  Complexity : 1-2 comorbidities / personal factors will impact the outcome/ POC ; Examination MEDIUM Complexity : 3 Standardized tests and measures addressing body structure, function, activity limitation and / or participation in recreation  ;Presentation MEDIUM Complexity : Evolving with changing characteristics  ; Clinical Decision Making MEDIUM Complexity : FOTO score of 26-74  Overall Complexity Rating: MEDIUM  Problem List: pain affecting function, decrease ROM, decrease strength, edema affecting function, impaired gait/ balance, decrease ADL/ functional abilitiies, decrease activity tolerance and decrease flexibility/ joint mobility   Treatment Plan may include any combination of the following: Therapeutic exercise, Therapeutic activities, Neuromuscular re-education, Physical agent/modality, Gait/balance training, Manual therapy, Patient education, Self Care training and Functional mobility training  Patient / Family readiness to learn indicated by: asking questions, trying to perform skills and interest  Persons(s) to be included in education: patient (P)  Barriers to Learning/Limitations: None  Measures taken if barriers to learning: NA  Patient Goal (s): to be able to bend down and not have it hurt so bad  Patient Self Reported Health Status: fair  Rehabilitation Potential: good    Short Term Goals: To be accomplished in 3 weeks:     1. Patient will be compliant with HEP to progress toward goals and restore functional mobility. Eval Status: initiated at Eval     2. Patient will improve FOTO score by 10 points to improve functional tolerance for exercise. Eval Status: FOTO 31     3. Patient will improve pain in low back to 4/10 at worst to improve ambulation tolerance and restore prior level of function. Eval Status: 8/10 at worst           Long Term Goals: To be accomplished in 6 weeks:     1. Patient will improve FOTO score by 23 points to improve functional tolerance for ambulation. Eval Status: FOTO 31     2.  Patient will have 5/5 bilateral LE strength to return to goals of prolonged ambulation and ADLs. Eval Status:   Hip L (1-5) R (1-5)   Hip Flexion 4/5 4/5   Hip Ext 4-/5 4-/5   Hip ABD 4-/5 4-/5   Hip ADD       Hip ER 4-/5 4-/5   Hip IR 4-/5 4-/5      Knee L (1-5) R (1-5)   Knee Flexion 4/5 4/5   Knee Extension 4/5 4/5   Ankle PF 5/5 5/5   Ankle DF 5/5 5/5   Other             3. Patient will improve pain in low back to 1/10 at worst to improve ambuation tolerance and restore prior level of function. Eval Status: 8/10 at worst        4. Pt will have lumbar ROM WLF without pain to aid in functional mechanics for ambulation and ADLs. Eval Status:   Trunk Left Right   Flex: 75%*       Ext: 50%       Side bend 50% 50%   Rotation 75% 75%                Frequency / Duration: Patient to be seen 2 times per week for 6 weeks. Patient/ Caregiver education and instruction: Diagnosis, prognosis, self care, activity modification and exercises   [x]  Plan of care has been reviewed with PTA    Certification Period: MALINA Lowry, PT 7/22/2019 11:16 AM    ________________________________________________________________________    I certify that the above Therapy Services are being furnished while the patient is under my care. I agree with the treatment plan and certify that this therapy is necessary.     Physician's Signature:_____________________Date:____________TIME:________    [employer], Date and Time must be completed for valid certification **  Please sign and return to [facility] at [facility]  [address], [address] [place]  Ph [phone]  Fx [phone]

## 2019-07-24 ENCOUNTER — HOSPITAL ENCOUNTER (OUTPATIENT)
Dept: PHYSICAL THERAPY | Age: 49
Discharge: HOME | End: 2019-07-24
Payer: OTHER GOVERNMENT

## 2019-07-24 PROCEDURE — 97110 THERAPEUTIC EXERCISES: CPT

## 2019-07-24 NOTE — PROGRESS NOTES
PT DAILY TREATMENT NOTE    Patient Name: Rina Honeycutt  Date:2019  : 1970  [x]  Patient  Verified  Payor: HAKAN / Plan: Anthony Rahman 74 / Product Type: Wendy Guerrier /    In time:1100  Out time:1148  Total Treatment Time (min): 48  Total Timed Codes (min): 38  1:1 Treatment Time ( only): NA   Visit #: 2 of 12    Treatment Area: Low back pain [M54.5]    SUBJECTIVE  Pain Level (0-10 scale): 310  Any medication changes, allergies to medications, adverse drug reactions, diagnosis change, or new procedure performed?: [x] No    [] Yes (see summary sheet for update)  Subjective functional status/changes:   [] No changes reported  Patient pleasant, reports she is sore today.     OBJECTIVE    Modalities Rationale:     decrease pain and increase tissue extensibility to improve patient's ability to restore PLOF                min [] Estim, type/location:                                                            []  att     []  unatt     []  w/US     []  w/ice    []  w/heat     min []  Mechanical Traction: type/lbs                    []  pro   []  sup   []  int   []  cont    []  before manual    []  after manual     min []  Ultrasound, settings/location:        min []  Iontophoresis w/ dexamethasone, location:                                                []  take home patch       []  in clinic   10 min []  Ice     [x]  Heat    location/position: Low back     min []  Vasopneumatic Device, press/temp:       min []  Other:     [x] Skin assessment post-treatment (if applicable):    [x]  intact    []  redness- no adverse reaction     []redness  adverse reaction:        38 min Therapeutic Exercise:  [x] See flow sheet :   Rationale: increase ROM and increase strength to improve the patients ability to restore PLOF      With   [x] TE   [] TA   [] neuro   [] other: Patient Education: [x] Review HEP    [] Progressed/Changed HEP based on:   [] positioning   [] body mechanics   [] transfers   [] heat/ice application    [] other:      Pain Level (0-10 scale) post treatment: 2/10    ASSESSMENT/Changes in Function: Patient tolerated treatment session well today. No complaints with added therex for hip and core strengthening. Patient will continue to benefit from skilled PT services to modify and progress therapeutic interventions, address functional mobility deficits, address ROM deficits, address strength deficits, analyze and address soft tissue restrictions, analyze and cue movement patterns, analyze and modify body mechanics/ergonomics and assess and modify postural abnormalities to attain remaining goals. [x]  See Plan of Care  []  See progress note/recertification  []  See Discharge Summary         Progress towards goals / Updated goals:  Short Term Goals: To be accomplished in 3 weeks:     1. Patient will be compliant with HEP to progress toward goals and restore functional mobility. Eval Status: initiated at [facility]  Current: reports compliance and independence 7/24/19     2. Patient will improve FOTO score by 10 points to improve functional tolerance for exercise. Eval Status: FOTO 31     3. Patient will improve pain in low back to 4/10 at worst to improve ambulation tolerance and restore prior level of function. Eval Status: 8/10 at worst           Long Term Goals: To be accomplished in 6 weeks:     1. Patient will improve FOTO score by 23 points to improve functional tolerance for ambulation. Eval Status: FOTO 31     2. Patient will have 5/5 bilateral LE strength to return to goals of prolonged ambulation and ADLs. Eval Status:   Hip L (1-5) R (1-5)   Hip Flexion 4/5 4/5   Hip Ext 4-/5 4-/5   Hip ABD 4-/5 4-/5   Hip ADD       Hip ER 4-/5 4-/5   Hip IR 4-/5 4-/5      Knee L (1-5) R (1-5)   Knee Flexion 4/5 4/5   Knee Extension 4/5 4/5   Ankle PF 5/5 5/5   Ankle DF 5/5 5/5   Other             3.  Patient will improve pain in low back to 1/10 at worst to improve ambuation tolerance and restore prior level of function. Eval Status: 8/10 at worst        4. Pt will have lumbar ROM WLF without pain to aid in functional mechanics for ambulation and ADLs.   Eval Status:   Trunk Left Right   Flex: 75%*       Ext: 50%       Side bend 50% 50%   Rotation 75% 75%                  PLAN  [x]  Upgrade activities as tolerated     [x]  Continue plan of care  []  Update interventions per flow sheet       []  Discharge due to:_  []  Other:_      Pinky Quintana PT 7/24/2019  11:21 AM    Future Appointments   Date Time Provider Chris Liz   7/30/2019  8:00 AM Wilene Mask, PT [facility]   8/1/2019 11:00 AM Epifanio Lowry, PT [facility]   8/6/2019 10:30 AM Wilene Mask, PT [facility]   8/8/2019  8:00 AM Wilene Mask, PT [facility]   8/26/2019 11:30 AM [facility]   8/28/2019 11:30 AM Epifanio Lowry, PT [facility]   9/3/2019 10:00 AM Wilene Mask, PT [facility]   9/5/2019 10:00 AM Wilene Mask, PT [facility]   9/10/2019 10:00 AM Wilene Mask, PT [facility]   9/12/2019 10:00 AM Wilene Mask, [facility]

## 2019-07-30 ENCOUNTER — HOSPITAL ENCOUNTER (OUTPATIENT)
Dept: PHYSICAL THERAPY | Age: 49
Discharge: HOME | End: 2019-07-30
Payer: OTHER GOVERNMENT

## 2019-07-30 PROCEDURE — 97110 THERAPEUTIC EXERCISES: CPT

## 2019-07-30 NOTE — PROGRESS NOTES
PT DAILY TREATMENT NOTE    Patient Name: Shay Purcell  Date:2019  : 1970  [x]  Patient  Verified  Payor: HAKAN / Plan: Anthony Rahman 74 / Product Type: Sayra Smith /    In time:0800  Out CTAR:6508  Total Treatment Time (min): 48  Total Timed Codes (min): 38  1:1 Treatment Time ( only): NA   Visit #: 3 of 12    Treatment Area: Low back pain [M54.5]    SUBJECTIVE  Pain Level (0-10 scale): 1/10  Any medication changes, allergies to medications, adverse drug reactions, diagnosis change, or new procedure performed?: [x] No    [] Yes (see summary sheet for update)  Subjective functional status/changes:   [] No changes reported  Patient pleasant, reports she was able to negotiate the [place] this weekend without any hip or back pain. She does report LE soreness though.      OBJECTIVE    Modalities Rationale:     decrease pain and increase tissue extensibility to improve patient's ability to restore PLOF   min [] Estim, type/location:                                      []  att     []  unatt     []  w/US     []  w/ice    []  w/heat    min []  Mechanical Traction: type/lbs                   []  pro   []  sup   []  int   []  cont    []  before manual    []  after manual    min []  Ultrasound, settings/location:      min []  Iontophoresis w/ dexamethasone, location:                                               []  take home patch       []  in clinic   10 min []  Ice     [x]  Heat    location/position: Low back    min []  Vasopneumatic Device, press/temp:     min []  Other:    [x] Skin assessment post-treatment (if applicable):    [x]  intact    [x]  redness- no adverse reaction     []redness  adverse reaction:        38 min Therapeutic Exercise:  [] See flow sheet :   Rationale: increase ROM and increase strength to improve the patients ability to restore PLOF and improve ambulation tolerance          With   [x] TE   [] TA   [] neuro   [] other: Patient Education: [x] Review HEP    [] Progressed/Changed HEP based on:   [] positioning   [] body mechanics   [] transfers   [] heat/ice application    [] other:      Pain Level (0-10 scale) post treatment: 0/10    ASSESSMENT/Changes in Function: Patient tolerated treatment session well today. No complaints with added therex for LE and core strengthening. She is making good progress toward goals. Patient will continue to benefit from skilled PT services to modify and progress therapeutic interventions, address functional mobility deficits, address ROM deficits, address strength deficits, analyze and address soft tissue restrictions, analyze and cue movement patterns, analyze and modify body mechanics/ergonomics and assess and modify postural abnormalities to attain remaining goals. [x]  See Plan of Care  []  See progress note/recertification  []  See Discharge Summary         Progress towards goals / Updated goals:  Short Term Goals: To be accomplished in 3 weeks:     1. Patient will be compliant with HEP to progress toward goals and restore functional mobility. Eval Status: initiated at [facility]  Current: reports compliance and independence 7/24/19     2. Patient will improve FOTO score by 10 points to improve functional tolerance for exercise. Eval Status: FOTO 31     3. Patient will improve pain in low back to 4/10 at worst to improve ambulation tolerance and restore prior level of function. Eval Status: 8/10 at worst  Current: 1/10 pain today 7/30/19           Long Term Goals: To be accomplished in 6 weeks:     1. Patient will improve FOTO score by 23 points to improve functional tolerance for ambulation. Eval Status: FOTO 31     2. Patient will have 5/5 bilateral LE strength to return to goals of prolonged ambulation and ADLs.   Eval Status:   Hip L (1-5) R (1-5)   Hip Flexion 4/5 4/5   Hip Ext 4-/5 4-/5   Hip ABD 4-/5 4-/5   Hip ADD       Hip ER 4-/5 4-/5   Hip IR 4-/5 4-/5      Knee L (1-5) R (1-5)   Knee Flexion 4/5 4/5   Knee Extension 4/5 4/5   Ankle PF 5/5 5/5   Ankle DF 5/5 5/5   Other             3. Patient will improve pain in low back to 1/10 at worst to improve ambuation tolerance and restore prior level of function. Eval Status: 8/10 at worst        4. Pt will have lumbar ROM WLF without pain to aid in functional mechanics for ambulation and ADLs.   Eval Status:   Trunk Left Right   Flex: 75%*       Ext: 50%       Side bend 50% 50%   Rotation 75% 75%                  PLAN  [x]  Upgrade activities as tolerated     [x]  Continue plan of care  []  Update interventions per flow sheet       []  Discharge due to:_  []  Other:_      Yomi Real, PT 7/30/2019  8:00 AM    Future Appointments   Date Time Provider Chris Liz   8/1/2019 11:00 AM Laurie Lowry [facility]   8/6/2019 10:30 AM Kennedy Gutiérrez [facility]   8/8/2019  8:00 AM Laurie Lowry [facility]   8/26/2019 11:30 AM Laurie Lowry [facility]   8/28/2019 11:30 AM Laurie Lowry, [facility]   9/3/2019 10:00 AM Kennedy Gutiérrez [facility]   9/5/2019 10:00 AM Kennedy Gutiérrez [facility]   9/10/2019 10:00 AM Kennedy Gutiérrez [facility]   9/12/2019 10:00 AM Kennedy Gutiérrez [facility]

## 2019-08-01 ENCOUNTER — HOSPITAL ENCOUNTER (OUTPATIENT)
Dept: PHYSICAL THERAPY | Age: 49
Discharge: HOME | End: 2019-08-01
Payer: OTHER GOVERNMENT

## 2019-08-01 PROCEDURE — 97110 THERAPEUTIC EXERCISES: CPT

## 2019-08-01 NOTE — PROGRESS NOTES
PT DAILY TREATMENT NOTE    Patient Name: Oscar Landin  Date:2019  : 1970  [x]  Patient  Verified  Payor:  / Plan: Anthony Rahman 74 / Product Type:  /    In time: 1055  Out time:1150  Total Treatment Time (min): 55  Total Timed Codes (min): 45  1:1 Treatment Time ( [address] only): 39   Visit #: 4 of 12    Treatment Area: Low back pain [M54.5]    SUBJECTIVE  Pain Level (0-10 scale): 1/10  Any medication changes, allergies to medications, adverse drug reactions, diagnosis change, or new procedure performed?: [x] No    [] Yes (see summary sheet for update)  Subjective functional status/changes:   [] No changes reported  Patient pleasant, reports no changes.      OBJECTIVE    Modalities Rationale:     decrease pain and increase tissue extensibility to improve patient's ability to restore PLOF   min [] Estim, type/location:                                      []  att     []  unatt     []  w/US     []  w/ice    []  w/heat    min []  Mechanical Traction: type/lbs                   []  pro   []  sup   []  int   []  cont    []  before manual    []  after manual    min []  Ultrasound, settings/location:      min []  Iontophoresis w/ dexamethasone, location:                                               []  take home patch       []  in clinic   10 min []  Ice     [x]  Heat    location/position: Prone, low back    min []  Vasopneumatic Device, press/temp:     min []  Other:    [x] Skin assessment post-treatment (if applicable):    [x]  intact    [x]  redness- no adverse reaction     []redness  adverse reaction:        45 min Therapeutic Exercise:  [x] See flow sheet :   Rationale: increase ROM, increase strength and improve balance to improve the patients ability to restore PLOF      With   [x] TE   [] TA   [] neuro   [] other: Patient Education: [x] Review HEP    [] Progressed/Changed HEP based on:   [] positioning   [] body mechanics   [] transfers   [] heat/ice application    [] other: Pain Level (0-10 scale) post treatment: 0/10    ASSESSMENT/Changes in Function: Patient tolerated treatment session well today. No complaints with added therex for core and LE strengthening. Patient will continue to benefit from skilled PT services to modify and progress therapeutic interventions, address functional mobility deficits, address ROM deficits, address strength deficits, analyze and address soft tissue restrictions, analyze and cue movement patterns, analyze and modify body mechanics/ergonomics and assess and modify postural abnormalities to attain remaining goals. [x]  See Plan of Care  []  See progress note/recertification  []  See Discharge Summary         Progress towards goals / Updated goals:    Short Term Goals: To be accomplished in 3 weeks:     1. Patient will be compliant with HEP to progress toward goals and restore functional mobility. Eval Status: initiated at [facility]  Current: reports compliance and independence 7/24/19     2. Patient will improve FOTO score by 10 points to improve functional tolerance for exercise. Eval Status: FOTO 31       3. Patient will improve pain in low back to 4/10 at worst to improve ambulation tolerance and restore prior level of function. Eval Status: 8/10 at worst  Current: 1/10 pain today 7/30/19           Long Term Goals: To be accomplished in 6 weeks:     1. Patient will improve FOTO score by 23 points to improve functional tolerance for ambulation. Eval Status: FOTO 31     2. Patient will have 5/5 bilateral LE strength to return to goals of prolonged ambulation and ADLs. Eval Status:   Hip L (1-5) R (1-5)   Hip Flexion 4/5 4/5   Hip Ext 4-/5 4-/5   Hip ABD 4-/5 4-/5   Hip ADD       Hip ER 4-/5 4-/5   Hip IR 4-/5 4-/5      Knee L (1-5) R (1-5)   Knee Flexion 4/5 4/5   Knee Extension 4/5 4/5   Ankle PF 5/5 5/5   Ankle DF 5/5 5/5   Other             3.  Patient will improve pain in low back to 1/10 at worst to improve ambuation tolerance and restore prior level of function. Eval Status: 8/10 at worst        4. Pt will have lumbar ROM WLF without pain to aid in functional mechanics for ambulation and ADLs.   Eval Status:   Trunk Left Right   Flex: 75%*       Ext: 50%       Side bend 50% 50%   Rotation 75% 75%                  PLAN  [x]  Upgrade activities as tolerated     [x]  Continue plan of care  []  Update interventions per flow sheet       []  Discharge due to:_  []  Other:_      [facility],  8/1/2019  12:02 PM    Future Appointments   Date Time Provider Chris Liz   8/6/2019 10:30 AM Nati Zazueta PT [facility]   8/8/2019  8:00 AM Chelle Lowry, PT [facility]   8/26/2019 11:30 AM Chelle Lowry, PT [facility]   8/28/2019 11:30 AM Chelle Lowry, PT [facility]   9/3/2019 10:00 AM Nati Zazueta PT [facility]   9/5/2019 10:00 AM Nati Zazueta PT [facility]   9/10/2019 10:00 AM Nati Zazueta PT [facility]   9/12/2019 10:00 AM Nati Zazueta PT [facility]

## 2019-08-06 ENCOUNTER — HOSPITAL ENCOUNTER (OUTPATIENT)
Dept: PHYSICAL THERAPY | Age: 49
Discharge: HOME | End: 2019-08-06
Payer: OTHER GOVERNMENT

## 2019-08-06 PROCEDURE — 97110 THERAPEUTIC EXERCISES: CPT

## 2019-08-06 NOTE — PROGRESS NOTES
PT DAILY TREATMENT NOTE    Patient Name: Krish Romeo  Date:2019  : 1970  [x]  Patient  Verified  Payor: HAKAN / Plan: Anthony Rahman 74 / Product Type:  /    In time:1104  Out time:1200  Total Treatment Time (min): 56  Total Timed Codes (min): 56  1:1 Treatment Time ( only): 64   Visit #: 5 of 12    Treatment Area: Low back pain [M54.5]    SUBJECTIVE  Pain Level (0-10 scale): 5/10 hip pain  Any medication changes, allergies to medications, adverse drug reactions, diagnosis change, or new procedure performed?: [x] No    [] Yes (see summary sheet for update)  Subjective functional status/changes:   [] No changes reported  Patient pleasant, reports she has 0/10 pain in low back but her hip is bothering her today    OBJECTIVE    56 min Therapeutic Exercise:  [] See flow sheet :   Rationale: increase ROM, increase strength, improve balance and increase proprioception to improve the patients ability to restore PLOF. With   [x] TE   [] TA   [] neuro   [] other: Patient Education: [x] Review HEP    [] Progressed/Changed HEP based on:   [] positioning   [] body mechanics   [] transfers   [] heat/ice application    [] other:        Pain Level (0-10 scale) post treatment: 0/10 LBP        3/10 hip pain  ASSESSMENT/Changes in Function: Patient tolerated treatment session well today. No complaints with added x-walks. Pt is making good progress toward goals. Patient will continue to benefit from skilled PT services to modify and progress therapeutic interventions, address functional mobility deficits, address ROM deficits, address strength deficits, analyze and address soft tissue restrictions, analyze and cue movement patterns, analyze and modify body mechanics/ergonomics and assess and modify postural abnormalities to attain remaining goals.      [x]  See Plan of Care  []  See progress note/recertification  []  See Discharge Summary         Progress towards goals / Updated goals:  Short Term Goals: To be accomplished in 3 weeks:     1. Patient will be compliant with HEP to progress toward goals and restore functional mobility. Eval Status: initiated at Eval  Current: reports compliance and independence 7/24/19     2. Patient will improve FOTO score by 10 points to improve functional tolerance for exercise. Eval Status: FOTO 31        3. Patient will improve pain in low back to 4/10 at worst to improve ambulation tolerance and restore prior level of function. Eval Status: 8/10 at worst  Current: 1/10 pain today 7/30/19           Long Term Goals: To be accomplished in 6 weeks:     1. Patient will improve FOTO score by 23 points to improve functional tolerance for ambulation. Eval Status: FOTO 31     2. Patient will have 5/5 bilateral LE strength to return to goals of prolonged ambulation and ADLs. Eval Status:   Hip L (1-5) R (1-5)   Hip Flexion 4/5 4/5   Hip Ext 4-/5 4-/5   Hip ABD 4-/5 4-/5   Hip ADD       Hip ER 4-/5 4-/5   Hip IR 4-/5 4-/5      Knee L (1-5) R (1-5)   Knee Flexion 4/5 4/5   Knee Extension 4/5 4/5   Ankle PF 5/5 5/5   Ankle DF 5/5 5/5   Other             3. Patient will improve pain in low back to 1/10 at worst to improve ambuation tolerance and restore prior level of function. Eval Status: 8/10 at worst  Current: 0/10 LBP pain today, 5/10 hip pain 8/6/19     4. Pt will have lumbar ROM WLF without pain to aid in functional mechanics for ambulation and ADLs.   Eval Status:   Trunk Left Right   Flex: 75%*       Ext: 50%       Side bend 50% 50%   Rotation 75% 75%                  PLAN  [x]  Upgrade activities as tolerated     [x]  Continue plan of care  []  Update interventions per flow sheet       []  Discharge due to:_  []  Other:_      Bhavesh Cano, PT 8/6/2019  4:30 PM    Future Appointments   Date Time Provider Chris Liz   8/26/2019 11:00 AM Saulo Medel, PT [facility]

## 2019-08-08 ENCOUNTER — APPOINTMENT (OUTPATIENT)
Dept: PHYSICAL THERAPY | Age: 49
End: 2019-08-08
Payer: OTHER GOVERNMENT

## 2019-08-23 NOTE — PROGRESS NOTES
[facility]  [address] [place] Haque, [address] [facility]  Ph [phone]  Fx [phone]    Physical Therapy Discharge Summary    Patient name: Rosalva Dunn Start of Care: 19   Referral source: Diann Mcknight EFRAIN : 1970   Medical/Treatment Diagnosis: Low back pain [M54.5] Onset Date:19     Prior Hospitalization: see medical history Provider#: [id number]   Medications: Verified on Patient Summary List    Comorbidities: cervical ca, arthritis, hysterectomy, appendectomy, lumpectomy right breast  Prior Level of Function:functionally independent, no AD    Visits from Start of Care: 5    Missed Visits: 0    Reporting Period : 19 to 19    Summary of Care:    Unable to formally assess goals as pt failed to show for scheduled followups. Patient to be DC to HEP at this time and will require new order for further therapy followup. Short Term Goals: To be accomplished in 3 weeks:     1. Patient will be compliant with HEP to progress toward goals and restore functional mobility. Eval Status: initiated at [facility]  Current: reports compliance and independence 19     2. Patient will improve FOTO score by 10 points to improve functional tolerance for exercise. Eval Status: FOTO 31        3. Patient will improve pain in low back to 4/10 at worst to improve ambulation tolerance and restore prior level of function. Eval Status: 8/10 at worst  Current: 1/10 pain today 19           Long Term Goals: To be accomplished in 6 weeks:     1. Patient will improve FOTO score by 23 points to improve functional tolerance for ambulation. Eval Status: FOTO 31     2. Patient will have 5/5 bilateral LE strength to return to goals of prolonged ambulation and ADLs.   Eval Status:   Hip L (1-5) R (1-5)   Hip Flexion 4/5 4/5   Hip Ext 4-/5 4-/5   Hip ABD 4-/5 4-/5   Hip ADD       Hip ER 4-/5 4-/5   Hip IR 4-/5 4-/5      Knee L (1-5) R (1-5)   Knee Flexion 4/5 4/5   Knee Extension 4/5 4/5 Ankle PF 5/5 5/5   Ankle DF 5/5 5/5   Other             3. Patient will improve pain in low back to 1/10 at worst to improve ambuation tolerance and restore prior level of function. Eval Status: 8/10 at worst  Current: 0/10 LBP pain today, 5/10 hip pain 8/6/19     4. Pt will have lumbar ROM WLF without pain to aid in functional mechanics for ambulation and ADLs.   Eval Status:   Trunk Left Right   Flex: 75%*       Ext: 50%       Side bend 50% 50%   Rotation 75% 75%               ASSESSMENT/RECOMMENDATIONS:  [x]Discontinue therapy: []Patient has reached or is progressing toward set goals      [x]Patient is non-compliant or has abdicated      []Due to lack of appreciable progress towards set goals    Poornima Lowry, PT 8/23/2019 1:04 PM

## 2019-08-23 NOTE — PROGRESS NOTES
[facility]  [address] [place]denas, [address] [facility]  Ph [phone]  Fx [phone]    Physical Therapy Discharge Summary    Patient name: Rico Grant Start of Care: 19   Referral source: Checo Song* : 1970   Medical/Treatment Diagnosis: Right hip pain [M25.551] Onset Date:chronic     Prior Hospitalization: see medical history Provider#: [id number]   Medications: Verified on Patient Summary List    Comorbidities: arthritis  Prior Level of Function:independent, no AD    Visits from Start of Care: 14    Missed Visits: 2    Reporting Period : 19 to 19    Summary of Care:    Pt has made good progress toward all goals. She is independent with HEP and is ready for DC to HEP at this time. She will require new order for further therapy services. Short Term Goals: To be accomplished in 3 weeks:  1. Patient will be compliant with HEP to progress toward goals and restore functional mobility. Eval Status: Issued at [facility] [place] daily 19     2. Patient will improve FOTO score by 8 points to improve functional tolerance for exercise. Eval Status: FOTO 42   Current: FOTO 45-progressing 19     3. Patient will improve pain in right hip to 3/10 at worst to improve ambulation tolerance and restore prior level of function. Eval Status: 6/10 at worst  Current: 1-2/10 at [address] [place] [address] 21 be accomplished in 6 weeks:  1. 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 Eval Status: issued at [facility]  Current: reports compliance with new exercises added to HEP at last visit 19     2. Patient will improve FOTO score by 15 points to improve functional tolerance for return to previous level of exercise. Eval Status: FOTO 42    Current: FOTO 45-progressing 19     3.  Patient will have 5/5 bilateral LE strength to return to goals of return to previous level of exercise.   Eval Status:   Hip L (1-5) R (1-5)   Hip Flexion 4+/5 4-/5   Hip Ext 4/5 4-/5*   Hip ABD 4/5 4-/5*   Hip ADD 4/5 4-/5   Hip ER       Hip IR          Knee L (1-5) R (1-5)   Knee Flexion 4+/5 4/5   Knee Extension 4+/5 4/5   Ankle PF       Ankle DF       Other          Current:  Hip L (1-5) R (1-5)   Hip Flexion 5/5 5/5   Hip Ext 5/5 5/5   Hip ABD 4+/5 4+/5   Hip ADD 4+/5 4+/5   Hip ER       Hip IR          Knee L (1-5) R (1-5)   Knee Flexion 5/5 5/5   Knee Extension 5/5 5/5   Ankle PF       Ankle DF       Other       Progressing 7/16/19    ASSESSMENT/RECOMMENDATIONS:  [x]Discontinue therapy: [x]Patient has reached or is progressing toward set goals      []Patient is non-compliant or has abdicated      []Due to lack of appreciable progress towards set goals    Poornima Lowry, PT 8/23/2019 12:57 PM

## 2019-08-26 ENCOUNTER — APPOINTMENT (OUTPATIENT)
Dept: PHYSICAL THERAPY | Age: 49
End: 2019-08-26
Payer: OTHER GOVERNMENT

## 2019-08-28 ENCOUNTER — APPOINTMENT (OUTPATIENT)
Dept: PHYSICAL THERAPY | Age: 49
End: 2019-08-28
Payer: OTHER GOVERNMENT

## 2019-09-03 ENCOUNTER — APPOINTMENT (OUTPATIENT)
Dept: PHYSICAL THERAPY | Age: 49
End: 2019-09-03

## 2019-09-05 ENCOUNTER — APPOINTMENT (OUTPATIENT)
Dept: PHYSICAL THERAPY | Age: 49
End: 2019-09-05

## 2019-09-10 ENCOUNTER — APPOINTMENT (OUTPATIENT)
Dept: PHYSICAL THERAPY | Age: 49
End: 2019-09-10

## 2019-09-12 ENCOUNTER — APPOINTMENT (OUTPATIENT)
Dept: PHYSICAL THERAPY | Age: 49
End: 2019-09-12

## 2022-02-03 NOTE — ED PROVIDER NOTES
EMERGENCY DEPARTMENT HISTORY AND PHYSICAL EXAM    Date: 7/4/2018  Patient Name: Juvenal Ponce    History of Presenting Illness     Chief Complaint   Patient presents with    Ankle Pain         History Provided By: Patient    Chief Complaint: ankle pain  Duration: 3 Hours  Timing:  Acute  Location: left ankle   Severity: 8 out of 10  Modifying Factors: No modifying factors   Associated Symptoms: left 5th toe pain    Additional History (Context):   12:02 AM   Juvenal Ponce is a 50 y.o. female with no pertinent PMHx presents to the emergency department C/O left foot pain onset 3 hours. Associated sxs include left 5th toe pain. Pt states that she was running down the stairs when she missed two steps and fell down. Pt has been having toe and ankle pain since. Pt took ibuprofen and applied ice, pt indicates that pain is significantly reduced in the ED. Pt denies any other sxs or complaints. PCP: Sandra Greene MD    Current Outpatient Prescriptions   Medication Sig Dispense Refill    ibuprofen (MOTRIN) 600 mg tablet Take 1 Tab by mouth every six (6) hours as needed for Pain. 20 Tab 0    naproxen (NAPROSYN) 500 mg tablet Take 500 mg by mouth two (2) times daily (with meals).  EPINEPHrine (EPIPEN) 0.3 mg/0.3 mL (1:1,000) injection 0.3 mg by IntraMUSCular route once as needed.  pseudoephedrine (SUDAFED) 60 mg tablet Take 60 mg by mouth every four (4) hours as needed for Congestion.  OTHER Unknown muscle relaxer      carisoprodol (SOMA) 350 mg tablet Take 1 Tab by mouth four (4) times daily. 20 Tab 0    traMADol (ULTRAM) 50 mg tablet Take 1 Tab by mouth every six (6) hours as needed for Pain.  20 Tab 0       Past History     Past Medical History:  Past Medical History:   Diagnosis Date    Seizures ([address]        Past Surgical History:  Past Surgical History:   Procedure Laterality Date    BREAST SURGERY PROCEDURE UNLISTED      lump removed    HX APPENDECTOMY      HX HYSTERECTOMY      HX Visit in 6months    Due for fasting labs we talked about - can make lab only appointment    TUBAL LIGATION         Family History:  History reviewed. No pertinent family history. Social History:  Social History   Substance Use Topics    Smoking status: Former Smoker    Smokeless tobacco: None    Alcohol use Yes      Comment: rare       Allergies: Allergies   Allergen Reactions    Temple Rash         Review of Systems   Review of Systems   Constitutional: Negative. Musculoskeletal: Positive for arthralgias. Skin: Positive for color change. Negative for wound. All other systems reviewed and are negative. Physical Exam     Vitals:    07/04/18 2351   BP: 133/80   Pulse: 71   Resp: 12   Temp: 98 °F (36.7 °C)   SpO2: 99%   Weight: 83 kg (183 lb)   Height: 5' 8\" (1.727 m)     Physical Exam   Constitutional: She is oriented to person, place, and time. She appears well-developed and well-nourished. HENT:   Head: Normocephalic. Eyes: Conjunctivae are normal.   Neck: Normal range of motion. No TTP or step off noted   Musculoskeletal:        Feet:    Neurological: She is alert and oriented to person, place, and time. Skin: Skin is warm and dry. Nursing note and vitals reviewed. Diagnostic Study Results     Labs -   No results found for this or any previous visit (from the past 12 hour(s)). Radiologic Studies -   XR ANKLE LT MIN 3 V    (Results Pending)   XR FOOT LT MIN 3 V    (Results Pending)     CT Results  (Last 48 hours)    None        CXR Results  (Last 48 hours)    None        12:43 AM  RADIOLOGY FINDINGS  Left ankle X-ray shows no acute process  Pending review by Radiologist  Recorded by KELLEE Mcconnellibedwina, as dictated by Thang SHIPMAN-BC     12:43 AM  RADIOLOGY FINDINGS  Left foot X-ray shows mildly displaced fx of the 5th toe  Pending review by Radiologist  Recorded by KELLEE Mcconnellibedwina, as dictated by Thang SHIPMAN-BC     Medical Decision Making   I am the first provider for this patient.     I reviewed the vital signs, available nursing notes, past medical history, past surgical history, family history and social history. Vital Signs-Reviewed the patient's vital signs. Pulse Oximetry Analysis - 99% on RA     Cardiac Monitor:  Rate: 71 bpm  Rhythm: NSR    Records Reviewed: Nursing Notes    Provider Notes (Medical Decision Making): Patient with mechanical fall, left foot, ankle and little toe pain. She took Motrin PTA, Xray's of foot and ankle are unremarkable except for possible deviation and fracture of little toe. Patient given Crutches, toe taped, post op shoe and ankle Aircast. She will use RICE protocol and follow up with her PCP. Radiologist will read xrays in the am. She understands reasons to return and is offering no questions or concerns at this time. Procedures:  Procedures    ED Course:   12:02 AM Initial assessment performed. The patients presenting problems have been discussed, and they are in agreement with the care plan formulated and outlined with them. I have encouraged them to ask questions as they arise throughout their visit. 12:58 AM Pt updated on all results. [place] She will use RICE protocol. She is offering no questions or concerns. Diagnosis and Disposition       DISCHARGE NOTE:  1:07 AM   Daniel Recinos  results have been reviewed with her. She has been counseled regarding her diagnosis, treatment, and plan. She verbally conveys understanding and agreement of the signs, symptoms, diagnosis, treatment and prognosis and additionally agrees to follow up as discussed. She also agrees with the care-plan and conveys that all of her questions have been answered. I have also provided discharge instructions for her that include: educational information regarding their diagnosis and treatment, and list of reasons why they would want to return to the ED prior to their follow-up appointment, should her condition change.  She has been provided with education for proper emergency department utilization. CLINICAL IMPRESSION:    1. Fall, initial encounter    2. Sprain of left ankle, unspecified ligament, initial encounter    3. Sprain of left foot, initial encounter    4. Closed displaced fracture of distal phalanx of lesser toe of left foot, initial encounter        Discussion:    PLAN:  1. D/C Home  2. Current Discharge Medication List      START taking these medications    Details   ibuprofen (MOTRIN) 600 mg tablet Take 1 Tab by mouth every six (6) hours as needed for Pain. Qty: 20 Tab, Refills: 0           3. Follow-up Information     Follow up With Details Comments Contact Info    HAKAN Schedule an appointment as soon as possible for a visit For primary care follow up [phone]    [facility] EMERGENCY DEPT  As needed, if symptoms worsen [address]  [address] [facility] [address]  [phone]        _______________________________    Attestations: This note is prepared by Merna Orona, acting as Scribe for Delaney Anderson [facility]. Delaney Anderson [facility]:  The scribe's documentation has been prepared under my direction and personally reviewed by me in its entirety.   I confirm that the note above accurately reflects all work, treatment, procedures, and medical decision making performed by me.  _______________________________